# Patient Record
Sex: MALE | Race: WHITE | Employment: OTHER | URBAN - NONMETROPOLITAN AREA
[De-identification: names, ages, dates, MRNs, and addresses within clinical notes are randomized per-mention and may not be internally consistent; named-entity substitution may affect disease eponyms.]

---

## 2017-02-10 DIAGNOSIS — K21.9 GASTROESOPHAGEAL REFLUX DISEASE WITHOUT ESOPHAGITIS: Primary | ICD-10-CM

## 2017-02-13 RX ORDER — PANTOPRAZOLE SODIUM 40 MG/1
TABLET, DELAYED RELEASE ORAL
Qty: 180 TABLET | Refills: 1 | Status: SHIPPED | OUTPATIENT
Start: 2017-02-13 | End: 2018-10-23

## 2017-03-29 DIAGNOSIS — D64.9 ANEMIA: ICD-10-CM

## 2017-03-29 DIAGNOSIS — R52 PAIN: ICD-10-CM

## 2017-03-31 RX ORDER — TRAMADOL HYDROCHLORIDE 50 MG/1
TABLET ORAL
Qty: 30 TABLET | Refills: 0 | Status: SHIPPED | OUTPATIENT
Start: 2017-03-31 | End: 2018-07-30

## 2017-03-31 RX ORDER — NEEDLES, DISPOSABLE 25GX5/8"
NEEDLE, DISPOSABLE MISCELLANEOUS
Qty: 2 EACH | Refills: 1 | Status: SHIPPED | OUTPATIENT
Start: 2017-03-31

## 2017-04-21 ENCOUNTER — AMBULATORY - GICH (OUTPATIENT)
Dept: FAMILY MEDICINE | Facility: OTHER | Age: 67
End: 2017-04-21

## 2017-04-26 ENCOUNTER — HISTORY (OUTPATIENT)
Dept: FAMILY MEDICINE | Facility: OTHER | Age: 67
End: 2017-04-26

## 2017-04-26 ENCOUNTER — OFFICE VISIT - GICH (OUTPATIENT)
Dept: FAMILY MEDICINE | Facility: OTHER | Age: 67
End: 2017-04-26

## 2017-04-26 DIAGNOSIS — K50.919 CROHN'S DISEASE WITH COMPLICATION (H): ICD-10-CM

## 2017-04-26 DIAGNOSIS — M19.90 OSTEOARTHRITIS: ICD-10-CM

## 2017-04-26 DIAGNOSIS — D51.0 VITAMIN B12 DEFICIENCY ANEMIA DUE TO INTRINSIC FACTOR DEFICIENCY: ICD-10-CM

## 2017-04-26 DIAGNOSIS — E78.5 HYPERLIPIDEMIA: ICD-10-CM

## 2017-05-02 ENCOUNTER — COMMUNICATION - GICH (OUTPATIENT)
Dept: FAMILY MEDICINE | Facility: OTHER | Age: 67
End: 2017-05-02

## 2017-05-02 DIAGNOSIS — E78.5 HYPERLIPIDEMIA: ICD-10-CM

## 2017-05-23 ENCOUNTER — COMMUNICATION - GICH (OUTPATIENT)
Dept: FAMILY MEDICINE | Facility: OTHER | Age: 67
End: 2017-05-23

## 2017-05-23 DIAGNOSIS — K21.9 GASTRO-ESOPHAGEAL REFLUX DISEASE WITHOUT ESOPHAGITIS: ICD-10-CM

## 2017-08-03 ENCOUNTER — AMBULATORY - GICH (OUTPATIENT)
Dept: SCHEDULING | Facility: OTHER | Age: 67
End: 2017-08-03

## 2017-08-16 ENCOUNTER — COMMUNICATION - GICH (OUTPATIENT)
Dept: FAMILY MEDICINE | Facility: OTHER | Age: 67
End: 2017-08-16

## 2017-08-16 DIAGNOSIS — K21.00 GASTRO-ESOPHAGEAL REFLUX DISEASE WITH ESOPHAGITIS: ICD-10-CM

## 2017-08-16 DIAGNOSIS — K22.70 BARRETT'S ESOPHAGUS WITHOUT DYSPLASIA: ICD-10-CM

## 2017-08-16 DIAGNOSIS — E78.5 HYPERLIPIDEMIA: ICD-10-CM

## 2017-08-16 DIAGNOSIS — Z00.00 ENCOUNTER FOR GENERAL ADULT MEDICAL EXAMINATION WITHOUT ABNORMAL FINDINGS: ICD-10-CM

## 2017-08-17 ENCOUNTER — AMBULATORY - GICH (OUTPATIENT)
Dept: LAB | Facility: OTHER | Age: 67
End: 2017-08-17

## 2017-08-17 ENCOUNTER — AMBULATORY - GICH (OUTPATIENT)
Dept: SCHEDULING | Facility: OTHER | Age: 67
End: 2017-08-17

## 2017-08-17 DIAGNOSIS — E78.5 HYPERLIPIDEMIA: ICD-10-CM

## 2017-08-17 DIAGNOSIS — Z00.00 ENCOUNTER FOR GENERAL ADULT MEDICAL EXAMINATION WITHOUT ABNORMAL FINDINGS: ICD-10-CM

## 2017-08-17 LAB
A/G RATIO - HISTORICAL: 1.5 (ref 1–2)
ALBUMIN SERPL-MCNC: 3.8 G/DL (ref 3.5–5.7)
ALP SERPL-CCNC: 61 IU/L (ref 34–104)
ALT (SGPT) - HISTORICAL: 18 IU/L (ref 7–52)
ANION GAP - HISTORICAL: 12 (ref 5–18)
AST SERPL-CCNC: 21 IU/L (ref 13–39)
BILIRUB SERPL-MCNC: 0.6 MG/DL (ref 0.3–1)
BUN SERPL-MCNC: 12 MG/DL (ref 7–25)
BUN/CREAT RATIO - HISTORICAL: 13
CALCIUM SERPL-MCNC: 8.8 MG/DL (ref 8.6–10.3)
CHLORIDE SERPLBLD-SCNC: 107 MMOL/L (ref 98–107)
CHOL/HDL RATIO - HISTORICAL: 4.18
CHOLESTEROL TOTAL: 205 MG/DL
CO2 SERPL-SCNC: 23 MMOL/L (ref 21–31)
CREAT SERPL-MCNC: 0.94 MG/DL (ref 0.7–1.3)
GFR IF NOT AFRICAN AMERICAN - HISTORICAL: >60 ML/MIN/1.73M2
GLOBULIN - HISTORICAL: 2.5 G/DL (ref 2–3.7)
GLUCOSE SERPL-MCNC: 83 MG/DL (ref 70–105)
HDLC SERPL-MCNC: 49 MG/DL (ref 23–92)
LDLC SERPL CALC-MCNC: 139 MG/DL
NON-HDL CHOLESTEROL - HISTORICAL: 156 MG/DL
PATIENT STATUS - HISTORICAL: ABNORMAL
POTASSIUM SERPL-SCNC: 4.4 MMOL/L (ref 3.5–5.1)
PROT SERPL-MCNC: 6.3 G/DL (ref 6.4–8.9)
SODIUM SERPL-SCNC: 142 MMOL/L (ref 133–143)
TRIGL SERPL-MCNC: 85 MG/DL

## 2017-08-18 ENCOUNTER — AMBULATORY - GICH (OUTPATIENT)
Dept: SCHEDULING | Facility: OTHER | Age: 67
End: 2017-08-18

## 2017-08-21 ENCOUNTER — AMBULATORY - GICH (OUTPATIENT)
Dept: SCHEDULING | Facility: OTHER | Age: 67
End: 2017-08-21

## 2017-08-28 ENCOUNTER — AMBULATORY - GICH (OUTPATIENT)
Dept: SCHEDULING | Facility: OTHER | Age: 67
End: 2017-08-28

## 2017-08-29 ENCOUNTER — OFFICE VISIT - GICH (OUTPATIENT)
Dept: FAMILY MEDICINE | Facility: OTHER | Age: 67
End: 2017-08-29

## 2017-08-29 ENCOUNTER — AMBULATORY - GICH (OUTPATIENT)
Dept: SCHEDULING | Facility: OTHER | Age: 67
End: 2017-08-29

## 2017-08-29 ENCOUNTER — HISTORY (OUTPATIENT)
Dept: FAMILY MEDICINE | Facility: OTHER | Age: 67
End: 2017-08-29

## 2017-08-29 DIAGNOSIS — Z91.89 OTHER SPECIFIED PERSONAL RISK FACTORS, NOT ELSEWHERE CLASSIFIED: ICD-10-CM

## 2017-09-15 ENCOUNTER — AMBULATORY - GICH (OUTPATIENT)
Dept: SCHEDULING | Facility: OTHER | Age: 67
End: 2017-09-15

## 2017-09-28 ENCOUNTER — AMBULATORY - GICH (OUTPATIENT)
Dept: SCHEDULING | Facility: OTHER | Age: 67
End: 2017-09-28

## 2017-11-20 ENCOUNTER — HISTORY (OUTPATIENT)
Dept: FAMILY MEDICINE | Facility: OTHER | Age: 67
End: 2017-11-20

## 2017-11-22 ENCOUNTER — HISTORY (OUTPATIENT)
Dept: FAMILY MEDICINE | Facility: OTHER | Age: 67
End: 2017-11-22

## 2017-11-22 ENCOUNTER — OFFICE VISIT - GICH (OUTPATIENT)
Dept: FAMILY MEDICINE | Facility: OTHER | Age: 67
End: 2017-11-22

## 2017-11-22 DIAGNOSIS — D51.0 VITAMIN B12 DEFICIENCY ANEMIA DUE TO INTRINSIC FACTOR DEFICIENCY: ICD-10-CM

## 2017-11-22 DIAGNOSIS — Z23 ENCOUNTER FOR IMMUNIZATION: ICD-10-CM

## 2017-11-22 DIAGNOSIS — E78.5 HYPERLIPIDEMIA: ICD-10-CM

## 2017-11-22 DIAGNOSIS — K50.919 CROHN'S DISEASE WITH COMPLICATION (H): ICD-10-CM

## 2017-11-22 DIAGNOSIS — Z01.810 ENCOUNTER FOR PREPROCEDURAL CARDIOVASCULAR EXAMINATION: ICD-10-CM

## 2017-11-22 LAB
ABSOLUTE BASOPHILS - HISTORICAL: 0 THOU/CU MM
ABSOLUTE EOSINOPHILS - HISTORICAL: 0.1 THOU/CU MM
ABSOLUTE IMMATURE GRANULOCYTES(METAS,MYELOS,PROS) - HISTORICAL: 0 THOU/CU MM
ABSOLUTE LYMPHOCYTES - HISTORICAL: 0.5 THOU/CU MM (ref 0.9–2.9)
ABSOLUTE MONOCYTES - HISTORICAL: 0.5 THOU/CU MM
ABSOLUTE NEUTROPHILS - HISTORICAL: 3.6 THOU/CU MM (ref 1.7–7)
BASOPHILS # BLD AUTO: 0.8 %
CHOL/HDL RATIO - HISTORICAL: 3.02
CHOLESTEROL TOTAL: 157 MG/DL
EOSINOPHIL NFR BLD AUTO: 2.5 %
ERYTHROCYTE [DISTWIDTH] IN BLOOD BY AUTOMATED COUNT: 16.7 % (ref 11.5–15.5)
HCT VFR BLD AUTO: 37.2 % (ref 37–53)
HDLC SERPL-MCNC: 52 MG/DL (ref 23–92)
HEMOGLOBIN: 11.5 G/DL (ref 13.5–17.5)
IMMATURE GRANULOCYTES(METAS,MYELOS,PROS) - HISTORICAL: 0.4 %
LDLC SERPL CALC-MCNC: 91 MG/DL
LYMPHOCYTES NFR BLD AUTO: 9.7 % (ref 20–44)
MCH RBC QN AUTO: 26.1 PG (ref 26–34)
MCHC RBC AUTO-ENTMCNC: 30.9 G/DL (ref 32–36)
MCV RBC AUTO: 85 FL (ref 80–100)
MONOCYTES NFR BLD AUTO: 11.4 %
NEUTROPHILS NFR BLD AUTO: 75.2 % (ref 42–72)
NON-HDL CHOLESTEROL - HISTORICAL: 105 MG/DL
PLATELET # BLD AUTO: 225 THOU/CU MM (ref 140–440)
PMV BLD: 12.2 FL (ref 6.5–11)
PROVIDER ORDERDED STATUS - HISTORICAL: NORMAL
RED BLOOD COUNT - HISTORICAL: 4.4 MIL/CU MM (ref 4.3–5.9)
TRIGL SERPL-MCNC: 69 MG/DL
WHITE BLOOD COUNT - HISTORICAL: 4.7 THOU/CU MM (ref 4.5–11)

## 2017-12-27 NOTE — PROGRESS NOTES
Patient Information     Patient Name MRN Sex Phi Waters [id number] Male 1950      Progress Notes by Gaston Bucio MD at 2017 12:00 PM     Author:  Gaston Bucio MD Service:  (none) Author Type:  Physician     Filed:  2017  7:00 AM Encounter Date:  2017 Status:  Signed     :  Gaston Bucio MD (Physician)            ----------------- PREOPERATIVE EXAM ------------------  2017    SUBJECTIVE:  Phi Tabares is a 67 y.o. male here for preoperative optimization.    I was asked to see Phi Tabares by Dr. Mcdowell for a preoperative optimization due to Crohns disease and GERD.    HPI:  Patient reports that he has felt well recently. He do hunted this season and reports she walked quarter-mile into his deer stand and was able to drag a deer out by hand without any chest pain. He did have a low shortness of breath with exertion but nothing that is out of the ordinary given his age and the described level of exertion. He has not had any fevers or chills recently. His bowel symptoms have been stable without any recent flares. He does continue to take chronic prednisone, currently 5 mg and is considering further reduction. Also on azathioprine and Mesalamine. This is prescribed through his GI specialist.    I recently started patient on Lipitor and he is tolerated very well. Also takes aspirin 81 mg EC.      Nursing Notes:   Lise Powell  2017 12:21 PM  Signed  Patient presents today for Preop.    Date of Surgery: 17  Type of Surgery: Cataract   Surgeon: Dr. Ward  Hospital:  [facility]  Fax:     Fever/Chills or other infectious symptoms in past month: no  >10lb weight loss in past two months: no  O2 SAT: 98    Health Care Directive/Code status:  no  Hx of blood transfusions:   (NO)   Td up to date:    History of VRE/MRSA:  (NO) Date:    Preoperative Evaluation: Obstructive Sleep Apnea screening    S: Snore -  Do you snore loudly?  "(louder than talking or loud enough to be heard through closed doors)(NO)  T: Tired - Do you often feel tired, fatigued, or sleepy during the daytime?(NO)  O: Observed - Has anyone ever observed you stop breathing during your sleep?(NO)  P: Pressure - Do you have or are you being treated for high blood pressure?(YES)  B: BMI - BMI greater than 35kg/m2?(YES)  A: Age - Age over 50 years old?(YES)  N: Neck - Neck circumference greater than 40 cm?(YES)  G: Gender - Gender: Male?(YES)    Total number of \"YES\" responses:  5    Scoring: Low risk of JO-ANN 0-2  At Risk of JO-ANN: >3 High Risk of JO-ANN: 5-8    Lise Powell LPN...................  11/22/2017   12:07 PM            Patient Active Problem List       Diagnosis  Date Noted     GERD with esophagitis  08/18/2017     Greer's esophagus determined by endoscopy       Per Dr Scott (GI at [facility])        Crohn's disease with complication (HC)  04/26/2017     Pernicious anemia  04/26/2017     Osteoarthritis  04/26/2017     Hyperlipidemia  04/26/2017       No past medical history on file.    Past Surgical History:      Procedure  Laterality Date     COLONOSCOPY DIAGNOSTIC  09/28/2017    [facility]         Current Outpatient Prescriptions       Medication  Sig Dispense Refill     aspirin (ECOTRIN) 81 mg enteric coated tablet Take 1 tablet by mouth once daily with a meal. 90 tablet 3     atorvastatin (LIPITOR) 10 mg tablet Take 1 tablet by mouth once daily. 30 tablet 2     azaTHIOprine (IMURAN) 50 mg tablet Take 150 tablets by mouth every morning.       cyanocobalamin (VITAMIN B12) 1,000 mcg/mL injection Inject 1 mL intramuscular every 2 weeks. 6 mL 3     mesalamine (LIALDA) 1.2 gram Delayed-Release tablet 3 tablets daily.  0     pantoprazole (PROTONIX) 40 mg delayed-release tablet Take 1 tablet by mouth 2 times daily if needed. 60 tablet 5     predniSONE (DELTASONE) 5 mg tablet Take 1 tablet by mouth once daily.  0     traMADol (ULTRAM) 50 mg tablet Take 1 tablet by mouth " "every 6 hours if needed. 180 tablet 1     CHEN DISP NEEDLES [id number]/2 22 gauge x 1 1/2\" ndle USE FOR VITAMIN B-12 INJECTIONS EVERY 2 WEEKS 12 Each 2     No current facility-administered medications for this visit.      Medications have been reviewed by me and are current to the best of my knowledge and ability.    Recent use of: aspirin    Allergies:  Allergies     Allergen  Reactions     Iodine Rash   Latex allergy  no    No family history on file.    Denies family hx of bleeding tendencies, anesthesia complications, or other problems with surgery.    Social History        Substance Use Topics          Smoking status:   Former Smoker      Quit date:  1/1/2013      Smokeless tobacco:   Never Used      Alcohol use   0.6 oz/week     1 Cans of beer per week        Comment: occasionally uses alcohol socially         ROS:    Surgical:  patient denies previous complications from prior surgeries including but not limited to prolonged bleeding, anesthesia complications, dysrhythmias, surgical wound infections, or prolonged hospital stay.       -------------------------------------------------------------    PHYSICAL EXAM:  /90  Pulse 64  Temp 97.6  F (36.4  C) (Oral)  Ht 1.67 m (5' 5.75\")  Wt 94.6 kg (208 lb 9.6 oz)  SpO2 98%  BMI 33.93 kg/m2    EXAM:  General Appearance: Pleasant, alert, appropriate appearance for age. No acute distress  Head Exam: Normal. Normocephalic, atraumatic.  Eyes: PERRL, EOMI  Ears: Normal TM's bilaterally. Normal auditory canals and external ears.   OroPharynx: Normal buccal mucosa. Normal pharynx. Patient has both top and bottom dentures.  Neck: Supple, no masses or nodes, no lymphadenopathy.  No thyromegaly.  Lungs: Normal chest wall and respirations. Clear to auscultation, no wheezes or crackles.  Cardiovascular: Regular rate and rhythm. S1, S2, no murmurs.  Gastrointestinal: Soft, nontender, no abnormal masses or organomegaly. BS normal   Musculoskeletal: No edema.  Skin: no " concerning or new rashes.  Neurologic Exam: CN 2-12 grossly intact.  Normal gait.  Symmetric DTRs, normal gross motor movement, tone, and coordination. No tremor.  Psychiatric Exam: Alert and oriented, appropriate affect.      ---------------------------------------------------------------  Results for orders placed or performed in visit on 11/22/17      LIPID PANEL      Result  Value Ref Range    CHOLESTEROL,TOTAL 157 <200 mg/dL    TRIGLYCERIDES 69 <150 mg/dL    HDL CHOLESTEROL 52 23 - 92 mg/dL    NON-HDL CHOLESTEROL 105 <145 mg/dl    CHOL/HDL RATIO            3.02 <4.50                    LDL CHOLESTEROL 91 <100 mg/dL    PROVIDER ORDERED STATUS RANDOM    CBC WITH AUTO DIFFERENTIAL      Result  Value Ref Range    WHITE BLOOD COUNT         4.7 4.5 - 11.0 thou/cu mm    RED BLOOD COUNT           4.40 4.30 - 5.90 mil/cu mm    HEMOGLOBIN                11.5 (L) 13.5 - 17.5 g/dL    HEMATOCRIT                37.2 37.0 - 53.0 %    MCV                       85 80 - 100 fL    MCH                       26.1 26.0 - 34.0 pg    MCHC                      30.9 (L) 32.0 - 36.0 g/dL    RDW                       16.7 (H) 11.5 - 15.5 %    PLATELET COUNT            225 140 - 440 thou/cu mm    MPV                       12.2 (H) 6.5 - 11.0 fL    NEUTROPHILS               75.2 (H) 42.0 - 72.0 %    LYMPHOCYTES               9.7 (L) 20.0 - 44.0 %    MONOCYTES                 11.4 <12.0 %    EOSINOPHILS               2.5 <8.0 %    BASOPHILS                 0.8 <3.0 %    IMMATURE GRANULOCYTES(METAS,MYELOS,PROS) 0.4 %    ABSOLUTE NEUTROPHILS      3.6 1.7 - 7.0 thou/cu mm    ABSOLUTE LYMPHOCYTES      0.5 (L) 0.9 - 2.9 thou/cu mm    ABSOLUTE MONOCYTES        0.5 <0.9 thou/cu mm    ABSOLUTE EOSINOPHILS      0.1 <0.5 thou/cu mm    ABSOLUTE BASOPHILS        0.0 <0.3 thou/cu mm    ABSOLUTE IMMATURE GRANULOCYTES(METAS,MYELOS,PROS) 0.0 <=0.3 thou/cu mm       ASSESSEMENT AND PLAN:    Phi was seen today for pre-op exam.    Diagnoses and all orders for  this visit:    Influenza vaccination given  -     FLU VACCINE => 65 YRS HIGH DOSE TRIVALENT IIV3 IM  -     MO ADMIN FLU VACC SEASONAL (MEDICARE)    Crohn's disease with complication, unspecified gastrointestinal tract location (HC)  continue current medications. Managed through GI specialist.    Preop cardiovascular exam    Pernicious anemia  patient reports a history of pernicious anemia. Hgb was 12.6 on 8/1/2016.  Hemoglobin remains a little bit low but MCV is normal. He is not aware of any blood loss such as black or bloody stool but does have a history of Crohn's which could impact his absorption or lead to some microscopic blood loss. Suggest recheck in a month or two, if stable continue to monitor. If dropping suggest guaiac and iron/B12/folate studies to see if he has combined anemia.  -     CBC WITH DIFFERENTIAL; Future  -     CBC WITH DIFFERENTIAL  -     CBC WITH AUTO DIFFERENTIAL    Hyperlipidemia, unspecified hyperlipidemia type  significant improvement in hyperlipidemia. Continue on Lipitor 10 mg.  -     LIPID PANEL; Future  -     LIPID PANEL      PRE OP RECOMMENDATIONS:    No family history of problems with bleeding or anesthesia. Patient is able to tolerate greater than 4 METs of activity without any cardiopulmonary symptoms. ASA PS class 3 . No cardiopulmonary workup is neccessary for the current procedure. Please contact the office with any questions or concerns.    Patient is on chronic pain medications (NO);   Patient is on antiplatlet/anticoagulation (YES) - ASA  Other medications that need adjustment perioperatively (NO)    Other:  Patient was advised to call our office and the surgical services with any change in condition or new symptoms if they were to develop between today and their surgical date.  Especially any cardiopulmonary symptoms or symptoms concerning for an infection.    Gaston Bucio MD    This document was prepared using voice generated software.  While every attempt was made  for accuracy, grammatical errors may exist.

## 2017-12-28 NOTE — TELEPHONE ENCOUNTER
Patient Information     Patient Name MRN Sex Phi Waters [id number] Male 1950      Telephone Encounter by Gaston Bucio MD at 2017  3:47 PM     Author:  Gaston Bucio MD Service:  (none) Author Type:  Physician     Filed:  2017  3:47 PM Encounter Date:  2017 Status:  Signed     :  Gaston Bucio MD (Physician)            Labs entered.

## 2017-12-28 NOTE — TELEPHONE ENCOUNTER
Patient Information     Patient Name MRN Sex Phi Waters [id number] Male 1950      Telephone Encounter by Mattie Rae at 2017  9:53 AM     Author:  Mattie Rae  Service:  (none) Author Type:  (none)     Filed:  2017 10:10 AM  Encounter Date:  2017 Status:  Addendum     :  Mattie Rae        Related Notes: Original Note by Mattie Rae filed at 2017 10:07 AM            PA sent in was returned because Phi has two different birth dates on his medical and identification history and Insurance needed clarification. When I called, I was told that [facility] doesn't process his pharmacy benefit plan, but [facility] Medicare Part D is the pharmacy benefit for him. However, she did tell me that the [id number] number is actually a discount card number so I called [facility] to give them the information on that card and they put that through for the Pantoprazole 40 mg BID and it came out to $10.   I will call the patient and let him know that. Per Dr Bucio, his cholesterol is high and recommended a follow up appointment. He does have an appointment for a lab follow up on , so will have him be sure to keep that to discuss possible increase in his Crestor dose.    Left message to call back.  Mattie Rae CMA(AAMA)  ..................2017   10:07 AM

## 2017-12-28 NOTE — TELEPHONE ENCOUNTER
Patient Information     Patient Name MRN Phi Tate [id number] Male 1950      Telephone Encounter by Mattie Rae at 2017  3:20 PM     Author:  Mattie Rae Service:  (none) Author Type:  (none)     Filed:  2017  3:38 PM Encounter Date:  2017 Status:  Signed     :  Mattie Rae            Fax received from [facility] looking for a PA on Pantoprazole 40 mg, because it is a covered medication at one tablet daily, but he takes it twice daily. It has been covered for the last few months this way, but now it isn't. Pharmacist says it may be a formulary change or a prior PA was done and has  from his previous provider. He just established care with our facility in April this year.   When I spoke to him, he said he has always taken 40 mg twice daily. He doesn't recall ever taking less, nor taking any other type of PPI. He will come in and sign a Release of Info form so I can get his records from his previous provider so that I can try to get info to fill out a PA. Otherwise, he is aware he may have to take it once a day for a while.  Also, he says he was supposed to have his cholesterol checked and there is no order. He would like to do that when he stops in to sign the CORRINE.  Mattie Rae [facility](AAMA) ....................  2017   3:22 PM

## 2017-12-28 NOTE — TELEPHONE ENCOUNTER
Patient Information     Patient Name MRN Phi Tate [id number] Male 1950      Telephone Encounter by Mattie Rae at 2017  9:23 AM     Author:  Mattie Rae Service:  (none) Author Type:  (none)     Filed:  2017  9:24 AM Encounter Date:  2017 Status:  Signed     :  Mattie Rae            The phone number that [facility] gave me was his cell phone and he called back. I updated Demographics.  Patient notified of all info prior to this note.  Mattie Rae [facility](AAMA)  ..................2017   9:24 AM

## 2017-12-28 NOTE — PROGRESS NOTES
Patient Information     Patient Name MRN Sex Phi Waters [id number] Male 1950      Progress Notes by Gaston Bucio MD at 2017  3:30 PM     Author:  Gaston Bucio MD Service:  (none) Author Type:  Physician     Filed:  2017  2:16 PM Encounter Date:  2017 Status:  Signed     :  Gaston Bucio MD (Physician)            Nursing Notes:   Javier Francoise  2017  4:02 PM  Addendum  Patient presents to the [facility] today to go over the results of his recent labs.  He has a colonoscopy scheduled at [facility] on 17.      SUBJECTIVE:  Phi Tabares is a 66 y.o. Male.  He comes in today to discuss his cardiovascular risk. Patient recently quit taking Crestor. He is here to follow-up on his lipid results. He reports on the Crestor his feet ached. He felt like this started about 1 week after he started the medication. Eventually all of his joints started hurting. One month ago discontinue the Crestor. He is currently using tramadol, 800 mg ibuprofen and 1000 mg Tylenol. He does feel that his myalgias and arthralgias have improved a little bit since stopping the Crestor. He has not had any chest pain or shortness of breath.    He thinks he was on simvastatin in the past and that caused significant joint aches as well but does not recall being on Lipitor      OBJECTIVE:  /80  Pulse 68  Wt 95.7 kg (211 lb)  BMI 33.05 kg/m2  EXAM:  General Appearance: Pleasant, alert, appropriate appearance for age. No acute distress  Chest/Respiratory Exam: Normal chest wall and respirations. Clear to auscultation.  Cardiovascular Exam: Regular rate and rhythm. S1, S2, no murmur, click, gallop, or rubs.    Results for orders placed or performed in visit on 17      LIPID PANEL      Result  Value Ref Range    CHOLESTEROL,TOTAL 205 (H) <200 mg/dL    TRIGLYCERIDES 85 <150 mg/dL    HDL CHOLESTEROL 49 23 - 92 mg/dL    NON-HDL CHOLESTEROL 156 (H) <145 mg/dl    CHOL/HDL RATIO             4.18 <4.50                    LDL CHOLESTEROL 139 (H) <100 mg/dL    PATIENT STATUS            FASTING                   COMPLETE METABOLIC PANEL      Result  Value Ref Range    SODIUM 142 133 - 143 mmol/L    POTASSIUM 4.4 3.5 - 5.1 mmol/L    CHLORIDE 107 98 - 107 mmol/L    CO2,TOTAL 23 21 - 31 mmol/L    ANION GAP 12 5 - 18                    GLUCOSE 83 70 - 105 mg/dL    CALCIUM 8.8 8.6 - 10.3 mg/dL    BUN 12 7 - 25 mg/dL    CREATININE 0.94 0.70 - 1.30 mg/dL    BUN/CREAT RATIO           13                    GFR if African American >60 >60 ml/min/1.73m2    GFR if not African American >60 >60 ml/min/1.73m2    ALBUMIN 3.8 3.5 - 5.7 g/dL    PROTEIN,TOTAL 6.3 (L) 6.4 - 8.9 g/dL    GLOBULIN                  2.5 2.0 - 3.7 g/dL    A/G RATIO 1.5 1.0 - 2.0                    BILIRUBIN,TOTAL 0.6 0.3 - 1.0 mg/dL    ALK PHOSPHATASE 61 34 - 104 IU/L    ALT (SGPT) 18 7 - 52 IU/L    AST (SGOT) 21 13 - 39 IU/L       ASSESSMENT/Plan :      Phi was seen today for follow up.    Diagnoses and all orders for this visit:    [place] cardiac risk 10-20% in next 10 years  recommend patient start taking aspirin enteric-coated 81 mg daily. His [place] risk score is quite high. It is excellent that he is no longer smoking. I would like to see him on a statin medication however. After discussion of all the risks, benefits and potential statin medications I would suggest trial of Lipitor. If symptoms return he will discontinue and call and then we will try pravastatin. If pravastatin also causes the symptoms he will likely need to rely on healthy eating habits and maintenance of healthy weight which would be estimated at about 180 pounds as the primary means to control his high cholesterol and minimize his cardiovascular risk.  -     atorvastatin (LIPITOR) 10 mg tablet; Take 1 tablet by mouth once daily.  -     aspirin (ECOTRIN) 81 mg enteric coated tablet; Take 1 tablet by mouth once daily with a meal.        There are no Patient  Instructions on file for this visit.    Gaston Bucio MD    This document was created using computer generated templates and voice activated software.

## 2017-12-30 NOTE — NURSING NOTE
"Patient Information     Patient Name MRN Phi Tate [id number] Male 1950      Nursing Note by Lise Powell at 2017 12:00 PM     Author:  Lise Powell Service:  (none) Author Type:  (none)     Filed:  2017 12:21 PM Encounter Date:  2017 Status:  Signed     :  Lise Powell            Patient presents today for Preop.    Date of Surgery: 17  Type of Surgery: Cataract   Surgeon: Dr. Ward  Hospital:  [facility]  Fax:     Fever/Chills or other infectious symptoms in past month: no  >10lb weight loss in past two months: no  O2 SAT: 98    Health Care Directive/Code status:  no  Hx of blood transfusions:   (NO)   Td up to date:    History of VRE/MRSA:  (NO) Date:    Preoperative Evaluation: Obstructive Sleep Apnea screening    S: Snore -  Do you snore loudly? (louder than talking or loud enough to be heard through closed doors)(NO)  T: Tired - Do you often feel tired, fatigued, or sleepy during the daytime?(NO)  O: Observed - Has anyone ever observed you stop breathing during your sleep?(NO)  P: Pressure - Do you have or are you being treated for high blood pressure?(YES)  B: BMI - BMI greater than 35kg/m2?(YES)  A: Age - Age over 50 years old?(YES)  N: Neck - Neck circumference greater than 40 cm?(YES)  G: Gender - Gender: Male?(YES)    Total number of \"YES\" responses:  5    Scoring: Low risk of JO-ANN 0-2  At Risk of JO-ANN: >3 High Risk of JO-ANN: 5-8    Lise Powell LPN...................  2017   12:07 PM            "

## 2017-12-30 NOTE — NURSING NOTE
Patient Information     Patient Name MRN Phi Tate [id number] Male 1950      Nursing Note by Francoise Herrera at 2017  3:30 PM     Author:  Francoise Herrera  Service:  (none) Author Type:  (none)     Filed:  2017  4:02 PM  Encounter Date:  2017 Status:  Addendum     :  Francoise Herrera        Related Notes: Original Note by Francoise Herrera filed at 2017  4:01 PM            Patient presents to the [facility] today to go over the results of his recent labs.  He has a colonoscopy scheduled at [facility] on 17.

## 2018-01-02 ENCOUNTER — COMMUNICATION - GICH (OUTPATIENT)
Dept: FAMILY MEDICINE | Facility: OTHER | Age: 68
End: 2018-01-02

## 2018-01-02 DIAGNOSIS — Z91.89 OTHER SPECIFIED PERSONAL RISK FACTORS, NOT ELSEWHERE CLASSIFIED: ICD-10-CM

## 2018-01-02 DIAGNOSIS — K21.9 GASTRO-ESOPHAGEAL REFLUX DISEASE WITHOUT ESOPHAGITIS: ICD-10-CM

## 2018-01-04 NOTE — TELEPHONE ENCOUNTER
Patient Information     Patient Name MRN Sex Phi Waters [id number] Male 1950      Telephone Encounter by Mark Jama RN at 2017  4:08 PM     Author:  Mark Jama RN Service:  (none) Author Type:  NURS- Registered Nurse     Filed:  2017  4:15 PM Encounter Date:  2017 Status:  Signed     :  Mark Jama RN (NURS- Registered Nurse)            This is a Refill request from: MD [facility]os  Name of Medication: Appears to be looking for crestor  Quantity requested: 30 tabs with 2 refills  Last fill date: Unknown, as rx appears to be new  Due for refill: Yes,  Last visit with GASTON MICHEL was on: 2017 in [facility]  PCP:  Gaston Michel MD  Controlled Substance Agreement:  N/A   Diagnosis r/t this medication request: Hyperlipidemia    Faxed rx request received from [facility] pharmacy. Rx request states:    Patient is looking for new rx for cholesterol.     Current med list in chart shows no rx for hyperlipidemia or no rx for cholesterol. However, chart review shows that on office visit with PCP on 17, plan as follows:    We will try crestor 10 mg as cholesterol was not terribly elevated. We will recheck labs in 2 months.     Writer teed up rx as listed above. Will send to PCP for his consideration/approval.     Unable to complete prescription refill per RN Medication Refill Policy.................... Mark Jama RN ....................  2017   4:10 PM

## 2018-01-04 NOTE — PROGRESS NOTES
"Patient Information     Patient Name MRN Sex Phi Waters [id number] Male 1950      Progress Notes by Blayne Silveira, Med Student at 2017 12:30 PM     Author:  Blayne Silveira, Med Student  Service:  (none) Author Type:  PHYS- Medical Student     Filed:  2017  2:21 PM  Encounter Date:  2017 Status:  Addendum     :  Gaston Bucio MD (Physician)        Related Notes: Original Note by Gaston Bucio MD (Physician) filed at 2017  7:58 AM            SUBJECTIVE:    Phi Tabares is a 66 y.o. male who presents to the clinic to establish care.     HPI    Mr. Tabares moved to the area from [place], where he had previously received care and would like to switch his care to [facility] because he no longer wants to drive to [place]. His medical history is significant for Crohn's disease, osteoarthritis and hyperlipidemia.     He was diagnosed with Crohn's disease at age 20 and has had \"90%\" of his large intestine removed in . He is followed by Gastroenterology out [facility] in [place]. They have controlled his crohn's and it has been in remission for about the last eight years. Because of his crohn's and subsequent surgeries, he receives B12 injections every two weeks or he starts having symptoms of difficulty thinking, decreased strength and fatigue. He has been on prednisone for a long time and has been slowly tapering down. He recently went from 7.5 mg to 5 mg. In the past when he stops it, he develops a bowel obstruction. Otherwise it is controlled with imuran and lialda.     He has had osteoarthritis in his hands for years. The prednisone helps reduce the inflammation but his pain is best controlled with tramadol. He has been taking one pill a day for years and was effective until recently. He feels it would be controlled by 2 pills a day, as he has used that in the past occasionally and it helped him.       Allergies     Allergen  Reactions     Iodine Rash   , " "  Current Outpatient Prescriptions on File Prior to Visit       Medication  Sig Dispense Refill     azaTHIOprine (IMURAN) 50 mg tablet Take 150 tablets by mouth every morning.       pantoprazole (PROTONIX) 40 mg delayed-release tablet Take 1 tablet by mouth 2 times daily if needed.       No current facility-administered medications on file prior to visit.     and   Patient Active Problem List      Diagnosis Date Noted     Crohn's disease with complication (HC) 04/26/2017     Pernicious anemia 04/26/2017     Osteoarthritis 04/26/2017     Hyperlipidemia 04/26/2017       REVIEW OF SYSTEMS:  Review of Systems   Constitutional: Negative for fever and weight loss.   Eyes: Negative for blurred vision.   Respiratory: Negative for cough and shortness of breath.    Cardiovascular: Negative for chest pain.   Gastrointestinal: Negative for abdominal pain.   Genitourinary: Negative for dysuria.   Musculoskeletal: Positive for joint pain.   Skin: Negative for rash.   Neurological: Positive for tingling. Negative for dizziness and headaches.       OBJECTIVE:  /86  Pulse 64  Ht 1.702 m (5' 7\")  Wt 96.2 kg (212 lb)  BMI 33.2 kg/m2    EXAM:   Physical Exam   Constitutional: He is well-developed, well-nourished, and in no distress.   HENT:   Head: Normocephalic and atraumatic.   Eyes: Conjunctivae are normal.   Neck: Normal range of motion.   Cardiovascular: Normal rate and regular rhythm.    Pulmonary/Chest: Effort normal and breath sounds normal.   Abdominal: Soft. Bowel sounds are normal.   Musculoskeletal: Normal range of motion. He exhibits no edema.   Neurological: He is alert.   Skin: Skin is warm and dry.       ASSESSMENT/PLAN:    ICD-10-CM    1. Osteoarthritis, unspecified osteoarthritis type, unspecified site M19.90 traMADol (ULTRAM) 50 mg tablet   2. Pernicious anemia D51.0 CHEN DISP NEEDLES 09QJ8-6/2 22 gauge x 1 1/2\" ndle      cyanocobalamin (VITAMIN B12) 1,000 mcg/mL injection      DISCONTINUED: cyanocobalamin " (VITAMIN B12) 1,000 mcg/mL injection   3. Crohn's disease with complication, unspecified gastrointestinal tract location () K50.910    4. Hyperlipidemia, unspecified hyperlipidemia type E78.5         Plan:      1. Crohn's disease - well controlled and followed by [facility]. Ok to continue with B12 injections and prednisone. Discussed continuing taper of prednisone very slowly.  He went down from 7.5mg to 5mg not too long ago, would suggest 1mg per month from here on,  and we will pursue this when patient is ready.     2. Hyperlipidemia - Labs were checked in [place] last July. Cholesterol was elevated, patient was on simvastatin 40 mg but it made his muscles hurt so he stopped taking it. We will try crestor 10 mg as cholesterol was not terribly elevated. We will recheck labs in 2 months.     3. Osteoarthritis - Stable and ok to use 2 tramadol a day, we will refill those.       Greater than 50% of this 45 minute visit was spent in counseling and coordination of care regarding diagnosis and treatment of the above including outside record review.    Plan discussed with Dr. Gaston Silveira, Med Student ....................  4/26/2017   2:18 PM    Entire note was reviewed and edited line by line by me prior to signing.  Gaston Bucio MD

## 2018-01-05 ENCOUNTER — TELEPHONE (OUTPATIENT)
Dept: INTERNAL MEDICINE | Facility: OTHER | Age: 68
End: 2018-01-05

## 2018-01-05 NOTE — TELEPHONE ENCOUNTER
Gordon Song:  Can you please advise of the directions for the pantoprazole?  It says take as directed, but I need to put in a specific amount when I submit the medication PA.  Thank you, Kacie Palafox, HIS Specialist.

## 2018-01-08 NOTE — TELEPHONE ENCOUNTER
1/8/2018 - submitted PA request thru [facility] for pantoprazole 40 mg QD.  Waiting for response.  Kacie Palafox, HIS Specialist.

## 2018-01-09 NOTE — TELEPHONE ENCOUNTER
APPROVAL - 1/8/2018 - Received APPROVAL from [employer] for Pantoprazole.  Approval dates:  10/10/2017 thru 01/08/2019.  Pharmacy advised.  Forms scanned to Epic.  Kacie Palafox, HIS Specialist.

## 2018-01-24 ENCOUNTER — HISTORY (OUTPATIENT)
Dept: FAMILY MEDICINE | Facility: OTHER | Age: 68
End: 2018-01-24

## 2018-01-24 ENCOUNTER — OFFICE VISIT - GICH (OUTPATIENT)
Dept: FAMILY MEDICINE | Facility: OTHER | Age: 68
End: 2018-01-24

## 2018-01-24 DIAGNOSIS — D50.9 IRON DEFICIENCY ANEMIA: ICD-10-CM

## 2018-01-24 DIAGNOSIS — D51.0 VITAMIN B12 DEFICIENCY ANEMIA DUE TO INTRINSIC FACTOR DEFICIENCY: ICD-10-CM

## 2018-01-24 DIAGNOSIS — Z23 ENCOUNTER FOR IMMUNIZATION: ICD-10-CM

## 2018-01-24 LAB
ABSOLUTE BASOPHILS - HISTORICAL: 0 THOU/CU MM
ABSOLUTE EOSINOPHILS - HISTORICAL: 0.1 THOU/CU MM
ABSOLUTE IMMATURE GRANULOCYTES(METAS,MYELOS,PROS) - HISTORICAL: 0 THOU/CU MM
ABSOLUTE LYMPHOCYTES - HISTORICAL: 0.4 THOU/CU MM (ref 0.9–2.9)
ABSOLUTE MONOCYTES - HISTORICAL: 0.5 THOU/CU MM
ABSOLUTE NEUTROPHILS - HISTORICAL: 4.5 THOU/CU MM (ref 1.7–7)
BASOPHILS # BLD AUTO: 0.5 %
EOSINOPHIL NFR BLD AUTO: 1.8 %
ERYTHROCYTE [DISTWIDTH] IN BLOOD BY AUTOMATED COUNT: 17.1 % (ref 11.5–15.5)
FERRITIN SERPL-MCNC: 10.9 NG/ML (ref 23.9–336.2)
FOLATE: 14.3 NG/ML
HCT VFR BLD AUTO: 37.5 % (ref 37–53)
HEMOGLOBIN: 11.4 G/DL (ref 13.5–17.5)
IMMATURE GRANULOCYTES(METAS,MYELOS,PROS) - HISTORICAL: 0.5 %
IRON BINDING CAP: 530.74 UG/DL (ref 245–400)
IRON SATURATION: 5 % (ref 20–55)
IRON: 25 UG/DL (ref 50–212)
LYMPHOCYTES NFR BLD AUTO: 7.9 % (ref 20–44)
MCH RBC QN AUTO: 24.9 PG (ref 26–34)
MCHC RBC AUTO-ENTMCNC: 30.4 G/DL (ref 32–36)
MCV RBC AUTO: 82 FL (ref 80–100)
MONOCYTES NFR BLD AUTO: 9.2 %
NEUTROPHILS NFR BLD AUTO: 80.1 % (ref 42–72)
PLATELET # BLD AUTO: 208 THOU/CU MM (ref 140–440)
PMV BLD: 12 FL (ref 6.5–11)
RED BLOOD COUNT - HISTORICAL: 4.57 MIL/CU MM (ref 4.3–5.9)
UIBC (UNSATURATED) - HISTORICAL: 505.74 MG/DL
VIT B12 SERPL-MCNC: 1317 PG/ML (ref 180–914)
WHITE BLOOD COUNT - HISTORICAL: 5.6 THOU/CU MM (ref 4.5–11)

## 2018-01-27 VITALS
DIASTOLIC BLOOD PRESSURE: 90 MMHG | HEIGHT: 66 IN | BODY MASS INDEX: 33.52 KG/M2 | HEART RATE: 64 BPM | WEIGHT: 208.6 LBS | OXYGEN SATURATION: 98 % | SYSTOLIC BLOOD PRESSURE: 136 MMHG | TEMPERATURE: 97.6 F

## 2018-01-27 VITALS
SYSTOLIC BLOOD PRESSURE: 120 MMHG | BODY MASS INDEX: 33.05 KG/M2 | WEIGHT: 211 LBS | DIASTOLIC BLOOD PRESSURE: 80 MMHG | HEART RATE: 68 BPM

## 2018-01-27 VITALS
HEIGHT: 67 IN | HEART RATE: 64 BPM | WEIGHT: 212 LBS | BODY MASS INDEX: 33.27 KG/M2 | SYSTOLIC BLOOD PRESSURE: 138 MMHG | DIASTOLIC BLOOD PRESSURE: 86 MMHG

## 2018-02-09 VITALS
BODY MASS INDEX: 34.64 KG/M2 | WEIGHT: 213 LBS | HEART RATE: 60 BPM | DIASTOLIC BLOOD PRESSURE: 80 MMHG | SYSTOLIC BLOOD PRESSURE: 120 MMHG

## 2018-02-12 NOTE — TELEPHONE ENCOUNTER
Patient Information     Patient Name MRN Sex Phi Waters [id number] Male 1950      Telephone Encounter by Mark Jama RN at 2018  2:45 PM     Author:  Mark Jama RN Service:  (none) Author Type:  NURS- Registered Nurse     Filed:  2018  2:51 PM Encounter Date:  2018 Status:  Signed     :  Mark Jama RN (NURS- Registered Nurse)            Proton Pump Inhibitors    Office visit in the past 12 months or per provider note.    Last visit with FRANK MICHEL was on: 2017 in [facility]  Next visit with FRANK MICHEL is on: 2018 in [facility]  Next visit with Family Practice is on: 2018 in [facility]    Max refill for 12 months from last office visit or per provider note.    Statins    Office visit in the past 12 months.    Last visit with FRANK MICHEL was on: 2017 in [facility]  Next visit with FRANK MICHEL is on: 2018 in [facility]  Next visit with Family Practice is on: 2018 in [facility]    Lab testing requirements:  Lipids annually.  Repeat lipids 6-8 weeks after dosage or drug change.    Last Lipids:  Chol: 157    2017  T    2017  HDL:   52    2017  LDL:  91    2017  LDL DIRECT:  No results found in past 5 years.    Max refills 12 months from last office visit.      Chart review shows that patient was seen by PCP for a preop on 17. Both rxs as requested were reviewed by PCP in office visit notes on that date. No changes noted to either rx. Patient was to follow up with PCP in a month or two for a hgb recheck. Is seeing PCP on 18 for follow up anemia. Writer will refill both rxs as requested at this time.    Prescription refilled per RN Medication Refill Policy.................... Mark Jama RN ....................  2018   2:50 PM

## 2018-02-13 NOTE — PROGRESS NOTES
"Patient Information     Patient Name MRN Sex Phi Waters [id number] Male 1950      Progress Notes by Gaston Bucio MD at 2018 12:30 PM     Author:  Gaston Bucio MD Service:  (none) Author Type:  Physician     Filed:  2018 10:24 PM Encounter Date:  2018 Status:  Signed     :  Gaston Bucio MD (Physician)            Nursing Notes:   Francoise Herrera  2018  1:05 PM  Signed  Patient comes in to the clinic today to follow up on his anemia.      SUBJECTIVE:  Phi Tabares is a 67 y.o. Male.  He comes in today at my request to follow up on his anemia.  He reports chronic anemia and reports he has been told he has iron deficiency from his crohns but also has B12 deficiency and has been on B12 shots every 2 weeks for a \"long time\".  He tells me when he was getting them once monthly he felt tired and mentally foggy.  He also had some shortness of breath remotely but no symptoms recently.  Feels well.  Has not had any black or bloody stools.  Not taking any oral iron.  No black or bloody stools.    Social History     Social History        Marital status:       Spouse name: N/A     Number of children:  N/A     Years of education:  N/A     Occupational History      Not on file.     Social History Main Topics          Smoking status:   Former Smoker      Quit date:  2013      Smokeless tobacco:   Never Used      Alcohol use   0.6 oz/week     1 Cans of beer per week        Comment: occasionally uses alcohol socially       Drug use:   Not on file      Sexual activity:   Not on file      Other Topics  Concern     Not on file      Social History Narrative     ROS:  Negative except as noted above.      OBJECTIVE:  /80  Pulse 60  Wt 96.6 kg (213 lb)  BMI 34.64 kg/m2  EXAM:  General Appearance: Pleasant, alert, appropriate appearance for age. No acute distress  Chest/Respiratory Exam: Normal chest wall and respirations. Clear to auscultation.  Cardiovascular " Exam: Regular rate and rhythm. S1, S2, no murmur, click, gallop, or rubs.  Gastrointestinal Exam: Soft, nontender, no abnormal masses or organomegaly.    Results for orders placed or performed in visit on 01/24/18      VITAMIN B12      Result  Value Ref Range    VITAMIN B12 1317 (H) 180 - 914 pg/mL   FOLIC ACID      Result  Value Ref Range    FOLIC ACID 14.3 >=5.2 ng/mL   IRON PLUS IRON BINDING CAP      Result  Value Ref Range    IRON 25 (L) 50 - 212 ug/dL    UIBC (UNSATURATED)  505.74 mg/dL    IRON BINDING CAPACITY, CALCULATED  530.74 (H) 245.00 - 400.00 ug/dL    IRON, % SATURATION  5 (L) 20 - 55 %   FERRITIN      Result  Value Ref Range    FERRITIN 10.9 (L) 23.9 - 336.2 ng/mL   CBC WITH AUTO DIFFERENTIAL      Result  Value Ref Range    WHITE BLOOD COUNT         5.6 4.5 - 11.0 thou/cu mm    RED BLOOD COUNT           4.57 4.30 - 5.90 mil/cu mm    HEMOGLOBIN                11.4 (L) 13.5 - 17.5 g/dL    HEMATOCRIT                37.5 37.0 - 53.0 %    MCV                       82 80 - 100 fL    MCH                       24.9 (L) 26.0 - 34.0 pg    MCHC                      30.4 (L) 32.0 - 36.0 g/dL    RDW                       17.1 (H) 11.5 - 15.5 %    PLATELET COUNT            208 140 - 440 thou/cu mm    MPV                       12.0 (H) 6.5 - 11.0 fL    NEUTROPHILS               80.1 (H) 42.0 - 72.0 %    LYMPHOCYTES               7.9 (L) 20.0 - 44.0 %    MONOCYTES                 9.2 <12.0 %    EOSINOPHILS               1.8 <8.0 %    BASOPHILS                 0.5 <3.0 %    IMMATURE GRANULOCYTES(METAS,MYELOS,PROS) 0.5 %    ABSOLUTE NEUTROPHILS      4.5 1.7 - 7.0 thou/cu mm    ABSOLUTE LYMPHOCYTES      0.4 (L) 0.9 - 2.9 thou/cu mm    ABSOLUTE MONOCYTES        0.5 <0.9 thou/cu mm    ABSOLUTE EOSINOPHILS      0.1 <0.5 thou/cu mm    ABSOLUTE BASOPHILS        0.0 <0.3 thou/cu mm    ABSOLUTE IMMATURE GRANULOCYTES(METAS,MYELOS,PROS) 0.0 <=0.3 thou/cu mm       ASSESSMENT/Plan :      Phi was seen today for follow  up.    Diagnoses and all orders for this visit:    Pernicious anemia  Patient currently being aggresively supplemented with B12 IM.  He is very concerned about reducing the dosage.  WIll check hematologic labs including CBC, B12 and iron studies.  COntinue current medications for now.    Labs returned and show elevated B12, likely of no benefit to have shots this frequent but he feels subjectively they are paramount to his health.  Will continue and discuss this with him again at a later date perhaps reducing to Q3wks.     His iron levels are a bit low, may be due to poor absorption with his crohns.  He currently has no symptoms of anemia.  Suggest recheck in 3 mos and consideration of iron supplementation until that visit.    -     CBC WITH DIFFERENTIAL; Future  -     VITAMIN B12; Future  -     FOLIC ACID; Future  -     IRON PLUS IRON BINDING CAP; Future  -     FERRITIN; Future  -     CBC WITH DIFFERENTIAL  -     VITAMIN B12  -     FOLIC ACID  -     IRON PLUS IRON BINDING CAP  -     FERRITIN  -     CBC WITH AUTO DIFFERENTIAL    Need for prophylactic vaccination against Streptococcus pneumoniae (pneumococcus)  -     PREVNAR 13 (AKA PNEUMOCOCCAL VACCINE 13-VALENT IM)  -     CA ADMIN VACC INITIAL    MINA   As noted above, will add some oral iron and recheck in 3 mos.    There are no Patient Instructions on file for this visit.    Gaston Bucio MD    This document was created using computer generated templates and voice activated software.

## 2018-02-13 NOTE — NURSING NOTE
Patient Information     Patient Name MRN Sex Phi Waters [id number] Male 1950      Nursing Note by Francoise Herrera at 2018 12:30 PM     Author:  Francoise Herrera Service:  (none) Author Type:  (none)     Filed:  2018  1:05 PM Encounter Date:  2018 Status:  Signed     :  Francoise Herrera            Patient comes in to the clinic today to follow up on his anemia.

## 2018-03-05 ENCOUNTER — DOCUMENTATION ONLY (OUTPATIENT)
Dept: FAMILY MEDICINE | Facility: OTHER | Age: 68
End: 2018-03-05

## 2018-03-05 PROBLEM — M19.90 OSTEOARTHROSIS: Status: ACTIVE | Noted: 2017-04-26

## 2018-03-05 PROBLEM — K21.00 GERD WITH ESOPHAGITIS: Status: ACTIVE | Noted: 2017-08-18

## 2018-03-05 PROBLEM — D51.0 PERNICIOUS ANEMIA: Status: ACTIVE | Noted: 2017-04-26

## 2018-03-05 PROBLEM — K50.919 CROHN'S DISEASE WITH COMPLICATION (H): Status: ACTIVE | Noted: 2017-04-26

## 2018-03-05 RX ORDER — MESALAMINE 1.2 G/1
TABLET, DELAYED RELEASE ORAL
COMMUNITY
Start: 2017-04-26 | End: 2020-06-02

## 2018-03-05 RX ORDER — PANTOPRAZOLE SODIUM 40 MG/1
40 TABLET, DELAYED RELEASE ORAL 2 TIMES DAILY PRN
COMMUNITY
Start: 2018-01-04 | End: 2018-07-30

## 2018-03-05 RX ORDER — ASPIRIN 81 MG/1
81 TABLET ORAL
COMMUNITY
Start: 2017-08-29 | End: 2021-01-01

## 2018-03-05 RX ORDER — TRAMADOL HYDROCHLORIDE 50 MG/1
50 TABLET ORAL EVERY 6 HOURS PRN
COMMUNITY
Start: 2017-04-26 | End: 2018-07-30

## 2018-03-05 RX ORDER — CYANOCOBALAMIN 1000 UG/ML
1000 INJECTION, SOLUTION INTRAMUSCULAR; SUBCUTANEOUS
COMMUNITY
Start: 2017-04-26 | End: 2018-06-11

## 2018-03-05 RX ORDER — ATORVASTATIN CALCIUM 10 MG/1
10 TABLET, FILM COATED ORAL DAILY
COMMUNITY
Start: 2018-01-04 | End: 2018-10-08

## 2018-03-05 RX ORDER — AZATHIOPRINE 50 MG/1
100 TABLET ORAL EVERY MORNING
Status: ON HOLD | COMMUNITY
Start: 2017-03-08 | End: 2020-07-15

## 2018-03-05 RX ORDER — PREDNISONE 5 MG/1
5 TABLET ORAL DAILY
COMMUNITY
Start: 2017-04-26 | End: 2018-07-31

## 2018-03-06 ENCOUNTER — DOCUMENTATION ONLY (OUTPATIENT)
Dept: FAMILY MEDICINE | Facility: OTHER | Age: 68
End: 2018-03-06

## 2018-05-16 DIAGNOSIS — M15.0 PRIMARY OSTEOARTHRITIS INVOLVING MULTIPLE JOINTS: Primary | ICD-10-CM

## 2018-05-16 NOTE — TELEPHONE ENCOUNTER
Patient stopped in today asking why it's taking so long to get a refill of his Tramadol. He says that [facility] has been trying to fax us for the last couple of weeks. But we did have an issue with his birth-date because previously it was down as [birth year] but he says Social Security/Medicare changed it and he can't get it corrected with them. He will bring his birth certificate in for updating and when he renews his 's license, he will bring that in too.   It appears the only refill request we did receive was from today.  He is ok waiting until JTC is in clinic tomorrow (Thursday) to have the refill addressed.  Mattie Rae CMA([facility])..................5/16/2018   11:03 AM

## 2018-05-17 RX ORDER — TRAMADOL HYDROCHLORIDE 50 MG/1
50 TABLET ORAL 2 TIMES DAILY PRN
Qty: 180 TABLET | Refills: 1 | Status: SHIPPED | OUTPATIENT
Start: 2018-05-17 | End: 2019-02-02

## 2018-06-11 DIAGNOSIS — D51.0 PERNICIOUS ANEMIA: Primary | ICD-10-CM

## 2018-06-13 RX ORDER — CYANOCOBALAMIN 1000 UG/ML
INJECTION, SOLUTION INTRAMUSCULAR; SUBCUTANEOUS
Qty: 6 ML | Refills: 0 | Status: SHIPPED | OUTPATIENT
Start: 2018-06-13 | End: 2018-07-31

## 2018-06-13 RX ORDER — PNV NO.95/FERROUS FUM/FOLIC AC 28MG-0.8MG
325 TABLET ORAL
COMMUNITY
Start: 2018-02-04 | End: 2019-08-22

## 2018-06-13 NOTE — TELEPHONE ENCOUNTER
Per note form OV on 1/24/2018:  Labs returned and show elevated B12, likely of no benefit to have shots this frequent but he feels subjectively they are paramount to his health.  Will continue and discuss this with him again at a later date perhaps reducing to Q3wks.     At that same OV, oral iron was added and Pt was to recheck in 3 months. Iron has not been rechecked and Pt has not followed up.    Cyanocobalamin 1000 mcg/ml inj, 1 ml  Last Written Prescription Date:  4/26/17  Last Fill Quantity: 6ml,   # refills: 3  Last Office Visit: 1/24/18    Called and spoke to Patient after verifying last name and date of birth. Pt transferred to scheduling line to set up follow-up appointment.    Future Office visit:    Next 5 appointments (look out 90 days)     Jul 31, 2018  1:00 PM CDT   Office Visit with Gaston Bucio MD   [facility]    [address] [address]   [phone]                 Prescription approved per [facility] Refill Protocol. For 90-day pedrito period at this time. Susie Chaudhary RN .............. 6/13/2018  11:13 AM

## 2018-07-23 NOTE — PROGRESS NOTES
Patient Information     Patient Name  Phi Tabares MRN  [id number] Sex  Male   1950      Letter by Gaston Bucio MD at      Author:  Gaston Bucio MD Service:  (none) Author Type:  (none)    Filed:   Encounter Date:  2018 Status:  (Other)         Phi Tabares  [address] [address]    2018      Dear Mr. Tabares,    Your lab results are listed below and your B12 is elevated.  I would suggest spacing out injections to every 3 weeks but I know you feel strongly about keeping them every two weeks.  I am ok with continuing the current dose but would like to see you back in 3months to discuss further.  Your iron is a bit low.  I sent in a prescription for oral iron to be taken once daily with a meal along with 500mg of oral vitamin C to help with absorption.  It may make your stools dark and cause some constipation.  We will plan on rechecking labs in 3 months.  Please continue on your current medications.    Contact us with any questions.    I'm sending along your actual numbers so that you will have them for your general interest and your records.       Take Care,   Gaston Bucio MD      Resulted Orders      VITAMIN B12 (Collected: 2018  1:35 PM)      Result  Value Ref Range    VITAMIN B12 1317 (H) 180 - 914 pg/mL   FOLIC ACID (Collected: 2018  1:35 PM)      Result  Value Ref Range    FOLIC ACID 14.3 >=5.2 ng/mL   IRON PLUS IRON BINDING CAP (Collected: 2018  1:35 PM)      Result  Value Ref Range    IRON 25 (L) 50 - 212 ug/dL    UIBC (UNSATURATED)  505.74 mg/dL    IRON BINDING CAPACITY, CALCULATED  530.74 (H) 245.00 - 400.00 ug/dL    IRON, % SATURATION  5 (L) 20 - 55 %   FERRITIN (Collected: 2018  1:35 PM)      Result  Value Ref Range    FERRITIN 10.9 (L) 23.9 - 336.2 ng/mL   CBC WITH AUTO DIFFERENTIAL (Collected: 2018  1:35 PM)      Result  Value Ref Range    WHITE BLOOD COUNT         5.6 4.5 - 11.0 thou/cu mm    RED BLOOD COUNT            4.57 4.30 - 5.90 mil/cu mm    HEMOGLOBIN                11.4 (L) 13.5 - 17.5 g/dL    HEMATOCRIT                37.5 37.0 - 53.0 %    MCV                       82 80 - 100 fL    MCH                       24.9 (L) 26.0 - 34.0 pg    MCHC                      30.4 (L) 32.0 - 36.0 g/dL    RDW                       17.1 (H) 11.5 - 15.5 %    PLATELET COUNT            208 140 - 440 thou/cu mm    MPV                       12.0 (H) 6.5 - 11.0 fL    NEUTROPHILS               80.1 (H) 42.0 - 72.0 %    LYMPHOCYTES               7.9 (L) 20.0 - 44.0 %    MONOCYTES                 9.2 <12.0 %    EOSINOPHILS               1.8 <8.0 %    BASOPHILS                 0.5 <3.0 %    IMMATURE GRANULOCYTES(METAS,MYELOS,PROS) 0.5 %    ABSOLUTE NEUTROPHILS      4.5 1.7 - 7.0 thou/cu mm    ABSOLUTE LYMPHOCYTES      0.4 (L) 0.9 - 2.9 thou/cu mm    ABSOLUTE MONOCYTES        0.5 <0.9 thou/cu mm    ABSOLUTE EOSINOPHILS      0.1 <0.5 thou/cu mm    ABSOLUTE BASOPHILS        0.0 <0.3 thou/cu mm    ABSOLUTE IMMATURE GRANULOCYTES(METAS,MYELOS,PROS) 0.0 <=0.3 thou/cu mm

## 2018-07-23 NOTE — PROGRESS NOTES
Patient Information     Patient Name  Phi Tabares MRN  [id number] Sex  Male   1950      Letter by Gaston Bucio MD at      Author:  Gaston Bucio MD Service:  (none) Author Type:  (none)    Filed:   Encounter Date:  2017 Status:  (Other)       Phi Tabares  [address] [address]    2017      Dear Mr. Tabares,    Your lab results are listed below.  Your cholesterol has improved nicely on the medication.  Please continue it.  Your hemoglobin is a little low, we should recheck that in a month or two.  If you have any black or bloody stool or symptoms that concern you for anemia like shortness of breath or increasing fatigue please come back urgently for retest.  Please continue on your current medications.    Contact us with any questions.    I'm sending along your actual numbers so that you will have them for your general interest and your records.       Take Care,   Gaston Bucio MD      Resulted Orders      LIPID PANEL (Collected: 2017 12:41 PM)      Result  Value Ref Range    CHOLESTEROL,TOTAL 157 <200 mg/dL    TRIGLYCERIDES 69 <150 mg/dL    HDL CHOLESTEROL 52 23 - 92 mg/dL    NON-HDL CHOLESTEROL 105 <145 mg/dl    CHOL/HDL RATIO            3.02 <4.50                    LDL CHOLESTEROL 91 <100 mg/dL    PROVIDER ORDERED STATUS RANDOM    CBC WITH AUTO DIFFERENTIAL (Collected: 2017 12:41 PM)      Result  Value Ref Range    WHITE BLOOD COUNT         4.7 4.5 - 11.0 thou/cu mm    RED BLOOD COUNT           4.40 4.30 - 5.90 mil/cu mm    HEMOGLOBIN                11.5 (L) 13.5 - 17.5 g/dL    HEMATOCRIT                37.2 37.0 - 53.0 %    MCV                       85 80 - 100 fL    MCH                       26.1 26.0 - 34.0 pg    MCHC                      30.9 (L) 32.0 - 36.0 g/dL    RDW                       16.7 (H) 11.5 - 15.5 %    PLATELET COUNT            225 140 - 440 thou/cu mm    MPV                       12.2 (H) 6.5 - 11.0 fL     NEUTROPHILS               75.2 (H) 42.0 - 72.0 %    LYMPHOCYTES               9.7 (L) 20.0 - 44.0 %    MONOCYTES                 11.4 <12.0 %    EOSINOPHILS               2.5 <8.0 %    BASOPHILS                 0.8 <3.0 %    IMMATURE GRANULOCYTES(METAS,MYELOS,PROS) 0.4 %    ABSOLUTE NEUTROPHILS      3.6 1.7 - 7.0 thou/cu mm    ABSOLUTE LYMPHOCYTES      0.5 (L) 0.9 - 2.9 thou/cu mm    ABSOLUTE MONOCYTES        0.5 <0.9 thou/cu mm    ABSOLUTE EOSINOPHILS      0.1 <0.5 thou/cu mm    ABSOLUTE BASOPHILS        0.0 <0.3 thou/cu mm    ABSOLUTE IMMATURE GRANULOCYTES(METAS,MYELOS,PROS) 0.0 <=0.3 thou/cu mm

## 2018-07-31 ENCOUNTER — HOSPITAL ENCOUNTER (OUTPATIENT)
Dept: GENERAL RADIOLOGY | Facility: OTHER | Age: 68
Discharge: HOME | End: 2018-07-31
Attending: FAMILY MEDICINE | Admitting: FAMILY MEDICINE
Payer: MEDICARE

## 2018-07-31 ENCOUNTER — OFFICE VISIT (OUTPATIENT)
Dept: FAMILY MEDICINE | Facility: OTHER | Age: 68
End: 2018-07-31
Attending: FAMILY MEDICINE
Payer: MEDICARE

## 2018-07-31 VITALS
DIASTOLIC BLOOD PRESSURE: 82 MMHG | HEART RATE: 60 BPM | SYSTOLIC BLOOD PRESSURE: 136 MMHG | BODY MASS INDEX: 33.67 KG/M2 | WEIGHT: 207 LBS

## 2018-07-31 DIAGNOSIS — D51.0 PERNICIOUS ANEMIA: ICD-10-CM

## 2018-07-31 DIAGNOSIS — M75.51 SUBACROMIAL BURSITIS OF RIGHT SHOULDER JOINT: ICD-10-CM

## 2018-07-31 DIAGNOSIS — M25.512 CHRONIC PAIN OF BOTH SHOULDERS: Primary | ICD-10-CM

## 2018-07-31 DIAGNOSIS — K50.919 CROHN'S DISEASE WITH COMPLICATION, UNSPECIFIED GASTROINTESTINAL TRACT LOCATION (H): ICD-10-CM

## 2018-07-31 DIAGNOSIS — M25.512 CHRONIC PAIN OF BOTH SHOULDERS: ICD-10-CM

## 2018-07-31 DIAGNOSIS — G89.29 CHRONIC PAIN OF BOTH SHOULDERS: Primary | ICD-10-CM

## 2018-07-31 DIAGNOSIS — M25.511 CHRONIC PAIN OF BOTH SHOULDERS: Primary | ICD-10-CM

## 2018-07-31 DIAGNOSIS — G89.29 CHRONIC PAIN OF BOTH SHOULDERS: ICD-10-CM

## 2018-07-31 DIAGNOSIS — M25.511 CHRONIC PAIN OF BOTH SHOULDERS: ICD-10-CM

## 2018-07-31 LAB
BASOPHILS # BLD AUTO: 0 10E9/L (ref 0–0.2)
BASOPHILS NFR BLD AUTO: 0.3 %
DIFFERENTIAL METHOD BLD: ABNORMAL
EOSINOPHIL # BLD AUTO: 0.2 10E9/L (ref 0–0.7)
EOSINOPHIL NFR BLD AUTO: 2.6 %
ERYTHROCYTE [DISTWIDTH] IN BLOOD BY AUTOMATED COUNT: 14.2 % (ref 10–15)
FERRITIN SERPL-MCNC: 73 NG/ML (ref 23.9–336.2)
HCT VFR BLD AUTO: 48.1 % (ref 40–53)
HGB BLD-MCNC: 16.7 G/DL (ref 13.3–17.7)
IMM GRANULOCYTES # BLD: 0 10E9/L (ref 0–0.4)
IMM GRANULOCYTES NFR BLD: 0.5 %
IRON SATN MFR SERPL: 36 % (ref 20–55)
IRON SERPL-MCNC: 149 UG/DL (ref 50–212)
LYMPHOCYTES # BLD AUTO: 0.6 10E9/L (ref 0.8–5.3)
LYMPHOCYTES NFR BLD AUTO: 9 %
MCH RBC QN AUTO: 33.5 PG (ref 26.5–33)
MCHC RBC AUTO-ENTMCNC: 34.7 G/DL (ref 31.5–36.5)
MCV RBC AUTO: 96 FL (ref 78–100)
MONOCYTES # BLD AUTO: 0.7 10E9/L (ref 0–1.3)
MONOCYTES NFR BLD AUTO: 10.9 %
NEUTROPHILS # BLD AUTO: 4.8 10E9/L (ref 1.6–8.3)
NEUTROPHILS NFR BLD AUTO: 76.7 %
PLATELET # BLD AUTO: 168 10E9/L (ref 150–450)
RBC # BLD AUTO: 4.99 10E12/L (ref 4.4–5.9)
TIBC SERPL-MCNC: 414.4 UG/DL (ref 245–400)
UIBC (UNSATURATED): 265.4 MG/DL
VIT B12 SERPL-MCNC: 1334 PG/ML (ref 180–914)
WBC # BLD AUTO: 6.2 10E9/L (ref 4–11)

## 2018-07-31 PROCEDURE — 20610 DRAIN/INJ JOINT/BURSA W/O US: CPT | Mod: RT | Performed by: FAMILY MEDICINE

## 2018-07-31 PROCEDURE — 82607 VITAMIN B-12: CPT | Performed by: FAMILY MEDICINE

## 2018-07-31 PROCEDURE — 82728 ASSAY OF FERRITIN: CPT | Performed by: FAMILY MEDICINE

## 2018-07-31 PROCEDURE — G0463 HOSPITAL OUTPT CLINIC VISIT: HCPCS

## 2018-07-31 PROCEDURE — 83550 IRON BINDING TEST: CPT | Performed by: FAMILY MEDICINE

## 2018-07-31 PROCEDURE — G0463 HOSPITAL OUTPT CLINIC VISIT: HCPCS | Mod: 25

## 2018-07-31 PROCEDURE — 85025 COMPLETE CBC W/AUTO DIFF WBC: CPT | Performed by: FAMILY MEDICINE

## 2018-07-31 PROCEDURE — 99213 OFFICE O/P EST LOW 20 MIN: CPT | Mod: 25 | Performed by: FAMILY MEDICINE

## 2018-07-31 PROCEDURE — 20610 DRAIN/INJ JOINT/BURSA W/O US: CPT | Performed by: FAMILY MEDICINE

## 2018-07-31 PROCEDURE — 25000128 H RX IP 250 OP 636: Performed by: FAMILY MEDICINE

## 2018-07-31 PROCEDURE — 83540 ASSAY OF IRON: CPT | Performed by: FAMILY MEDICINE

## 2018-07-31 PROCEDURE — 36415 COLL VENOUS BLD VENIPUNCTURE: CPT | Performed by: FAMILY MEDICINE

## 2018-07-31 PROCEDURE — 73030 X-RAY EXAM OF SHOULDER: CPT | Mod: RT

## 2018-07-31 RX ORDER — PREDNISONE 5 MG/1
TABLET ORAL
COMMUNITY
Start: 2018-07-31 | End: 2019-08-22

## 2018-07-31 RX ORDER — CYANOCOBALAMIN 1000 UG/ML
INJECTION, SOLUTION INTRAMUSCULAR; SUBCUTANEOUS
Qty: 6 ML | Refills: 0 | COMMUNITY
Start: 2018-07-31 | End: 2019-08-22

## 2018-07-31 RX ORDER — TRIAMCINOLONE ACETONIDE 40 MG/ML
80 INJECTION, SUSPENSION INTRA-ARTICULAR; INTRAMUSCULAR ONCE
Status: COMPLETED | OUTPATIENT
Start: 2018-07-31 | End: 2018-07-31

## 2018-07-31 RX ADMIN — TRIAMCINOLONE ACETONIDE 80 MG: 40 INJECTION, SUSPENSION INTRA-ARTICULAR; INTRAMUSCULAR at 16:25

## 2018-07-31 NOTE — MR AVS SNAPSHOT
After Visit Summary   7/31/2018    Phi Tabares    MRN: [id number]           Patient Information     Date Of Birth          1950        Visit Information        Provider Department      7/31/2018 1:00 PM Gaston Bucio MD [facility]        Today's Diagnoses     Chronic pain of both shoulders    -  1    Pernicious anemia        Crohn's disease with complication, unspecified gastrointestinal tract location (H)        Subacromial bursitis of right shoulder joint           Follow-ups after your visit        Future tests that were ordered for you today     Open Future Orders        Priority Expected Expires Ordered    XR Shoulder Right G/E 3 Views Routine 7/31/2018 7/31/2019 7/31/2018            Who to contact     If you have questions or need follow up information about today's clinic visit or your schedule please contact [facility] directly at [phone].  Normal or non-critical lab and imaging results will be communicated to you by MyChart, letter or phone within 4 business days after the clinic has received the results. If you do not hear from us within 7 days, please contact the clinic through MyChart or phone. If you have a critical or abnormal lab result, we will notify you by phone as soon as possible.  Submit refill requests through [employer] or call your pharmacy and they will forward the refill request to us. Please allow 3 business days for your refill to be completed.          Additional Information About Your Visit        Care EveryWhere ID     This is your Care EveryWhere ID. This could be used by other organizations to access your [facility] medical records  [id number]        Your Vitals Were     Pulse BMI (Body Mass Index)                60 33.67 kg/m2           Blood Pressure from Last 3 Encounters:   07/31/18 136/82   01/24/18 120/80   11/22/17 136/90    Weight from Last 3 Encounters:   07/31/18 207 lb (93.9 kg)   01/24/18 213 lb (96.6 kg)   11/22/17  208 lb 9.6 oz (94.6 kg)              We Performed the Following     CBC and Differential     Ferritin     Iron Binding Panel     Large Joint/Bursa injection and/or drainage (Shoulder, Knee)     Vitamin B12          Today's Medication Changes          These changes are accurate as of 7/31/18 11:59 PM.  If you have any questions, ask your nurse or doctor.               These medicines have changed or have updated prescriptions.        Dose/Directions    cyanocobalamin 1000 MCG/ML injection   Commonly known as:  VITAMIN B12   This may have changed:  See the new instructions.   Used for:  Pernicious anemia   Changed by:  Gaston Bucio MD        Inject 1 ml IM every 3 weeks.   Quantity:  6 mL   Refills:  0       predniSONE 5 MG tablet   Commonly known as:  DELTASONE   This may have changed:    - how much to take  - how to take this  - when to take this  - additional instructions   Changed by:  Gaston Bucio MD        1.25 mg daily   Refills:  0         Stop taking these medicines if you haven't already. Please contact your care team if you have questions.     ILEVRO 0.3 % ophthalmic suspension   Generic drug:  nepafenac   Stopped by:  Gaston Bucio MD           simvastatin 40 MG tablet   Commonly known as:  ZOCOR   Stopped by:  Gaston Bucio MD                    Primary Care Provider Office Phone # Fax #    Gaston Bucio [phone] [address] [facility] [address]        Equal Access to Services     [facility] AH: Hadii grabiel ku hadasho Soomaali, waaxda luqadaha, qaybta kaalmada adeegyada, polly riggs. So [facility] [phone].    ATENCIÓN: Si habla español, tiene a vazquez disposición servicios gratuitos de asistencia lingüística. Elena al [phone].    We comply with applicable federal civil rights laws and [place] laws. We do not discriminate on the basis of race, color, national origin, age, disability, sex, sexual orientation, or  "gender identity.            Thank you!     Thank you for choosing [facility]  for your care. Our goal is always to provide you with excellent care. Hearing back from our patients is one way we can continue to improve our services. Please take a few minutes to complete the written survey that you may receive in the mail after your visit with us. Thank you!             Your Updated Medication List - Protect others around you: Learn how to safely use, store and throw away your medicines at www.disposemymeds.org.          This list is accurate as of 7/31/18 11:59 PM.  Always use your most recent med list.                   Brand Name Dispense Instructions for use Diagnosis    aspirin 81 MG EC tablet      Take 81 mg by mouth daily with food        atorvastatin 10 MG tablet    LIPITOR     Take 10 mg by mouth daily        azaTHIOprine 50 MG tablet    IMURAN     Take 150 tablets by mouth every morning        BD DISP NEEDLES 22G X 1-1/2\" Misc   Generic drug:  Needle (Disp)     2 each    USE FOR VITAMIN B-12 INJECTIONS EVERY 2 WEEKS    Anemia       cyanocobalamin 1000 MCG/ML injection    VITAMIN B12    6 mL    Inject 1 ml IM every 3 weeks.    Pernicious anemia       iron 325 (65 Fe) MG tablet      Take 325 mg by mouth        mesalamine 1.2 g EC tablet    LIALDA     3 tablets daily.        NONFORMULARY      ascol one tab every day        pantoprazole 40 MG EC tablet    PROTONIX    180 tablet    TAKE AS DIRECTED    Gastroesophageal reflux disease without esophagitis       predniSONE 5 MG tablet    DELTASONE     1.25 mg daily        PROBIOTIC ACIDOPHILUS BIOBEADS Caps      Probiotic & Acidophilus 300 million cell 250 mg Cap        traMADol 50 MG tablet    ULTRAM    180 tablet    Take 1 tablet (50 mg) by mouth 2 times daily as needed for severe pain    Primary osteoarthritis involving multiple joints         "

## 2018-07-31 NOTE — NURSING NOTE
Patient comes in to the clinic today to recheck labs as requested at his previous visit and to evaluate right shoulder pain.

## 2018-07-31 NOTE — LETTER
Phi Mishralps  [address] [facility] [address]    8/1/2018      Dear ,    Your lab results are listed below and your labs look quite good.  Your B12 remains high and you could consider spacing out your supplementation further.  Your iron stores are much better and your hemoglobin is entirely normal.  Please continue on your current medications.    Contact us with any questions.    I'm sending along your actual numbers so that youwill have them for your general interest and your records.       Take Care,   Gaston Bucio      Resulted Orders   Vitamin B12   Result Value Ref Range    Vitamin B12 1334 (H) 180 - 914 pg/mL   CBC and Differential   Result Value Ref Range    WBC 6.2 4.0 - 11.0 10e9/L    RBC Count 4.99 4.4 - 5.9 10e12/L    Hemoglobin 16.7 13.3 - 17.7 g/dL    Hematocrit 48.1 40.0 - 53.0 %    MCV 96 78 - 100 fl    MCH 33.5 (H) 26.5 - 33.0 pg    MCHC 34.7 31.5 - 36.5 g/dL    RDW 14.2 10.0 - 15.0 %    Platelet Count 168 150 - 450 10e9/L    Diff Method Automated Method     % Neutrophils 76.7 %    % Lymphocytes 9.0 %    % Monocytes 10.9 %    % Eosinophils 2.6 %    % Basophils 0.3 %    % Immature Granulocytes 0.5 %    Absolute Neutrophil 4.8 1.6 - 8.3 10e9/L    Absolute Lymphocytes 0.6 (L) 0.8 - 5.3 10e9/L    Absolute Monocytes 0.7 0.0 - 1.3 10e9/L    Absolute Eosinophils 0.2 0.0 - 0.7 10e9/L    Absolute Basophils 0.0 0.0 - 0.2 10e9/L    Abs Immature Granulocytes 0.0 0 - 0.4 10e9/L   Iron Binding Panel   Result Value Ref Range    Iron 149 50 - 212 ug/dL    UIBC (Unsaturated) 265.40 mg/dL    Iron Binding Capacity 414.40 (H) 245.00 - 400.00 ug/dL    Iron Saturation 36 20 - 55 %   Ferritin   Result Value Ref Range    Ferritin 73 23.9 - 336.2 ng/mL

## 2018-08-01 NOTE — PROGRESS NOTES
Nursing Notes:   Francoise Herrera, LPN  7/31/2018  1:32 PM  Signed  Patient comes in to the clinic today to recheck labs as requested at his previous visit and to evaluate right shoulder pain.      SUBJECTIVE:  Phi Tabares is a 67 year old male comes in today to follow-up on multiple chronic issues.  He has a history of Crohn's disease.  He is currently on 1.25 mg of prednisone, 1.2G mesalamine tid and 150 mg of azathioprine.  He reports no current intestinal issues.  He has not had any significant abdominal pain.    He has chronically been on B12 injections every 2 weeks.  Last January we spaced out his injections to every 3 weeks and started him on some oral iron due to evidence of iron deficiency anemia.  He is here to follow-up on labs.  Reports that he feels well and has no symptoms of anemia.  He has not had any black or bloody stools.    He does use tramadol twice daily for osteoarthritis.  Has not had any side effects from that.    Patient has been taking Lipitor as prescribed.  He has not had any cardiopulmonary symptoms.    Patient does report significant right shoulder pain.  He reports he has had pain in both of his shoulders going back for 15 years.  Symptoms seem to wax and wane.  Right shoulder has been much more painful over the past couple of months.  Really not able to move it much.  He has modified activities of both shoulders to minimize any overhead activity.  Sometimes the pain can radiate down his right arm to the elbow.  He does not have any numbness or weakness of the hand.  Does not seem to better worse at certain times a day.  Ibuprofen helps on occasion.  Tylenol has been minimally effective.  Tramadol seems to be the most helpful.  No recent trauma.    Current Outpatient Prescriptions   Medication Sig Dispense Refill     aspirin EC 81 MG EC tablet Take 81 mg by mouth daily with food       atorvastatin (LIPITOR) 10 MG tablet Take 10 mg by mouth daily       azaTHIOprine (IMURAN) 50 MG  "tablet Take 150 tablets by mouth every morning       BD DISP NEEDLES 22G X 1-1/2\" MISC USE FOR VITAMIN B-12 INJECTIONS EVERY 2 WEEKS 2 each 1     cyanocobalamin (VITAMIN B12) 1000 MCG/ML injection Inject 1 ml IM every 3 weeks. 6 mL 0     Ferrous Sulfate (IRON) 325 (65 Fe) MG tablet Take 325 mg by mouth       mesalamine (LIALDA) 1.2 G EC tablet 3 tablets daily.       NONFORMULARY ascol one tab every day       pantoprazole (PROTONIX) 40 MG EC tablet TAKE AS DIRECTED 180 tablet 1     predniSONE (DELTASONE) 5 MG tablet 1.25 mg daily       Probiotic Product (PROBIOTIC ACIDOPHILUS) CAPS Probiotic & Acidophilus 300 million cell 250 mg Cap       traMADol (ULTRAM) 50 MG tablet Take 1 tablet (50 mg) by mouth 2 times daily as needed for severe pain 180 tablet 1       /82  Pulse 60  Wt 207 lb (93.9 kg)  BMI 33.67 kg/m2    Exam:  GEN: no apparent distress   RESP: Clear toascultation bilaterally.  No increased respiratory effort.   CV: Regularrate and rhythm.  No murmurs rubs or gallops heard.   GI: Abdomen Soft, non-tender.  No masses, rebound or guarding.   MSK: No Tenderness over AC joint, clavicle, posterior shoulder or biceps tendon.  Tenderness with palpation of the lateral shoulder.  PROM FULL. AROM limited to 80 degrees of abduction due to pain.  Resisted abduction and external rotation painful.  Empty Can test- Positive; Speeds- Negative; Impingement sign- Positive.        ASSESSMENT/Plan :    Results for orders placed or performed in visit on 07/31/18   Vitamin B12   Result Value Ref Range    Vitamin B12 1334 (H) 180 - 914 pg/mL   CBC and Differential   Result Value Ref Range    WBC 6.2 4.0 - 11.0 10e9/L    RBC Count 4.99 4.4 - 5.9 10e12/L    Hemoglobin 16.7 13.3 - 17.7 g/dL    Hematocrit 48.1 40.0 - 53.0 %    MCV 96 78 - 100 fl    MCH 33.5 (H) 26.5 - 33.0 pg    MCHC 34.7 31.5 - 36.5 g/dL    RDW 14.2 10.0 - 15.0 %    Platelet Count 168 150 - 450 10e9/L    Diff Method Automated Method     % Neutrophils 76.7 %    " % Lymphocytes 9.0 %    % Monocytes 10.9 %    % Eosinophils 2.6 %    % Basophils 0.3 %    % Immature Granulocytes 0.5 %    Absolute Neutrophil 4.8 1.6 - 8.3 10e9/L    Absolute Lymphocytes 0.6 (L) 0.8 - 5.3 10e9/L    Absolute Monocytes 0.7 0.0 - 1.3 10e9/L    Absolute Eosinophils 0.2 0.0 - 0.7 10e9/L    Absolute Basophils 0.0 0.0 - 0.2 10e9/L    Abs Immature Granulocytes 0.0 0 - 0.4 10e9/L   Iron Binding Panel   Result Value Ref Range    Iron 149 50 - 212 ug/dL    UIBC (Unsaturated) 265.40 mg/dL    Iron Binding Capacity 414.40 (H) 245.00 - 400.00 ug/dL    Iron Saturation 36 20 - 55 %   Ferritin   Result Value Ref Range    Ferritin 73 23.9 - 336.2 ng/mL       No images are attached to the encounter or orders placed in the encounter.      1. Pernicious anemia  B12 level continues to be elevated.  I think he can safely space this out to once monthly.  He is anxious about this prospect and would like to hold off.  I think it is unlikely to be causing significant harm and we can be patient with him on this.  - cyanocobalamin (VITAMIN B12) 1000 MCG/ML injection; Inject 1 ml IM every 3 weeks.  Dispense: 6 mL; Refill: 0    2. Chronic pain of both shoulders  X-rays show some mild arthritis but based on his exam as well as imaging I suspect this is more likely due to chronic rotator cuff injury with subacromial bursitis.  Discussed options.  He would like to pursue trial of injection. Risks, benefits and alternatives discussed with patient.  Theyvoiced good understanding and had the opportunity to ask questions.  Patient gave consent and proceed with procedure.     After chlorhexadine prep sterile procedure was used to inject 40mg of kenalog, 1ml of 1% lidocaine without epinephrine and 1ml of 0.25% bupivicaine into the R subacromial bursa using 1.5 inch 25 gauge needle and lateral approach.  Total volume of 5ml injected with minimal resistance into the space without complication.  Patient had excellent improvement in symptoms  following injection.     - XR Shoulder Right G/E 3 Views; Future    3. Crohn's disease with complication, unspecified gastrointestinal tract location (H)  Patient has had excellent response with oral iron therapy.  I think he can remain off the iron therapy now going forward and we will plan on rechecking his labs in 3-6 months, sooner with any symptoms of anemia or evidence of GI bleeding.  - Vitamin B12; Future  - CBC and Differential; Future  - Iron Binding Panel; Future  - Ferritin; Future  - Vitamin B12  - CBC and Differential  - Iron Binding Panel  - Ferritin    4. Subacromial bursitis of right shoulder joint  - Large Joint/Bursa injection and/or drainage (Shoulder, Knee)  - triamcinolone acetonide (KENALOG-40) injection 80 mg; 2 mLs (80 mg) by INTRA-ARTICULAR route once          There are no Patient Instructions on file for this visit.    Gaston Bucio    This document was created using computer generated templates and voiceactivated software.

## 2018-08-14 ENCOUNTER — TELEPHONE (OUTPATIENT)
Dept: FAMILY MEDICINE | Facility: OTHER | Age: 68
End: 2018-08-14

## 2018-08-14 DIAGNOSIS — M75.111 INCOMPLETE TEAR OF RIGHT ROTATOR CUFF: Primary | ICD-10-CM

## 2018-08-20 ENCOUNTER — HOSPITAL ENCOUNTER (OUTPATIENT)
Dept: MRI IMAGING | Facility: OTHER | Age: 68
Discharge: HOME | End: 2018-08-20
Attending: FAMILY MEDICINE | Admitting: FAMILY MEDICINE
Payer: MEDICARE

## 2018-08-20 DIAGNOSIS — M75.111 INCOMPLETE TEAR OF RIGHT ROTATOR CUFF: ICD-10-CM

## 2018-08-20 PROCEDURE — 73221 MRI JOINT UPR EXTREM W/O DYE: CPT | Mod: RT

## 2018-08-27 ENCOUNTER — TELEPHONE (OUTPATIENT)
Dept: FAMILY MEDICINE | Facility: OTHER | Age: 68
End: 2018-08-27

## 2018-08-27 DIAGNOSIS — S43.439A GLENOID LABRUM TEAR, UNSPECIFIED LATERALITY, INITIAL ENCOUNTER: Primary | ICD-10-CM

## 2018-08-27 DIAGNOSIS — M75.110 INCOMPLETE TEAR OF ROTATOR CUFF, UNSPECIFIED LATERALITY: ICD-10-CM

## 2018-08-28 NOTE — TELEPHONE ENCOUNTER
Letter sent.  He has a few issues that could be causing pain.  He has a partial rotator cuff tear.  He also has degenerative disease of the AC joint.  Finally he has labral tearing.  Sometimes injections can calm this down but given the multiple significant issues I would suggest he see an orthopaedic surgeon and they can discuss surgery versus injections with him.

## 2018-09-19 ENCOUNTER — TRANSFERRED RECORDS (OUTPATIENT)
Dept: HEALTH INFORMATION MANAGEMENT | Facility: OTHER | Age: 68
End: 2018-09-19

## 2018-09-21 ENCOUNTER — OFFICE VISIT (OUTPATIENT)
Dept: FAMILY MEDICINE | Facility: OTHER | Age: 68
End: 2018-09-21
Attending: PHYSICIAN ASSISTANT
Payer: MEDICARE

## 2018-09-21 VITALS
BODY MASS INDEX: 33.11 KG/M2 | WEIGHT: 206 LBS | DIASTOLIC BLOOD PRESSURE: 82 MMHG | HEIGHT: 66 IN | TEMPERATURE: 97.7 F | SYSTOLIC BLOOD PRESSURE: 133 MMHG | HEART RATE: 65 BPM | OXYGEN SATURATION: 97 %

## 2018-09-21 DIAGNOSIS — E78.5 HYPERLIPIDEMIA, UNSPECIFIED HYPERLIPIDEMIA TYPE: ICD-10-CM

## 2018-09-21 DIAGNOSIS — M75.111 INCOMPLETE TEAR OF RIGHT ROTATOR CUFF: ICD-10-CM

## 2018-09-21 DIAGNOSIS — K21.9 GASTROESOPHAGEAL REFLUX DISEASE, ESOPHAGITIS PRESENCE NOT SPECIFIED: ICD-10-CM

## 2018-09-21 DIAGNOSIS — Z01.818 PRE-OP EXAM: Primary | ICD-10-CM

## 2018-09-21 LAB
ALBUMIN SERPL-MCNC: 4.3 G/DL (ref 3.5–5.7)
ALP SERPL-CCNC: 68 U/L (ref 34–104)
ALT SERPL W P-5'-P-CCNC: 38 U/L (ref 7–52)
ANION GAP SERPL CALCULATED.3IONS-SCNC: 7 MMOL/L (ref 3–14)
AST SERPL W P-5'-P-CCNC: 32 U/L (ref 13–39)
BILIRUB SERPL-MCNC: 0.7 MG/DL (ref 0.3–1)
BUN SERPL-MCNC: 16 MG/DL (ref 7–25)
CALCIUM SERPL-MCNC: 9.4 MG/DL (ref 8.6–10.3)
CHLORIDE SERPL-SCNC: 107 MMOL/L (ref 98–107)
CO2 SERPL-SCNC: 23 MMOL/L (ref 21–31)
CREAT SERPL-MCNC: 0.9 MG/DL (ref 0.7–1.3)
GFR SERPL CREATININE-BSD FRML MDRD: 85 ML/MIN/1.7M2
GLUCOSE SERPL-MCNC: 98 MG/DL (ref 70–105)
POTASSIUM SERPL-SCNC: 4.3 MMOL/L (ref 3.5–5.1)
PROT SERPL-MCNC: 6.8 G/DL (ref 6.4–8.9)
SODIUM SERPL-SCNC: 137 MMOL/L (ref 134–144)

## 2018-09-21 PROCEDURE — 93010 ELECTROCARDIOGRAM REPORT: CPT | Performed by: INTERNAL MEDICINE

## 2018-09-21 PROCEDURE — G0463 HOSPITAL OUTPT CLINIC VISIT: HCPCS

## 2018-09-21 PROCEDURE — G0463 HOSPITAL OUTPT CLINIC VISIT: HCPCS | Mod: 25

## 2018-09-21 PROCEDURE — 80053 COMPREHEN METABOLIC PANEL: CPT | Performed by: PHYSICIAN ASSISTANT

## 2018-09-21 PROCEDURE — 99214 OFFICE O/P EST MOD 30 MIN: CPT | Mod: 25 | Performed by: PHYSICIAN ASSISTANT

## 2018-09-21 PROCEDURE — 93005 ELECTROCARDIOGRAM TRACING: CPT

## 2018-09-21 PROCEDURE — 36415 COLL VENOUS BLD VENIPUNCTURE: CPT | Performed by: PHYSICIAN ASSISTANT

## 2018-09-21 NOTE — PROGRESS NOTES
----------------- PREOPERATIVE EXAM ------------------  9/21/2018    SUBJECTIVE:  Phi Tabares is a 67 year old male here for preoperative optimization.    I was asked to see Phi Tabares by Dr. Vasquez for a preoperative optimization due to history of Crohn's, GERD and hyperlipidemia.    Patient has a history of right shoulder pain, overuse.  Pain with movement. Pain in right shoulder since July 2018. No recent trauma. No bruising, swelling. Use ibuprofen as needed.      Patient follows with gastroenterology in regards to his Crohn's colitis.  Has an appointment with his doctor on Monday at [facility]. Dr. Rodney Box.   They monitor the patient's current disease.  Currently treated and stable with his medication.  No acute flares.  No stomachaches, nausea, vomiting, diarrhea, constipation, blood in stool or urine.  No recent cough or cold symptoms.  No dysuria, frequency, urgency.  No chest pain, palpitations, problems breathing, sore throat, ear pain.  Currently taking prednisone 1.25 mg daily.  Working on weaning off of prednisone.  He is stable currently with his Crohn's.    Patient has history of hyperlipidemia.  Currently well controlled with atorvastatin 10 mg.  No side effects noted with medication.    Nursing Notes:   Marisol Beaver LPN  9/21/2018  1:48 PM  Addendum  Date of Surgery: 9/27/18  Type of Surgery: Right shoulder cope and debridement  Surgeon: Dr. Vasquez  Hospital:  [facility]  Fax: [phone]    Fever/Chills or other infectious symptoms in past month: NO  >10lb weight loss in past two months: NO  O2 SAT:     Health Care Directive/Code status:  YEs  Hx of blood transfusions:    NO  Td up to date:  2/20/14  History of VRE/MRSA:  NO Date:    Preoperative Evaluation: Obstructive Sleep Apnea screening    S: Snore -  Do you snore loudly? (louder than talking or loud enough to be heard through closed doors)NO  T: Tired - Do you often feel tired, fatigued, or sleepy during the  "daytime?NO  O: Observed - Has anyone ever observed you stop breathing during your sleep?NO  P: Pressure - Do you have or are you being treated for high blood pressure?NO  B: BMI - BMI greater than 35kg/m2? NO  A: Age - Age over 50 years old?NO  N: Neck - Neck circumference greater than 40 cm?NO  G: Gender - Gender: Male?YES      Total number of \"YES\" responses:      Scoring: Low risk of JO-ANN 0-2  At Risk of JO-ANN: >3 High Risk of JO-ANN: 5-8    Chief Complaint   Patient presents with     Pre-Op Exam       Initial /82 (BP Location: Right arm, Patient Position: Sitting, Cuff Size: Adult Regular)  Pulse 65  Temp 97.7  F (36.5  C)  Ht 5' 6\" (1.676 m)  Wt 206 lb (93.4 kg)  SpO2 97%  BMI 33.25 kg/m2 Estimated body mass index is 33.25 kg/(m^2) as calculated from the following:    Height as of this encounter: 5' 6\" (1.676 m).    Weight as of this encounter: 206 lb (93.4 kg).  Medication Reconciliation: complete    Marisol Beaver LPN      Patient Active Problem List    Diagnosis Date Noted     GERD with esophagitis 08/18/2017     Priority: Medium     Crohn's disease with complication (H) 04/26/2017     Priority: Medium     Osteoarthrosis 04/26/2017     Priority: Medium     Pernicious anemia 04/26/2017     Priority: Medium     ACP (advance care planning) 08/01/2016     Priority: Medium     Advance Care Planning 8/1/2016: ACP Review of Chart / Resources Provided:  Reviewed chart for advance care plan.  Phi Tabares Jr has no plan or code status on file however states presence of ACP document. Copy requested. Confirmed code status reflects current choices pending receipt of document/advance care plan review.  Confirmed/documented legally designated decision makers.  Added by Debby Lyn             Hyperlipidemia 06/15/2015     Priority: Medium     Diagnosis updated by automated process. Provider to review and confirm.       ED (erectile dysfunction) 02/21/2014     Priority: Medium     Advanced care " "planning/counseling discussion 03/28/2012     Priority: Medium     Disorder of bursae and tendons in shoulder region 08/30/2011     Priority: Medium     Problem list name updated by automated process. Provider to review       S/P subtotal colectomy 01/01/2011     Priority: Medium     Greer's esophagus determined by endoscopy 01/01/2011     Priority: Medium     Overview:   Per Dr Scott (GI at [facility])       Osteoporosis 01/01/2011     Priority: Medium     Problem list name updated by automated process. 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 Provider to review       Crohn's colitis 07/30/1969     Priority: Medium       No past medical history on file.    Past Surgical History:   Procedure Laterality Date     ------------OTHER-------------  1980s    colon resection     CATARACT IOL, RT/LT  2017    bilateral removal and lens placed     COLONOSCOPY      09/28/2017,[facility]       Current Outpatient Prescriptions   Medication Sig Dispense Refill     aspirin EC 81 MG EC tablet Take 81 mg by mouth daily with food       atorvastatin (LIPITOR) 10 MG tablet Take 10 mg by mouth daily       azaTHIOprine (IMURAN) 50 MG tablet Take 150 tablets by mouth every morning       BD DISP NEEDLES 22G X 1-1/2\" MISC USE FOR VITAMIN B-12 INJECTIONS EVERY 2 WEEKS 2 each 1     cyanocobalamin (VITAMIN B12) 1000 MCG/ML injection Inject 1 ml IM every 3 weeks. 6 mL 0     Ferrous Sulfate (IRON) 325 (65 Fe) MG tablet Take 325 mg by mouth       mesalamine (LIALDA) 1.2 G EC tablet 3 tablets daily.       NONFORMULARY ascol one tab every day       pantoprazole (PROTONIX) 40 MG EC tablet TAKE AS DIRECTED 180 tablet 1     predniSONE (DELTASONE) 5 MG tablet 1.25 mg daily       Probiotic Product (PROBIOTIC ACIDOPHILUS) CAPS Probiotic & Acidophilus 300 million cell 250 mg Cap       traMADol (ULTRAM) 50 MG tablet Take 1 " tablet (50 mg) by mouth 2 times daily as needed for severe pain 180 tablet 1       Recent use of ibuprofen, aspirin or aleve: Yes     Allergies:  Allergies   Allergen Reactions     Dye [Contrast Dye]      IV Dye, Iodine Containing Contrast Media     Iodine Rash       Latex allergy  No    Family History   Problem Relation Age of Onset     Breast Cancer Mother      Diabetes Father      Thyroid Disease Sister      Diabetes Brother      Thyroid Disease Sister      GASTROINTESTINAL DISEASE Sister      chrons       Denies family hx of bleeding tendencies, anesthesia complications, or other problems with surgery.      Social History     Social History     Marital status:      Spouse name: N/A     Number of children: N/A     Years of education: N/A     Occupational History     Not on file.     Social History Main Topics     Smoking status: Former Smoker     Quit date: 1/1/2013     Smokeless tobacco: Never Used     Alcohol use 0.6 oz/week      Comment: occasionally uses alcohol socially     Drug use: No     Sexual activity: Not on file     Other Topics Concern     Not on file     Social History Narrative         ROS:    surgical:  patient denies previous complications from prior surgeries including but not limited to prolonged bleeding, anesthesia complications, dysrhythmias, surgical wound infections, or prolonged hospital stay.      REVIEW OF SYSTEMS:  A comprehensive review of systems was negative except foritems noted in HPI/Subjective.    Constitutional: Negative for fever, chills and fatigue .   Eyes: Negative for irritation  and redness .  Ears, nose, mouth, throat, and face: Negative for ear drainage, nasal congestion, sore throat and hoarseness .  Respiratory: Negative for cough, sputum production , hemoptysis, dyspnea  and wheezing .  Cardiovascular: Negative for chest discomfort, palpitations and lightheadedness .  Gastrointestinal: Negative for dysphagia, nausea, vomiting, melena, diarrhea, constipation  "and abdominal pain.  Genitourinary: Negative for frequency, dysuria, urinary incontinence, hematuria.  Integument/breast: Negative for rash.   Hematologic/lymphatic: Negative for easy bruising, bleeding, lymphadenopathy and petechiae.   Musculoskeletal: Negative for myalgias and arthralgias .  Neurological: Negative for headaches, dizziness, vertigo, seizures and weakness.   Psychiatric: Negative for anxiety, depression, panic attacks and suicidal ideations.       -------------------------------------------------------------    PHYSICAL EXAM:  /82 (BP Location: Right arm, Patient Position: Sitting, Cuff Size: Adult Regular)  Pulse 65  Temp 97.7  F (36.5  C)  Ht 5' 6\" (1.676 m)  Wt 206 lb (93.4 kg)  SpO2 97%  BMI 33.25 kg/m2    EXAM:  General Appearance: Pleasant, alert, appropriate appearance for age. No acute distress  Head Exam: Normal. Normocephalic, atraumatic.  Eye Exam: Normal external eye, conjunctiva, lids, cornea. JACOB.  Ear Exam: Normal TM's bilaterally. Normal auditory canals and external ears. Non-tender.  Nose Exam: Normal external nose, mucus membranes, and septum.  OroPharynx Exam: Dental hygiene adequate. Normal buccal mucosa. Normal pharynx.  Neck Exam: Supple, no masses or nodes., no lymphadenopathy  Thyroid Exam: Normal., No nodules or enlargement., normal to palpation  Chest/Respiratory Exam: Normal chest wall and respirations. Clear to auscultation.  Cardiovascular Exam: Regular rate and rhythm. S1, S2, no murmur, click, gallop, or rubs.  Gastrointestinal Exam: Soft, nontender, no abnormal masses or organomegaly. BS x 4.  Lymphatic Exam: Normal.  Musculoskeletal Exam: Back is straight and non-tender, full ROM of upper and lower extremities.  Skin: no rash or abnormalities  Neurologic Exam: symmetric DTRs, normal gross motor movement, tone, and coordination. No tremor.  Psychiatric Exam: Alert and oriented, appropriate affect.    PHQ Depression Screen  PHQ-9 SCORE 6/15/2015 8/1/2016 "   Total Score 0 -   Total Score - 0       Patient can walk up a flight of stairs without becoming short of breath or having chest pain: YES   Patient is able to tolerate greater than 4 METs of activity without any cardiopulmonary symptoms.    LABS:    Results for orders placed or performed in visit on 09/21/18   Comprehensive Metabolic Panel   Result Value Ref Range    Sodium 137 134 - 144 mmol/L    Potassium 4.3 3.5 - 5.1 mmol/L    Chloride 107 98 - 107 mmol/L    Carbon Dioxide 23 21 - 31 mmol/L    Anion Gap 7 3 - 14 mmol/L    Glucose 98 70 - 105 mg/dL    Urea Nitrogen 16 7 - 25 mg/dL    Creatinine 0.90 0.70 - 1.30 mg/dL    GFR Estimate 85 >60 mL/min/1.7m2    GFR Estimate If Black >90 >60 mL/min/1.7m2    Calcium 9.4 8.6 - 10.3 mg/dL    Bilirubin Total 0.7 0.3 - 1.0 mg/dL    Albumin 4.3 3.5 - 5.7 g/dL    Protein Total 6.8 6.4 - 8.9 g/dL    Alkaline Phosphatase 68 34 - 104 U/L    ALT 38 7 - 52 U/L    AST 32 13 - 39 U/L      7/31/2018  Component Results   Component Value Flag Ref Range Units Status Collected Lab   WBC 6.2  4.0 - 11.0 10e9/L Final 07/31/2018  1:50 PM [facility]   RBC Count 4.99  4.4 - 5.9 10e12/L Final 07/31/2018  1:50 PM [facility]   Hemoglobin 16.7  13.3 - 17.7 g/dL Final 07/31/2018  1:50 PM [facility]   Hematocrit 48.1  40.0 - 53.0 % Final 07/31/2018  1:50 PM [facility]   MCV 96  78 - 100 fl Final 07/31/2018  1:50 PM [facility]   MCH 33.5 (H) 26.5 - 33.0 pg Final 07/31/2018  1:50 PM [facility]   MCHC 34.7  31.5 - 36.5 g/dL Final 07/31/2018  1:50 PM [facility]   RDW 14.2  10.0 - 15.0 % Final 07/31/2018  1:50 PM [facility]   Platelet Count 168  150 - 450 10e9/L Final 07/31/2018  1:50 PM [facility]   Diff Method     Final 07/31/2018  1:50 PM [facility]   Automated Method   % Neutrophils 76.7   % Final 07/31/2018  1:50 PM [facility]   % Lymphocytes 9.0   % Final 07/31/2018  1:50 PM [facility]   % Monocytes 10.9   % Final 07/31/2018  1:50 PM [facility]   % Eosinophils 2.6   % Final 07/31/2018  1:50 PM  [facility]   % Basophils 0.3   % Final 07/31/2018  1:50 PM [facility]   % Immature Granulocytes 0.5   % Final 07/31/2018  1:50 PM [facility]   Absolute Neutrophil 4.8  1.6 - 8.3 10e9/L Final 07/31/2018  1:50 PM [facility]   Absolute Lymphocytes 0.6 (L) 0.8 - 5.3 10e9/L Final 07/31/2018  1:50 PM [facility]   Absolute Monocytes 0.7  0.0 - 1.3 10e9/L Final 07/31/2018  1:50 PM [facility]   Absolute Eosinophils 0.2  0.0 - 0.7 10e9/L Final 07/31/2018  1:50 PM [facility]   Absolute Basophils 0.0  0.0 - 0.2 10e9/L Final 07/31/2018  1:50 PM [facility]   Abs Immature Granulocytes 0.0  0 - 0.4 10e9/L Final 07/31/2018  1:50 PM [facility]         CXR:  Not necessary    EKG: sinus bradycardia, normal axis, normal intervals, no acute ST/T changes c/w ischemia, no LVH by voltage criteria, unchanged from previous tracings, final read pending by internal medicine    ---------------------------------------------------------------    No family history of problems with bleeding or anesthetia.    ASA PS class 3. Patient's perioperative risk is minimized and no further cardiopulmonary workup is neccesary.  Please contact the office with any questions or concerns.    1. Pre-op exam    2. Incomplete tear of right rotator cuff    3. Hyperlipidemia, unspecified hyperlipidemia type    4. Gastroesophageal reflux disease, esophagitis presence not specified          ASSESSEMNT AND PLAN:  1.  Preoperative history and physical   consults:  None  Patient is approved for the surgery.  No concerns.     For above listed surgery and anesthesia:    - Patient is Medium risk for perioperative complications.    Patient was administered the following vaccines today: none.  Completed labs today: CMP, no concerns.  Patient had an unremarkable CBC on 7/31/2018.    PRE OP RECOMMENDATIONS:  Patient is on chronic pain medications no   Patient is on antiplatlet/anticoagulation YES -patient was encouraged to discontinue his aspirin 7 days prior to surgery.  Other  medications that need adjustment perioperatively no     Patient Instructions   Patient should take the following medications the morning of surgery with a small sip of water: imuran, mesalamine.  Patient was instructed to hold the following medications the morning of surgery: hold all meds.     Please continue taking your prednisone prior to surgery.     Patient was advised to call our office and the surgical services with any change in condition or new symptoms if they were to develop between today and their surgical date.  Especially any cardiopulmonary symptoms or symptoms concerning for an infection.     Discontinue aspirin, aleve, naproxen and ibuprofen 7 days prior to surgery to reduce bleeding risk.  It is fine to take tylenol the week before surgery.  Hold vitamins and herbal remedies for 7 days before surgery.      Patient should continue taking his prednisone prior to surgery in order to reduce complications with a possible Crohn's flair and adrenal concerns.     Other:  Patient was advised to call our office and the surgical services with any change in condition or new symptoms if they were to develop between today and their surgical date.  Especially any cardiopulmonary symptoms or symptoms concerning for an infection.     PRE OP RECOMMENDATIONS:  Discontinue ASA 7 days prior to reduce bleeding risk and Discontinue NSAIDS 7 days prior to procedure to reduce bleeding risk    Greater than 25 minutes were spent in counseling and coordination of care.    Jazzmine Lanza PA-C..................9/21/2018 1:43 PM

## 2018-09-21 NOTE — MR AVS SNAPSHOT
After Visit Summary   9/21/2018    Phi Tabares    MRN: [id number]           Patient Information     Date Of Birth          1950        Visit Information        Provider Department      9/21/2018 1:30 PM Jazzmine Lanza PA-C [facility]        Today's Diagnoses     Pre-op exam    -  1    Incomplete tear of right rotator cuff        Hyperlipidemia, unspecified hyperlipidemia type        Gastroesophageal reflux disease, esophagitis presence not specified          Care Instructions    Patient should take the following medications the morning of surgery with a small sip of water: imuran, mesalamine.  Patient was instructed to hold the following medications the morning of surgery: hold all meds.     Please continue taking your prednisone prior to surgery.     Patient was advised to call our office and the surgical services with any change in condition or new symptoms if they were to develop between today and their surgical date.  Especially any cardiopulmonary symptoms or symptoms concerning for an infection.     Discontinue aspirin, aleve, naproxen and ibuprofen 7 days prior to surgery to reduce bleeding risk.  It is fine to take tylenol the week before surgery.  Hold vitamins and herbal remedies for 7 days before surgery.            Follow-ups after your visit        Follow-up notes from your care team     Return if symptoms worsen or fail to improve.      Future tests that were ordered for you today     Open Future Orders        Priority Expected Expires Ordered    CBC and Differential Routine  9/21/2019 9/21/2018    Comprehensive Metabolic Panel Routine  9/21/2019 9/21/2018            Who to contact     If you have questions or need follow up information about today's clinic visit or your schedule please contact [facility] directly at [phone].  Normal or non-critical lab and imaging results will be communicated to you by MyChart, letter or phone  "within 4 business days after the clinic has received the results. If you do not hear from us within 7 days, please contact the clinic through [email or web address] or phone. If you have a critical or abnormal lab result, we will notify you by phone as soon as possible.  Submit refill requests through [employer] or call your pharmacy and they will forward the refill request to us. Please allow 3 business days for your refill to be completed.          Additional Information About Your Visit        Care EveryWhere ID     This is your Care EveryWhere ID. This could be used by other organizations to access your [facility] [place] medical records  [id number]        Your Vitals Were     Pulse Temperature Height Pulse Oximetry BMI (Body Mass Index)       65 97.7  F (36.5  C) 5' 6\" (1.676 m) 97% 33.25 kg/m2        Blood Pressure from Last 3 Encounters:   09/21/18 133/82   07/31/18 136/82   01/24/18 120/80    Weight from Last 3 Encounters:   09/21/18 206 lb (93.4 kg)   07/31/18 207 lb (93.9 kg)   01/24/18 213 lb (96.6 kg)              We Performed the Following     EKG 12-LEAD CLINIC READ        Primary Care Provider Office Phone # Fax #    Gaston Bucio [phone]       [address] [facility] [address]        Equal Access to Services     [facility] REJI : Hadii aad ku hadasho Soomaali, waaxda luqadaha, qaybta kaalmada adeegyada, polly riggs. So [facility] [phone].    ATENCIÓN: Si habla español, tiene a vazquez disposición servicios gratuitos de asistencia lingüística. Llame al [phone].    We comply with applicable federal civil rights laws and [place] laws. We do not discriminate on the basis of race, color, national origin, age, disability, sex, sexual orientation, or gender identity.            Thank you!     Thank you for choosing [facility]  for your care. Our goal is always to provide you with excellent care. Hearing back from our patients is one way we can " "continue to improve our services. Please take a few minutes to complete the written survey that you may receive in the mail after your visit with us. Thank you!             Your Updated Medication List - Protect others around you: Learn how to safely use, store and throw away your medicines at www.disposemymeds.org.          This list is accurate as of 9/21/18  2:12 PM.  Always use your most recent med list.                   Brand Name Dispense Instructions for use Diagnosis    aspirin 81 MG EC tablet      Take 81 mg by mouth daily with food        atorvastatin 10 MG tablet    LIPITOR     Take 10 mg by mouth daily        azaTHIOprine 50 MG tablet    IMURAN     Take 150 tablets by mouth every morning        BD DISP NEEDLES 22G X 1-1/2\" Misc   Generic drug:  Needle (Disp)     2 each    USE FOR VITAMIN B-12 INJECTIONS EVERY 2 WEEKS    Anemia       cyanocobalamin 1000 MCG/ML injection    VITAMIN B12    6 mL    Inject 1 ml IM every 3 weeks.    Pernicious anemia       iron 325 (65 Fe) MG tablet      Take 325 mg by mouth        mesalamine 1.2 g EC tablet    LIALDA     3 tablets daily.        NONFORMULARY      ascol one tab every day        pantoprazole 40 MG EC tablet    PROTONIX    180 tablet    TAKE AS DIRECTED    Gastroesophageal reflux disease without esophagitis       predniSONE 5 MG tablet    DELTASONE     1.25 mg daily        PROBIOTIC ACIDOPHILUS BIOBEADS Caps      Probiotic & Acidophilus 300 million cell 250 mg Cap        traMADol 50 MG tablet    ULTRAM    180 tablet    Take 1 tablet (50 mg) by mouth 2 times daily as needed for severe pain    Primary osteoarthritis involving multiple joints         "

## 2018-09-21 NOTE — NURSING NOTE
"Date of Surgery: 9/27/18  Type of Surgery: Right shoulder cope and debridement  Surgeon: Dr. Vasquez  Hospital:  [facility]  Fax: [phone]    Fever/Chills or other infectious symptoms in past month: NO  >10lb weight loss in past two months: NO  O2 SAT:     Health Care Directive/Code status:  YEs  Hx of blood transfusions:    NO  Td up to date:  2/20/14  History of VRE/MRSA:  NO Date:    Preoperative Evaluation: Obstructive Sleep Apnea screening    S: Snore -  Do you snore loudly? (louder than talking or loud enough to be heard through closed doors)NO  T: Tired - Do you often feel tired, fatigued, or sleepy during the daytime?NO  O: Observed - Has anyone ever observed you stop breathing during your sleep?NO  P: Pressure - Do you have or are you being treated for high blood pressure?NO  B: BMI - BMI greater than 35kg/m2? NO  A: Age - Age over 50 years old?NO  N: Neck - Neck circumference greater than 40 cm?NO  G: Gender - Gender: Male?YES      Total number of \"YES\" responses:      Scoring: Low risk of JO-ANN 0-2  At Risk of JO-ANN: >3 High Risk of JO-ANN: 5-8    Chief Complaint   Patient presents with     Pre-Op Exam       Initial /82 (BP Location: Right arm, Patient Position: Sitting, Cuff Size: Adult Regular)  Pulse 65  Temp 97.7  F (36.5  C)  Ht 5' 6\" (1.676 m)  Wt 206 lb (93.4 kg)  SpO2 97%  BMI 33.25 kg/m2 Estimated body mass index is 33.25 kg/(m^2) as calculated from the following:    Height as of this encounter: 5' 6\" (1.676 m).    Weight as of this encounter: 206 lb (93.4 kg).  Medication Reconciliation: complete    Marisol Beaver LPN    "

## 2018-09-21 NOTE — PATIENT INSTRUCTIONS
Patient should take the following medications the morning of surgery with a small sip of water: imuran, mesalamine.  Patient was instructed to hold the following medications the morning of surgery: hold all meds.     Please continue taking your prednisone prior to surgery.     Patient was advised to call our office and the surgical services with any change in condition or new symptoms if they were to develop between today and their surgical date.  Especially any cardiopulmonary symptoms or symptoms concerning for an infection.     Discontinue aspirin, aleve, naproxen and ibuprofen 7 days prior to surgery to reduce bleeding risk.  It is fine to take tylenol the week before surgery.  Hold vitamins and herbal remedies for 7 days before surgery.

## 2018-09-21 NOTE — LETTER
Phi Rayshawn  [address] [facility] [address]    9/21/2018      Dear Mr. Tabares,      We've received the results back from the laboratory for the samples you gave in clinic.  Your labs are normal. Please contact us at [phone] with any questions or concerns that you have.    I attached your lab results for your records.        Take Care,         Jazzmine Lanza PA-C    Resulted Orders   Comprehensive Metabolic Panel   Result Value Ref Range    Sodium 137 134 - 144 mmol/L    Potassium 4.3 3.5 - 5.1 mmol/L    Chloride 107 98 - 107 mmol/L    Carbon Dioxide 23 21 - 31 mmol/L    Anion Gap 7 3 - 14 mmol/L    Glucose 98 70 - 105 mg/dL    Urea Nitrogen 16 7 - 25 mg/dL    Creatinine 0.90 0.70 - 1.30 mg/dL    GFR Estimate 85 >60 mL/min/1.7m2    GFR Estimate If Black >90 >60 mL/min/1.7m2    Calcium 9.4 8.6 - 10.3 mg/dL    Bilirubin Total 0.7 0.3 - 1.0 mg/dL    Albumin 4.3 3.5 - 5.7 g/dL    Protein Total 6.8 6.4 - 8.9 g/dL    Alkaline Phosphatase 68 34 - 104 U/L    ALT 38 7 - 52 U/L    AST 32 13 - 39 U/L

## 2018-10-08 DIAGNOSIS — Z91.89 FRAMINGHAM CARDIAC RISK 10-20% IN NEXT 10 YEARS: Primary | ICD-10-CM

## 2018-10-11 RX ORDER — ATORVASTATIN CALCIUM 10 MG/1
TABLET, FILM COATED ORAL
Qty: 90 TABLET | Refills: 3 | Status: SHIPPED | OUTPATIENT
Start: 2018-10-11 | End: 2019-08-22

## 2018-10-11 NOTE — TELEPHONE ENCOUNTER
Jenny COOPER sent Rx request for the following:     ATORVASTATIN 10MG TABLETS  TAKE 1 TABLET BY MOUTH EVERY DAY.  Last Written Prescription Date:  1/4/18  Last Fill Quantity: 90,   # refills: 2    Last Office Visit: 9/21/18 (Jazzmine Lanza, Pre-op)  Future Office visit: None.    Per LOV Note:  Patient has history of hyperlipidemia.  Currently well controlled with atorvastatin 10 mg.  No side effects noted with medication.    Prescription approved per [facility] Refill Protocol for 90 days and 3 refills at this time. Susie Chaudhary RN .............. 10/11/2018  11:13 AM

## 2018-10-19 DIAGNOSIS — D51.0 PERNICIOUS ANEMIA: ICD-10-CM

## 2018-10-23 DIAGNOSIS — K21.9 GASTROESOPHAGEAL REFLUX DISEASE WITHOUT ESOPHAGITIS: ICD-10-CM

## 2018-10-23 RX ORDER — PANTOPRAZOLE SODIUM 40 MG/1
TABLET, DELAYED RELEASE ORAL
Qty: 180 TABLET | Refills: 0 | Status: SHIPPED | OUTPATIENT
Start: 2018-10-23 | End: 2019-01-18

## 2018-10-23 RX ORDER — CYANOCOBALAMIN 1000 UG/ML
INJECTION, SOLUTION INTRAMUSCULAR; SUBCUTANEOUS
Qty: 6 ML | Refills: 0 | Status: SHIPPED | OUTPATIENT
Start: 2018-10-23 | End: 2019-03-11

## 2018-10-23 NOTE — TELEPHONE ENCOUNTER
"Noted that refill request was approved:    pantoprazole (PROTONIX) 40 MG EC tablet 180 tablet 0 10/23/2018  No   Sig: TAKE 1 TABLET BY MOUTH TWICE DAILY AS NEEDED.   Class: E-Prescribe   Order: [id number]   E-Prescribing Status: Receipt confirmed by pharmacy (10/23/2018  2:16 PM CDT)     [facility] [id number] - [place], [place] - [address]     Left generic message, notifying him that his \"request had been authorized\". Susie Chaudhary RN .............. 10/23/2018  2:51 PM    "

## 2018-10-23 NOTE — TELEPHONE ENCOUNTER
Jenny COOPER sent Rx request for the following:     Patient called today, stating they are leaving tomorrow. NEEDS ASAP!    CYANOCOBALAMIN 1000MCG/ML INJ, 1ML  INJECT 1 ML INTO THE MUSCLE EVERY 2 WEEKS  Last Written Prescription Date:  4/26/17  Last Fill Quantity: 6 ml,   # refills: 3    Last Office Visit: 9/21/18 (Jazzmine Lanza, Pre-op)  Future Office visit: None.    Routing refill request to provider for review/approval because:    Requested sig not on active med list (Medication tab in chart review), however it is listed as active in [facility] for [facility].    Historically reported on 7/31/18 with sig: Inject 1 ml IM every 3 weeks. Patient requested as doing so every 2 weeks.    Unable to complete prescription refill per RN Medication Refill Policy. Susie Chaudhary RN .............. 10/23/2018  12:49 PM

## 2018-10-23 NOTE — TELEPHONE ENCOUNTER
"Noted that refill request was approved:    cyanocobalamin (VITAMIN B12) 1000 MCG/ML injection 6 mL 0 10/23/2018  No   Sig: INJECT 1 ML INTO THE MUSCLE EVERY 2 WEEKS   Class: E-Prescribe   Order: [id number]   E-Prescribing Status: Receipt confirmed by pharmacy (10/23/2018  2:16 PM CDT)     [facility] [id number] - [place], [place] - [address]     Left generic message, notifying him that his \"request had been authorized\". Susie Chaudhary RN .............. 10/23/2018  2:51 PM    "

## 2018-10-23 NOTE — TELEPHONE ENCOUNTER
Jenny COOPER sent Rx request for the following:   Patient called today, stating they are leaving tomorrow. NEEDS ASAP!    PANTOPRAZOLE 40MG TABLETS  TAKE 1 TABLET BY MOUTH TWICE DAILY AS NEEDED.  Last Written Prescription ordered by Duncan Mehta NP at [facility] in [place]:  pantoprazole (PROTONIX) 40 MG EC tablet 180 tablet 1 2/13/2017  No   Sig: TAKE AS DIRECTED   Class: E-Prescribe   Last Office Visit: 9/21/18 (Jazzmine Lanza, Pre-op)  Future Office visit: None.    Routing refill request to provider for review/approval because:    PPI Protocol Olaisa16/23 12:47 PM   No diagnosis of osteoporosis on record     Last prescribed by provider outside of [facility]    Unable to complete prescription refill per RN Medication Refill Policy. Susie Chaudhary RN .............. 10/23/2018  12:48 PM

## 2019-01-18 DIAGNOSIS — K21.9 GASTROESOPHAGEAL REFLUX DISEASE WITHOUT ESOPHAGITIS: ICD-10-CM

## 2019-01-22 RX ORDER — PANTOPRAZOLE SODIUM 40 MG/1
TABLET, DELAYED RELEASE ORAL
Qty: 180 TABLET | Refills: 1 | Status: SHIPPED | OUTPATIENT
Start: 2019-01-22 | End: 2019-07-26

## 2019-01-22 NOTE — TELEPHONE ENCOUNTER
Jenny COOPER sent Rx request for the following:      PANTOPRAZOLE 40MG TABLETS  Sig: TAKE 1 TABLET BY MOUTH TWICE DAILY AS NEEDED.  Last Written Prescription Date:  10/23/18  Last Fill Quantity: 180,   # refills: 0    Last Office Visit: 9/21/18 (Jazzmine Lanza, Pre-op)  Future Office visit: None.    Routing refill request to provider for review/approval because:  PPI Protocol Failed1/22 9:01 AM   No diagnosis of osteoporosis on record     Unable to complete prescription refill per RN Medication Refill Policy. Susie Chaudhary RN .............. 1/22/2019  9:10 AM

## 2019-02-02 DIAGNOSIS — M15.0 PRIMARY OSTEOARTHRITIS INVOLVING MULTIPLE JOINTS: ICD-10-CM

## 2019-02-04 RX ORDER — TRAMADOL HYDROCHLORIDE 50 MG/1
TABLET ORAL
Qty: 180 TABLET | Refills: 0 | Status: SHIPPED | OUTPATIENT
Start: 2019-02-04 | End: 2019-08-22

## 2019-02-04 NOTE — TELEPHONE ENCOUNTER
Tramadol 50 mg      Last Written Prescription Date:  5/17/18  Last Fill Quantity: 180,   # refills: 1  Last Office Visit: 9/21/18  Future Office visit:       Routing refill request to provider for review/approval because:  Drug not on the [facility], [facility] or [facility] refill protocol or controlled substance    Zaynab Vargas RN on 2/4/2019 at 11:52 AM

## 2019-07-08 DIAGNOSIS — Z91.89 FRAMINGHAM CARDIAC RISK 10-20% IN NEXT 10 YEARS: ICD-10-CM

## 2019-07-10 RX ORDER — ATORVASTATIN CALCIUM 10 MG/1
TABLET, FILM COATED ORAL
Qty: 90 TABLET | Refills: 0 | OUTPATIENT
Start: 2019-07-10

## 2019-07-10 NOTE — TELEPHONE ENCOUNTER
Redundant refill request refused: Too soon:     Disp Refills Start End FÁTIMA   atorvastatin (LIPITOR) 10 MG tablet 90 tablet 3 10/11/2018  No   Sig: TAKE 1 TABLET BY MOUTH EVERY DAY.   Sent to pharmacy as: atorvastatin (LIPITOR) 10 MG tablet   Class: E-Prescribe   Order: [id number]   E-Prescribing Status: Receipt confirmed by pharmacy (10/11/2018 11:13 AM CDT)     [facility] [employer] [place] [id number] - [facility] [address]     Unable to complete prescription refill per RN Medication Refill Policy. Susie Chaudhary RN .............. 7/10/2019  4:22 PM

## 2019-08-22 ENCOUNTER — OFFICE VISIT (OUTPATIENT)
Dept: PEDIATRICS | Facility: OTHER | Age: 69
End: 2019-08-22
Attending: INTERNAL MEDICINE
Payer: MEDICARE

## 2019-08-22 VITALS
RESPIRATION RATE: 18 BRPM | SYSTOLIC BLOOD PRESSURE: 126 MMHG | OXYGEN SATURATION: 96 % | DIASTOLIC BLOOD PRESSURE: 80 MMHG | HEART RATE: 60 BPM | BODY MASS INDEX: 31.74 KG/M2 | HEIGHT: 66 IN | WEIGHT: 197.5 LBS | TEMPERATURE: 97.7 F

## 2019-08-22 DIAGNOSIS — Z91.89 FRAMINGHAM CARDIAC RISK 10-20% IN NEXT 10 YEARS: ICD-10-CM

## 2019-08-22 DIAGNOSIS — D50.9 IRON DEFICIENCY ANEMIA, UNSPECIFIED IRON DEFICIENCY ANEMIA TYPE: ICD-10-CM

## 2019-08-22 DIAGNOSIS — Z90.49 S/P PARTIAL RESECTION OF COLON: ICD-10-CM

## 2019-08-22 DIAGNOSIS — Z76.89 ESTABLISHING CARE WITH NEW DOCTOR, ENCOUNTER FOR: Primary | ICD-10-CM

## 2019-08-22 DIAGNOSIS — K22.70 BARRETT'S ESOPHAGUS DETERMINED BY ENDOSCOPY: ICD-10-CM

## 2019-08-22 DIAGNOSIS — Z92.25 PERSONAL HISTORY OF IMMUNOSUPPRESSIVE THERAPY: ICD-10-CM

## 2019-08-22 DIAGNOSIS — K21.9 GASTROESOPHAGEAL REFLUX DISEASE WITHOUT ESOPHAGITIS: ICD-10-CM

## 2019-08-22 DIAGNOSIS — E78.2 MIXED HYPERLIPIDEMIA: ICD-10-CM

## 2019-08-22 DIAGNOSIS — M15.0 PRIMARY OSTEOARTHRITIS INVOLVING MULTIPLE JOINTS: ICD-10-CM

## 2019-08-22 DIAGNOSIS — Z87.891 PERSONAL HISTORY OF TOBACCO USE, PRESENTING HAZARDS TO HEALTH: ICD-10-CM

## 2019-08-22 DIAGNOSIS — D51.0 PERNICIOUS ANEMIA: Chronic | ICD-10-CM

## 2019-08-22 DIAGNOSIS — Z77.090 EXPOSURE TO ASBESTOS: ICD-10-CM

## 2019-08-22 DIAGNOSIS — K50.819 CROHN'S DISEASE OF SMALL AND LARGE INTESTINES WITH COMPLICATION (H): ICD-10-CM

## 2019-08-22 LAB
ALBUMIN SERPL-MCNC: 4.2 G/DL (ref 3.5–5.7)
ALP SERPL-CCNC: 84 U/L (ref 34–104)
ALT SERPL W P-5'-P-CCNC: 28 U/L (ref 7–52)
ANION GAP SERPL CALCULATED.3IONS-SCNC: 6 MMOL/L (ref 3–14)
AST SERPL W P-5'-P-CCNC: 29 U/L (ref 13–39)
BILIRUB SERPL-MCNC: 0.8 MG/DL (ref 0.3–1)
BUN SERPL-MCNC: 19 MG/DL (ref 7–25)
CALCIUM SERPL-MCNC: 9.2 MG/DL (ref 8.6–10.3)
CHLORIDE SERPL-SCNC: 106 MMOL/L (ref 98–107)
CHOLEST SERPL-MCNC: 149 MG/DL
CO2 SERPL-SCNC: 26 MMOL/L (ref 21–31)
CREAT SERPL-MCNC: 0.85 MG/DL (ref 0.7–1.3)
ERYTHROCYTE [DISTWIDTH] IN BLOOD BY AUTOMATED COUNT: 14.4 % (ref 10–15)
GFR SERPL CREATININE-BSD FRML MDRD: >90 ML/MIN/{1.73_M2}
GLUCOSE SERPL-MCNC: 92 MG/DL (ref 70–105)
HCT VFR BLD AUTO: 47.6 % (ref 40–53)
HDLC SERPL-MCNC: 46 MG/DL (ref 23–92)
HGB BLD-MCNC: 16.3 G/DL (ref 13.3–17.7)
IRON SATN MFR SERPL: 39 % (ref 20–55)
IRON SERPL-MCNC: 144 UG/DL (ref 50–212)
LDLC SERPL CALC-MCNC: 78 MG/DL
MCH RBC QN AUTO: 32.9 PG (ref 26.5–33)
MCHC RBC AUTO-ENTMCNC: 34.2 G/DL (ref 31.5–36.5)
MCV RBC AUTO: 96 FL (ref 78–100)
NONHDLC SERPL-MCNC: 103 MG/DL
PLATELET # BLD AUTO: 147 10E9/L (ref 150–450)
POTASSIUM SERPL-SCNC: 4.3 MMOL/L (ref 3.5–5.1)
PROT SERPL-MCNC: 6.6 G/DL (ref 6.4–8.9)
RBC # BLD AUTO: 4.96 10E12/L (ref 4.4–5.9)
SODIUM SERPL-SCNC: 138 MMOL/L (ref 134–144)
TIBC SERPL-MCNC: 366.8 UG/DL (ref 245–400)
TRIGL SERPL-MCNC: 124 MG/DL
UIBC (UNSATURATED): 222.8 MG/DL
VIT B12 SERPL-MCNC: >1500 PG/ML (ref 180–914)
WBC # BLD AUTO: 4.7 10E9/L (ref 4–11)

## 2019-08-22 PROCEDURE — G0463 HOSPITAL OUTPT CLINIC VISIT: HCPCS | Mod: 25

## 2019-08-22 PROCEDURE — 90732 PPSV23 VACC 2 YRS+ SUBQ/IM: CPT

## 2019-08-22 PROCEDURE — 36415 COLL VENOUS BLD VENIPUNCTURE: CPT | Mod: ZL | Performed by: INTERNAL MEDICINE

## 2019-08-22 PROCEDURE — 99214 OFFICE O/P EST MOD 30 MIN: CPT | Performed by: INTERNAL MEDICINE

## 2019-08-22 PROCEDURE — 83540 ASSAY OF IRON: CPT | Mod: ZL | Performed by: INTERNAL MEDICINE

## 2019-08-22 PROCEDURE — 96372 THER/PROPH/DIAG INJ SC/IM: CPT

## 2019-08-22 PROCEDURE — 80053 COMPREHEN METABOLIC PANEL: CPT | Mod: ZL | Performed by: INTERNAL MEDICINE

## 2019-08-22 PROCEDURE — 80061 LIPID PANEL: CPT | Mod: ZL | Performed by: INTERNAL MEDICINE

## 2019-08-22 PROCEDURE — 82607 VITAMIN B-12: CPT | Mod: ZL | Performed by: INTERNAL MEDICINE

## 2019-08-22 PROCEDURE — 85027 COMPLETE CBC AUTOMATED: CPT | Mod: ZL | Performed by: INTERNAL MEDICINE

## 2019-08-22 PROCEDURE — 83550 IRON BINDING TEST: CPT | Mod: ZL | Performed by: INTERNAL MEDICINE

## 2019-08-22 PROCEDURE — G0009 ADMIN PNEUMOCOCCAL VACCINE: HCPCS

## 2019-08-22 PROCEDURE — 25000128 H RX IP 250 OP 636: Performed by: INTERNAL MEDICINE

## 2019-08-22 RX ORDER — CYANOCOBALAMIN 1000 UG/ML
1000 INJECTION, SOLUTION INTRAMUSCULAR; SUBCUTANEOUS ONCE
Status: COMPLETED | OUTPATIENT
Start: 2019-08-22 | End: 2019-08-22

## 2019-08-22 RX ORDER — PNV NO.95/FERROUS FUM/FOLIC AC 28MG-0.8MG
1 TABLET ORAL
Qty: 90 TABLET | Refills: 3 | Status: SHIPPED | OUTPATIENT
Start: 2019-08-22 | End: 2020-08-17

## 2019-08-22 RX ORDER — CYANOCOBALAMIN 1000 UG/ML
INJECTION, SOLUTION INTRAMUSCULAR; SUBCUTANEOUS
Qty: 6 ML | Refills: 11 | Status: SHIPPED | OUTPATIENT
Start: 2019-08-22 | End: 2020-04-02

## 2019-08-22 RX ORDER — BENZONATATE 100 MG/1
100 CAPSULE ORAL 3 TIMES DAILY PRN
Qty: 30 CAPSULE | Refills: 0 | Status: SHIPPED | OUTPATIENT
Start: 2019-08-22 | End: 2021-01-01

## 2019-08-22 RX ORDER — PANTOPRAZOLE SODIUM 40 MG/1
40 TABLET, DELAYED RELEASE ORAL 2 TIMES DAILY
Qty: 180 TABLET | Refills: 3 | Status: SHIPPED | OUTPATIENT
Start: 2019-08-22 | End: 2020-11-02

## 2019-08-22 RX ORDER — ATORVASTATIN CALCIUM 10 MG/1
10 TABLET, FILM COATED ORAL DAILY
Qty: 90 TABLET | Refills: 3 | Status: SHIPPED | OUTPATIENT
Start: 2019-08-22 | End: 2020-09-28

## 2019-08-22 RX ADMIN — CYANOCOBALAMIN 1000 MCG: 1000 INJECTION, SOLUTION INTRAMUSCULAR at 15:30

## 2019-08-22 ASSESSMENT — MIFFLIN-ST. JEOR: SCORE: 1622.57

## 2019-08-22 NOTE — PATIENT INSTRUCTIONS
-- Work on 5% weight loss   -- Consider PT for back pain   -- Stop tramadol   -- If symptoms are not stable at 4-6 weeks, we could consider restarting tramadol     -- Tessalon for cough   -- If you get worse, return to consider COPD exacerbation   -- Consider breathing test (PFT)     -- Pneumonia 23 shot today   -- Get the shingles shots, at the pharmacy or nurse-only appointment    Your BMI is Body mass index is 31.64 kg/m .  (BMI ranges: Normal 18.5 - 25, Overweight 25 - 30, Obesity greater than 30,     Morbid Obesity greater than 40 or greater than 35 with associated conditions.)    Facts about losing weight:   -- Overweight and Obesity increase your risk for developing diabetes, high blood pressure and stroke, and shorten your life.   -- 90% of weight loss comes from dietary changes, only 10% from exercise    What should I do?   -- Work on 5-10% weight loss   -- Focus on a few healthy dietary changes   -- Eat more fresh fruits and vegetables, and fewer carbohydrates   -- Cut out all calorie-containing beverages (milk, juice, alcohol, etc)   -- Exercise every day   -- Weigh yourself once a week   -- Consider the DASH Diet (http://http://bit.ly/DASHDiet - redirects to the [facility])   -- Consider Weight Watchers (http://www.weightwatchInnovative Acquisitions.com)   -- Consider My Fitness Pal (iOS, Android, http://www.[employer].com)

## 2019-08-22 NOTE — LETTER
August 22, 2019      Phi Tabares  [address] [facility] [address]        Dear ,    We are writing to inform you of your test results.    Looks good.    Signed, Perez Adamson MD  Internal Medicine & Pediatrics      Resulted Orders   Iron Binding Panel GH   Result Value Ref Range    Iron 144 50 - 212 ug/dL    UIBC (Unsaturated) 222.80 mg/dL    Iron Binding Capacity 366.80 245.00 - 400.00 ug/dL    Iron Saturation 39 20 - 55 %   Lipid Profile   Result Value Ref Range    Cholesterol 149 <200 mg/dL    Triglycerides 124 <150 mg/dL    HDL Cholesterol 46 23 - 92 mg/dL    LDL Cholesterol Calculated 78 <100 mg/dL      Comment:      Desirable:       <100 mg/dl    Non HDL Cholesterol 103 <130 mg/dL   Vitamin B12   Result Value Ref Range    Vitamin B12 >1,500 (H) 180 - 914 pg/mL   CBC with platelets   Result Value Ref Range    WBC 4.7 4.0 - 11.0 10e9/L    RBC Count 4.96 4.4 - 5.9 10e12/L    Hemoglobin 16.3 13.3 - 17.7 g/dL    Hematocrit 47.6 40.0 - 53.0 %    MCV 96 78 - 100 fl    MCH 32.9 26.5 - 33.0 pg    MCHC 34.2 31.5 - 36.5 g/dL    RDW 14.4 10.0 - 15.0 %    Platelet Count 147 (L) 150 - 450 10e9/L   Comprehensive metabolic panel   Result Value Ref Range    Sodium 138 134 - 144 mmol/L    Potassium 4.3 3.5 - 5.1 mmol/L    Chloride 106 98 - 107 mmol/L    Carbon Dioxide 26 21 - 31 mmol/L    Anion Gap 6 3 - 14 mmol/L    Glucose 92 70 - 105 mg/dL    Urea Nitrogen 19 7 - 25 mg/dL    Creatinine 0.85 0.70 - 1.30 mg/dL    GFR Estimate >90 >60 mL/min/[1.73_m2]    GFR Estimate If Black >90 >60 mL/min/[1.73_m2]    Calcium 9.2 8.6 - 10.3 mg/dL    Bilirubin Total 0.8 0.3 - 1.0 mg/dL    Albumin 4.2 3.5 - 5.7 g/dL    Protein Total 6.6 6.4 - 8.9 g/dL    Alkaline Phosphatase 84 34 - 104 U/L    ALT 28 7 - 52 U/L    AST 29 13 - 39 U/L       If you have any questions or concerns, please call the clinic at the number listed above.       Sincerely,        Perez Adamson MD

## 2019-08-22 NOTE — NURSING NOTE
Patient presents to clinic for establish care appointment with medication management.    Medication Reconciliation: complete    Susie Durbin LPN

## 2019-08-22 NOTE — PROGRESS NOTES
Subjective  Phi Tabares is a 68 year old male who presents for [facility] primary care.  Previous physician was Dr. Bucio.  Over the last couple of weeks he is been coughing.  Seems to be worse at night.  He is bringing up phlegm.  No wheezing.  Tactile fevers are present.  Has been sweating.  No dyspnea.  No chest pain.  No sinus pain or pressure.  No postnasal drainage.  No known sick contacts.  He has a tobacco use history but quit a while ago.  He has a history of Crohn's disease and Greer's esophagus and follows with gastroenterology.  His Crohn's disease has been stable with azathioprine and mesalamine.  History of hyperlipidemia which is stable on Lipitor.  History of B12 deficiency which is stable on cyanocobalamin.  History of iron deficiency which is stable with iron supplementation.  History of Greer's esophagus as mentioned and continues on Protonix.  He has chronic osteoarthritis pain particularly in the low back and continues on tramadol.  He usually combines it with aspirin and ibuprofen and it takes care of his pain.  No chest pains with exertion.  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    Problem List/PMH: reviewed in EMR, and made relevant updates today.  Medications: reviewed in EMR, and made relevant updates today.  Allergies: reviewed in EMR, and made relevant updates today.    Social Hx:  Social History     Tobacco Use     Smoking status: Former Smoker     Packs/day: 1.00     Years: 25.00     Pack years: 25.00     Last attempt to quit: 2013     Years since quittin.6     Smokeless tobacco: Never Used   Substance Use Topics     Alcohol use: Yes     Alcohol/week: 0.6 oz     Comment: occasionally uses alcohol socially     Drug use: No     Social History     Social History Narrative    Retired from mines     I reviewed social history and made relevant updates today.    Family Hx:   Family History   Problem Relation Age of Onset     Breast Cancer Mother      Diabetes Father   "    Thyroid Disease Sister      Diabetes Brother      Thyroid Disease Sister      Gastrointestinal Disease Sister         chrons       Objective  Vitals: reviewed in EMR.  /80 (BP Location: Right arm, Patient Position: Sitting, Cuff Size: Adult Large)   Pulse 60   Temp 97.7  F (36.5  C) (Tympanic)   Resp 18   Ht 1.683 m (5' 6.25\")   Wt 89.6 kg (197 lb 8 oz)   SpO2 96%   BMI 31.64 kg/m      Gen: Pleasant male, NAD.  HEENT: MMM, no OP erythema.   Neck: Supple, no JVD, no bruits.  CV: RRR no m/r/g.   Pulm: CTAB no w/r/r  Neuro: Grossly intact  Msk: No lower extremity edema.  Skin: No concerning lesions.  Psychiatric: Normal affect and insight. Does not appear anxious or depressed.    Labs:  Lab Results   Component Value Date    WBC 4.7 08/22/2019    HGB 16.3 08/22/2019    HCT 47.6 08/22/2019     (L) 08/22/2019    CHOL 149 08/22/2019    TRIG 124 08/22/2019    HDL 46 08/22/2019    ALT 28 08/22/2019    AST 29 08/22/2019     08/22/2019    BUN 19 08/22/2019    CO2 26 08/22/2019    TSH 1.28 08/01/2016    PSA 0.78 08/01/2016     Labs:  Glucose   Date Value Ref Range Status   08/22/2019 92 70 - 105 mg/dL Final   09/21/2018 98 70 - 105 mg/dL Final     No results found for: A1C  Creatinine   Date Value Ref Range Status   08/22/2019 0.85 0.70 - 1.30 mg/dL Final   09/21/2018 0.90 0.70 - 1.30 mg/dL Final     LDL Cholesterol Calculated   Date Value Ref Range Status   08/22/2019 78 <100 mg/dL Final     Comment:     Desirable:       <100 mg/dl             Assessment    ICD-10-CM    1. Establishing care with new doctor, encounter for Z76.89    2. Crohn's disease of small and large intestines with complication (H) K50.819 Comprehensive metabolic panel     CBC with platelets     Ferrous Sulfate (IRON) 325 (65 Fe) MG tablet     Iron Binding Panel GH     Iron Binding Panel GH     CBC with platelets     Comprehensive metabolic panel   3. S/P partial resection of colon Z90.49    4. Greer's esophagus determined by " endoscopy K22.70    5. Personal history of immunosuppressive therapy Z92.25    6. [place] cardiac risk 10-20% in next 10 years Z91.89 atorvastatin (LIPITOR) 10 MG tablet   7. Primary osteoarthritis involving multiple joints M15.0    8. Gastroesophageal reflux disease without esophagitis K21.9 pantoprazole (PROTONIX) 40 MG EC tablet     CBC with platelets     CBC with platelets   9. Pernicious anemia D51.0 cyanocobalamin (CYANOCOBALAMIN) 1000 MCG/ML injection     CBC with platelets     Vitamin B12     Iron Binding Panel GH     cyanocobalamin injection 1,000 mcg     Iron Binding Panel GH     Vitamin B12     CBC with platelets   10. Mixed hyperlipidemia E78.2 Lipid Profile     Lipid Profile   11. Iron deficiency anemia, unspecified iron deficiency anemia type D50.9 Iron Binding Panel GH     Iron Binding Panel GH   12. Personal history of tobacco use, presenting hazards to health Z87.891 benzonatate (TESSALON) 100 MG capsule   13. Exposure to asbestos Z77.090      Orders Placed This Encounter   Procedures     Pneumococcal vaccine 23 valent PPSV23  (Pneumovax) [38924]     Comprehensive metabolic panel     CBC with platelets     Vitamin B12     Lipid Profile     Iron Binding Panel GH       We had a lengthy discussion today with regards to the use of narcotics and chronic pain.  I recommended discontinuing the tramadol if able as it can increase mortality risk and is not been shown to be effective for chronic nonmalignant pain.  He is in agreement and willing to try it.  I think he likely has underlying COPD with exacerbation.  We discussed the possibility of treatment with antibiotic and prednisone and he declined.  I recommended pulmonary function testing and he declined.    Plan   -- Expected clinical course discussed   -- Medications and their side effects discussed  Patient Instructions    -- Work on 5% weight loss   -- Consider PT for back pain   -- Stop tramadol   -- If symptoms are not stable at 4-6 weeks, we  could consider restarting tramadol     -- Tessalon for cough   -- If you get worse, return to consider COPD exacerbation   -- Consider breathing test (PFT)     -- Pneumonia 23 shot today   -- Get the shingles shots, at the pharmacy or nurse-only appointment    Your BMI is Body mass index is 31.64 kg/m .  (BMI ranges: Normal 18.5 - 25, Overweight 25 - 30, Obesity greater than 30,     Morbid Obesity greater than 40 or greater than 35 with associated conditions.)    Facts about losing weight:   -- Overweight and Obesity increase your risk for developing diabetes, high blood pressure and stroke, and shorten your life.   -- 90% of weight loss comes from dietary changes, only 10% from exercise    What should I do?   -- Work on 5-10% weight loss   -- Focus on a few healthy dietary changes   -- Eat more fresh fruits and vegetables, and fewer carbohydrates   -- Cut out all calorie-containing beverages (milk, juice, alcohol, etc)   -- Exercise every day   -- Weigh yourself once a week   -- Consider the DASH Diet (http://http://bit.ly/DASHDiet - redirects to the [facility])   -- Consider Weight Watchers (http://www.weightwatchers.com)   -- Consider My Fitness Pal (iOS, Android, http://www.[employer])              Signed, Perez Adamson MD  Internal Medicine & Pediatrics

## 2019-10-11 DIAGNOSIS — Z91.89 FRAMINGHAM CARDIAC RISK 10-20% IN NEXT 10 YEARS: ICD-10-CM

## 2019-10-11 RX ORDER — ATORVASTATIN CALCIUM 10 MG/1
TABLET, FILM COATED ORAL
Qty: 90 TABLET | Refills: 0 | OUTPATIENT
Start: 2019-10-11

## 2020-01-01 ENCOUNTER — HEALTH MAINTENANCE LETTER (OUTPATIENT)
Age: 70
End: 2020-01-01

## 2020-01-01 DIAGNOSIS — D51.0 PERNICIOUS ANEMIA: Chronic | ICD-10-CM

## 2020-01-01 RX ORDER — CYANOCOBALAMIN 1000 UG/ML
INJECTION, SOLUTION INTRAMUSCULAR; SUBCUTANEOUS
Qty: 4 ML | Refills: 1 | Status: SHIPPED | OUTPATIENT
Start: 2020-01-01 | End: 2021-01-01

## 2020-02-11 ENCOUNTER — TRANSFERRED RECORDS (OUTPATIENT)
Dept: HEALTH INFORMATION MANAGEMENT | Facility: OTHER | Age: 70
End: 2020-02-11

## 2020-02-11 LAB
ALT SERPL-CCNC: 39 U/L (ref 18–65)
AST SERPL-CCNC: 51 U/L (ref 10–40)
CREAT SERPL-MCNC: 0.76 MG/DL (ref 0.8–1.5)
GLUCOSE SERPL-MCNC: 90 MG/DL (ref 60–99)
HEP C HIM: NORMAL
POTASSIUM SERPL-SCNC: 4.5 MEQ/L (ref 3.5–5.1)

## 2020-02-21 DIAGNOSIS — D69.6 THROMBOCYTOPENIA (H): ICD-10-CM

## 2020-02-21 DIAGNOSIS — K50.90 CROHN'S DISEASE WITHOUT COMPLICATION, UNSPECIFIED GASTROINTESTINAL TRACT LOCATION (H): Primary | ICD-10-CM

## 2020-02-21 DIAGNOSIS — R74.8 ABNORMAL LEVELS OF OTHER SERUM ENZYMES: ICD-10-CM

## 2020-03-06 ENCOUNTER — HOSPITAL ENCOUNTER (OUTPATIENT)
Dept: ULTRASOUND IMAGING | Facility: OTHER | Age: 70
Discharge: HOME | End: 2020-03-06
Attending: PHYSICIAN ASSISTANT | Admitting: FAMILY MEDICINE
Payer: MEDICARE

## 2020-03-06 DIAGNOSIS — R74.8 ABNORMAL LEVELS OF OTHER SERUM ENZYMES: ICD-10-CM

## 2020-03-06 DIAGNOSIS — K50.90 CROHN'S DISEASE (H): ICD-10-CM

## 2020-03-06 PROCEDURE — 76705 ECHO EXAM OF ABDOMEN: CPT

## 2020-04-02 DIAGNOSIS — D51.0 PERNICIOUS ANEMIA: Chronic | ICD-10-CM

## 2020-04-02 RX ORDER — CYANOCOBALAMIN 1000 UG/ML
INJECTION, SOLUTION INTRAMUSCULAR; SUBCUTANEOUS
Qty: 4 ML | Refills: 1 | Status: SHIPPED | OUTPATIENT
Start: 2020-04-02 | End: 2020-01-01

## 2020-04-02 NOTE — TELEPHONE ENCOUNTER
Refill Request for: Cyanocobalamin (CYANOCOBALAMIN) 1000 MCG/ML injection    Received From: [facility]  Last Written Prescription Date: 8/22/2019 for 6 mL and 11 refills  LOV: 8/22/2019 with PCP  Next Appointment: No upcoming office visit on file during initial refill review with PCP  Protocol: Vitamin Supplements (Adult) Protocol Passed - 04/02/2020 03:23 PM    Prescription approved per [facility] Medication Refill Protocol.      Dianne STEVENN, RN on 4/2/2020 at 3:27 PM

## 2020-04-09 ENCOUNTER — TRANSFERRED RECORDS (OUTPATIENT)
Dept: HEALTH INFORMATION MANAGEMENT | Facility: OTHER | Age: 70
End: 2020-04-09

## 2020-04-20 ENCOUNTER — TRANSFERRED RECORDS (OUTPATIENT)
Dept: HEALTH INFORMATION MANAGEMENT | Facility: OTHER | Age: 70
End: 2020-04-20

## 2020-06-02 ENCOUNTER — OFFICE VISIT (OUTPATIENT)
Dept: SURGERY | Facility: OTHER | Age: 70
End: 2020-06-02
Attending: SURGERY
Payer: MEDICARE

## 2020-06-02 VITALS
BODY MASS INDEX: 30.76 KG/M2 | HEIGHT: 67 IN | HEART RATE: 66 BPM | TEMPERATURE: 98.5 F | WEIGHT: 196 LBS | SYSTOLIC BLOOD PRESSURE: 120 MMHG | RESPIRATION RATE: 18 BRPM | DIASTOLIC BLOOD PRESSURE: 72 MMHG

## 2020-06-02 DIAGNOSIS — R05.9 COUGH: Primary | ICD-10-CM

## 2020-06-02 PROCEDURE — G0463 HOSPITAL OUTPT CLINIC VISIT: HCPCS

## 2020-06-02 PROCEDURE — 99203 OFFICE O/P NEW LOW 30 MIN: CPT | Performed by: SURGERY

## 2020-06-02 PROCEDURE — G0463 HOSPITAL OUTPT CLINIC VISIT: HCPCS | Mod: 25

## 2020-06-02 RX ORDER — BENZONATATE 100 MG/1
100 CAPSULE ORAL 3 TIMES DAILY PRN
Qty: 90 CAPSULE | Refills: 4 | Status: SHIPPED | OUTPATIENT
Start: 2020-06-02 | End: 2020-06-23

## 2020-06-02 ASSESSMENT — MIFFLIN-ST. JEOR: SCORE: 1622.68

## 2020-06-02 ASSESSMENT — PAIN SCALES - GENERAL: PAINLEVEL: NO PAIN (0)

## 2020-06-02 NOTE — NURSING NOTE
"Chief Complaint   Patient presents with     Consult     upper right abdominal pain that radiates around the sides and sometimes into back       Initial /72 (BP Location: Right arm, Patient Position: Sitting, Cuff Size: Adult Large)   Pulse 66   Temp 98.5  F (36.9  C) (Tympanic)   Resp 18   Ht 1.702 m (5' 7\")   Wt 88.9 kg (196 lb)   BMI 30.70 kg/m   Estimated body mass index is 30.7 kg/m  as calculated from the following:    Height as of this encounter: 1.702 m (5' 7\").    Weight as of this encounter: 88.9 kg (196 lb).  Medication Reconciliation: complete    Winnie Fernandez LPN  "

## 2020-06-02 NOTE — PROGRESS NOTES
OFFICE CONSULTATION NOTE  Patient Name: Phi Tabares  Address: [address] [facility] [address]  Age:69 year old  Sex: male     Primary Care Physician: Perez Adamson    I was requested to see this patient in consultation by Perez Adamson for evaluation of gallstones. A copy of this note will be sent to Perez Adamson.    HPI:   The patient is 69 year old male with episodes of RUQ pain, nausea and vomiting. These have been going on for about 9 months. The patient was seen in GI and was found to have abnormal LFTs.  An US was ordered and low fat diet was recommended. The pain has since improved.  The bloating has persisted. The patient has been avoiding fatty foods and the pain has been less. When the pain was the worst it radiated into the back and to the right groin. Patient denies diarrhea or light colored stools. Patient hasn't had any dark urine. Patient denies fevers. The US showed stones with no pericholecystic fluid or wall thickening. The common duct was not visualized.     REVIEW OF SYSTEMS  GENERAL: No fevers or chills. Denies fatigue, recent weight loss.  HEENT: No sinus drainage. No changes with vision or hearing. No difficulty swallowing.   LYMPHATICS:  No swollen nodes inaxilla, neck or groin.  CARDIOVASCULAR: Denies chest pain, palpitations and dyspnea on exertion.  PULMONARY: No shortness of breath or cough. No increase in sputum production.  GI: Denies melena, bright red blood instools. No hematemesis. No constipation or diarrhea.  : No dysuria or hematuria.  SKIN: No recent rashes or ulcers.   HEMATOLOGY:  No history of easy bruising or bleeding.  ENDOCRINE:  No history of diabetes orthyroid problems.  NEUROLOGY:  No history of seizures or headaches. No motor or sensory changes.    PAST MEDICAL HISTORY  No past medical history on file.   PAST SURGICAL HISTORY    Past Surgical History:   Procedure Laterality Date     ------------OTHER-------------  1980s     "colon resection     CATARACT IOL, RT/LT  2017    bilateral removal and lens placed     COLONOSCOPY  09/28/2017 09/28/2017, [facility]        CURRENT MEDS  aspirin EC 81 MG EC tablet, Take 81 mg by mouth daily with food  atorvastatin (LIPITOR) 10 MG tablet, Take 1 tablet (10 mg) by mouth daily  azaTHIOprine (IMURAN) 50 MG tablet, Take 100 tablets by mouth every morning  BD DISP NEEDLES 22G X 1-1/2\" MISC, USE FOR VITAMIN B-12 INJECTIONS EVERY 2 WEEKS  benzonatate (TESSALON) 100 MG capsule, Take 1 capsule (100 mg) by mouth 3 times daily as needed for cough  cyanocobalamin (CYANOCOBALAMIN) 1000 MCG/ML injection, INJECT 1 ML INTO THE MUSCLE EVERY 3 WEEKS  Ferrous Sulfate (IRON) 325 (65 Fe) MG tablet, Take 1 tablet by mouth daily (with breakfast)  NONFORMULARY, ascol one tab every day  pantoprazole (PROTONIX) 40 MG EC tablet, Take 1 tablet (40 mg) by mouth 2 times daily  Probiotic Product (PROBIOTIC ACIDOPHILUS) CAPS, Probiotic & Acidophilus 300 million cell 250 mg Cap    No current facility-administered medications on file prior to visit.     ALLERGIES/SENSITIVITIES  Allergies   Allergen Reactions     Dye [Contrast Dye]      IV Dye, Iodine Containing Contrast Media     Iodine Rash     Shrimp Rash     FAMILY HISTORY    Family History   Problem Relation Age of Onset     Breast Cancer Mother      Diabetes Father      Thyroid Disease Sister      Diabetes Brother      Thyroid Disease Sister      Gastrointestinal Disease Sister         chrons       SOCIAL HISTORY  Social History     Socioeconomic History     Marital status:      Spouse name: Not on file     Number of children: Not on file     Years of education: Not on file     Highest education level: Not on file   Occupational History     Not on file   Social Needs     Financial resource strain: Not on file     Food insecurity     Worry: Not on file     Inability: Not on file     Transportation needs     Medical: Not on file     Non-medical: Not on file   Tobacco Use " "    Smoking status: Former Smoker     Packs/day: 1.00     Years: 25.00     Pack years: 25.00     Last attempt to quit: 2013     Years since quittin.4     Smokeless tobacco: Never Used   Substance and Sexual Activity     Alcohol use: Yes     Alcohol/week: 1.0 standard drinks     Comment: occasionally uses alcohol socially     Drug use: No     Sexual activity: Yes     Partners: Female   Lifestyle     Physical activity     Days per week: Not on file     Minutes per session: Not on file     Stress: Not on file   Relationships     Social connections     Talks on phone: Not on file     Gets together: Not on file     Attends Holiness service: Not on file     Active member of club or organization: Not on file     Attends meetings of clubs or organizations: Not on file     Relationship status: Not on file     Intimate partner violence     Fear of current or ex partner: Not on file     Emotionally abused: Not on file     Physically abused: Not on file     Forced sexual activity: Not on file   Other Topics Concern     Parent/sibling w/ CABG, MI or angioplasty before 65F 55M? Not Asked   Social History Narrative    Retired from mines     The above history was reviewed 2020.  PHYSICAL EXAM  /72 (BP Location: Right arm, Patient Position: Sitting, Cuff Size: Adult Large)   Pulse 66   Temp 98.5  F (36.9  C) (Tympanic)   Resp 18   Ht 1.702 m (5' 7\")   Wt 88.9 kg (196 lb)   BMI 30.70 kg/m      GENERAL: Healthy appearing patient in noacute distress. Pleasant and cooperative with exam and interview.   HEENT: Head-normocephalic. Eyes-no scleral icterus, pupils equal, round, and reactive to light. Nose-no nasal drainage. No lesions. Mouth-oral mucosapink and moist, no lesions.  NECK: Supple. No thyroid nodules. Trachea midline.  LYMPHATICS:  No cervical, axillary or supraclavicular adenopathy.  CV: Regular rate and rhythm, no murmurs. No peripheral edema.  LUNGS:  No respiratory distress. Clear bilaterally to " auscultation.  ABDOMEN: protruberant. Bowel sounds active. Soft, non-tender, no hepatosplenomegaly or hernias. No peritoneal signs.  SKIN: Pink, warm and dry. Nojaundice. No rash.  NEURO:  Cranial nerves II-XII grossly intact. Alert and oriented.  PSYCH: Appropriate mood and affect.      IMAGING/LAB  I personally reviewed patient's US    CONSULTATION ASSESSMENT AND PLAN/RECOMMENDATIONS:   I discussed with the patient the pathophysiology of gallbladder disease and gallstones with the patient. We specifically discussed the treament of symptomatic cholelithiasis with laproscopic cholecystectomy. I explained the risks, benefits and alternatives to surgical treatment including the specific risks of infection, bleeding, injury to the common bile duct and other abdominal organs, post cholecystectomy diarrhea, post op bile leak, and continued abdominal pain. We discussed the possible need for open surgery.     The patient expressed understanding and wishes to proceed. Informed consent paperwork was completed.    This will be scheduled at a time that is convenient for the patient. The patient will call with questions or concerns prior to the surgery. The patient denies questions at this time.    - Will proceed with MRCP prior to surgery due to abnormal LFT's and inflammatory bowel disease history.       Ludwig Bella MD on 6/2/2020 at 3:30 PM

## 2020-06-04 ENCOUNTER — APPOINTMENT (OUTPATIENT)
Dept: PEDIATRICS | Facility: OTHER | Age: 70
End: 2020-06-04
Attending: INTERNAL MEDICINE
Payer: MEDICARE

## 2020-06-11 ENCOUNTER — HOSPITAL ENCOUNTER (OUTPATIENT)
Dept: MRI IMAGING | Facility: OTHER | Age: 70
Discharge: HOME | End: 2020-06-11
Attending: SURGERY | Admitting: SURGERY
Payer: MEDICARE

## 2020-06-11 DIAGNOSIS — R05.9 COUGH: ICD-10-CM

## 2020-06-11 PROCEDURE — 74183 MRI ABD W/O CNTR FLWD CNTR: CPT

## 2020-06-11 PROCEDURE — 25500064 ZZH RX 255 OP 636: Performed by: SURGERY

## 2020-06-11 PROCEDURE — A9575 INJ GADOTERATE MEGLUMI 0.1ML: HCPCS | Performed by: SURGERY

## 2020-06-11 RX ADMIN — GADOTERATE MEGLUMINE 20 ML: 376.9 INJECTION INTRAVENOUS at 10:09

## 2020-06-23 ENCOUNTER — OFFICE VISIT (OUTPATIENT)
Dept: PEDIATRICS | Facility: OTHER | Age: 70
End: 2020-06-23
Attending: INTERNAL MEDICINE
Payer: MEDICARE

## 2020-06-23 VITALS
RESPIRATION RATE: 18 BRPM | HEART RATE: 67 BPM | DIASTOLIC BLOOD PRESSURE: 78 MMHG | SYSTOLIC BLOOD PRESSURE: 120 MMHG | WEIGHT: 193.7 LBS | TEMPERATURE: 98.4 F | BODY MASS INDEX: 30.4 KG/M2 | HEIGHT: 67 IN | OXYGEN SATURATION: 95 %

## 2020-06-23 DIAGNOSIS — Z87.891 HISTORY OF TOBACCO USE: ICD-10-CM

## 2020-06-23 DIAGNOSIS — K50.919 CROHN'S DISEASE WITH COMPLICATION, UNSPECIFIED GASTROINTESTINAL TRACT LOCATION (H): Chronic | ICD-10-CM

## 2020-06-23 DIAGNOSIS — K80.50 CHOLEDOCHOLITHIASIS: Primary | ICD-10-CM

## 2020-06-23 DIAGNOSIS — Z92.25 PERSONAL HISTORY OF IMMUNOSUPPRESSIVE THERAPY: Chronic | ICD-10-CM

## 2020-06-23 PROCEDURE — 99214 OFFICE O/P EST MOD 30 MIN: CPT | Performed by: INTERNAL MEDICINE

## 2020-06-23 PROCEDURE — G0463 HOSPITAL OUTPT CLINIC VISIT: HCPCS

## 2020-06-23 ASSESSMENT — PAIN SCALES - GENERAL: PAINLEVEL: NO PAIN (0)

## 2020-06-23 ASSESSMENT — MIFFLIN-ST. JEOR: SCORE: 1612.25

## 2020-06-23 NOTE — NURSING NOTE
"Chief Complaint   Patient presents with     Follow Up     f/u on consultation with Dr. Ludwig Bella      Results     MRI abdomen results      Patient is here for a follow up from his consultation with Dr. Ludwig Bella. Patient states Dr. Bella wanted an MRI done. It was done but patient has not heard any results.     Initial /78 (BP Location: Right arm, Patient Position: Sitting, Cuff Size: Adult Regular)   Pulse 67   Temp 98.4  F (36.9  C) (Tympanic)   Resp 18   Ht 1.702 m (5' 7\")   Wt 87.9 kg (193 lb 11.2 oz)   SpO2 95%   BMI 30.34 kg/m   Estimated body mass index is 30.34 kg/m  as calculated from the following:    Height as of this encounter: 1.702 m (5' 7\").    Weight as of this encounter: 87.9 kg (193 lb 11.2 oz).  Medication Reconciliation: complete    Adriana Eubanks MA  "

## 2020-06-24 NOTE — PROGRESS NOTES
Subjective  Phi Tabares is a 69 year old male who presents for discuss possible hepatitis screening.  He was referred to Dr. Bella for cholecystectomy.  Dr. Bella was concerned that he may have an indication for a liver biopsy and requested that I see him for further evaluation prior to surgery.  Phi has been having diffuse abdominal pain intermittently for the last year.  It is associated with decreased appetite.  If he eats too much he cannot sleep due to the pain.  He has more symptoms with greasy or fatty foods.  He has had some coughing at nighttime as well sometimes associated with nausea.  His cough has improved with Tessalon Perles.  He typically has a couple of loose stools a day but lately it is been increased in frequency to 5/day.  No red or black stools.  No abdominal cramps.  He has a history of Crohn's disease and commonly has looser stools.  He cut down on azathioprine himself and is unsure if this helped or not.  He was initially seen at [facility] by gastroenterology.  He tells me that they were the ones who referred him for cholecystectomy.  They offered to have it performed in [place] but he wanted to have it performed locally.  He had a liver biopsy many years ago which revealed scar tissue.  He does not think anything has changed.    Problem List/PMH: reviewed in EMR, and made relevant updates today.  Medications: reviewed in EMR, and made relevant updates today.  Allergies: reviewed in EMR, and made relevant updates today.    Social Hx:  Social History     Tobacco Use     Smoking status: Former Smoker     Packs/day: 1.00     Years: 25.00     Pack years: 25.00     Last attempt to quit: 2008     Years since quittin.4     Smokeless tobacco: Never Used   Substance Use Topics     Alcohol use: Yes     Alcohol/week: 1.0 standard drinks     Comment: occasionally uses alcohol socially     Drug use: No     Social History     Social History Narrative    Retired from mines     I reviewed  "social history and made relevant updates today.    Family Hx:   Family History   Problem Relation Age of Onset     Breast Cancer Mother      Diabetes Father      Thyroid Disease Sister      Diabetes Brother      Thyroid Disease Sister      Gastrointestinal Disease Sister         chrons       Objective  Vitals: reviewed in EMR.  /78 (BP Location: Right arm, Patient Position: Sitting, Cuff Size: Adult Regular)   Pulse 67   Temp 98.4  F (36.9  C) (Tympanic)   Resp 18   Ht 1.702 m (5' 7\")   Wt 87.9 kg (193 lb 11.2 oz)   SpO2 95%   BMI 30.34 kg/m      Gen: Pleasant male, NAD.  HEENT: MMM, no OP erythema.   Neck: Supple, no JVD, no bruits.  CV: RRR no m/r/g.   Pulm: No rhonchi.  Scattered wheezing is present.  GI: Soft, NT, ND. No HSM. No masses. Bowel sounds present.  Neuro: Grossly intact  Msk: No lower extremity edema.  Skin: No concerning lesions.  Psychiatric: Normal affect and insight. Does not appear anxious or depressed.    Labs:  Lab Results   Component Value Date    WBC 4.7 08/22/2019    HGB 16.3 08/22/2019    HCT 47.6 08/22/2019     (L) 08/22/2019    CHOL 149 08/22/2019    TRIG 124 08/22/2019    HDL 46 08/22/2019    ALT 28 08/22/2019    AST 29 08/22/2019     08/22/2019    BUN 19 08/22/2019    CO2 26 08/22/2019    TSH 1.28 08/01/2016    PSA 0.78 08/01/2016       Glucose   Date Value Ref Range Status   08/22/2019 92 70 - 105 mg/dL Final   09/21/2018 98 70 - 105 mg/dL Final     No results found for: A1C  Creatinine   Date Value Ref Range Status   08/22/2019 0.85 0.70 - 1.30 mg/dL Final   09/21/2018 0.90 0.70 - 1.30 mg/dL Final     LDL Cholesterol Calculated   Date Value Ref Range Status   08/22/2019 78 <100 mg/dL Final     Comment:     Desirable:       <100 mg/dl     No results found for: GHTSH      Outside lab reviewed from [employer], see media tab.  February 14, 2020.  CRP normal.  Albumin normal.  Hepatitis B and C-.  Alk phos slightly elevated at 161.  ALT normal, AST slightly " elevated at 51.    Recent Results (from the past 744 hour(s))   MR Abdomen MRCP w/o & w Contrast    Narrative    MR ABDOMEN MRCP W/O & W CONTRAST, 6/11/2020 10:07 AM    CLINICAL HISTORY: Male, age 69 years,  Abn liver function tests  (LFTs); Cough;    Comparison:  None.    TECHNIQUE: Axial T1 pre-and post-IV contrast, dynamic imaging; T1 in  and out of phase; axial and coronal T2 and T2 FLAIR.Coronal T2,  gradient echo; axial T2, T2 fat saturation, T1 in and out of phase and  MRCP with 3-D reconstructions..    FINDINGS:  Lung Bases:  Grossly normal.    Esophagus/stomach: Normal.    Liver:  There is capsular retraction in the right lobe that  demonstrates increased T2 signal and slight decrease in signal on the  T1-weighted images. Contrast-enhanced images demonstrate delayed  enhancement suggesting confluent hepatic fibrosis. There are a few  mildly prominent vessels in this area on the arterial phase  contrast-enhanced images without evidence of well-defined  hypervascular lesion.    Portal venous system: Grossly normal.  Gallbladder: Grossly normal.    Spleen: Spleen is enlarged.    Pancreas: Normal.    Adrenal Glands: Normal.    Kidneys: Tiny cortical cysts of the kidneys.  Ureters: Visualized portions are normal.    Abdominal Aorta: No evidence of aneurysm.  IVC: Visualized portions are normal.    Lymph Nodes: Normal.  Urinary bladder: Not included in this examination.  Large and Small Bowel: The visualized portions are normal.  Pelvic Organs:  Not included in the examination.  Bony structures: Normal.    MRCP:  Intrahepatic biliary dilatation, least evident in segment 6 and  7. No evidence of extra hepatic dilatation. No evidence of filling  defect within the common duct. No evidence of pancreatic duct  dilatation.      Impression    IMPRESSION:  Capsular retraction scarring of the liver, likely related to confluent  hepatic fibrosis with resultant intrahepatic biliary dilatation, least  evident in segment 6  and 7. There is slight heterogeneity in  enhancement in the subcapsular aspects of the scar without distinct  evidence that would suggest hepatocellular carcinoma or  cholangiocarcinoma at this time.    Tiny cortical cysts of the kidneys.    Splenic lesion.    SEPIDEH HAND MD         Assessment    ICD-10-CM    1. Choledocholithiasis  K80.50    2. Crohn's disease with complication, unspecified gastrointestinal tract location (H)  K50.919    3. History of tobacco use  Z87.891    4. Personal history of immunosuppressive therapy  Z92.25      Does appear to have symptoms and imaging consistent with gallbladder associated pain.  He has had a recent extensive evaluation from his gastroenterologist who recommended cholecystectomy.  It sounds as though despite having hepatic fibrosis this is a chronic condition and not new.  I see no indication for a liver biopsy at this time.    I think his cough is likely secondary to COPD.  We discussed the possibility for testing but deferred it for now.  If he continues to require the use of Tessalon Perles would recommend follow-up to discuss inhalers.    Plan   -- Expected clinical course discussed   -- No further testing   -- Recommend proceeding with cholecystectomy from Medicine perspective    Signed, Perez Adamson MD, FAAP, FACP  Internal Medicine & Pediatrics

## 2020-06-24 NOTE — H&P (VIEW-ONLY)
Subjective  Phi Tabares is a 69 year old male who presents for discuss possible hepatitis screening.  He was referred to Dr. Bella for cholecystectomy.  Dr. Bella was concerned that he may have an indication for a liver biopsy and requested that I see him for further evaluation prior to surgery.  Phi has been having diffuse abdominal pain intermittently for the last year.  It is associated with decreased appetite.  If he eats too much he cannot sleep due to the pain.  He has more symptoms with greasy or fatty foods.  He has had some coughing at nighttime as well sometimes associated with nausea.  His cough has improved with Tessalon Perles.  He typically has a couple of loose stools a day but lately it is been increased in frequency to 5/day.  No red or black stools.  No abdominal cramps.  He has a history of Crohn's disease and commonly has looser stools.  He cut down on azathioprine himself and is unsure if this helped or not.  He was initially seen at [facility] by gastroenterology.  He tells me that they were the ones who referred him for cholecystectomy.  They offered to have it performed in [place] but he wanted to have it performed locally.  He had a liver biopsy many years ago which revealed scar tissue.  He does not think anything has changed.    Problem List/PMH: reviewed in EMR, and made relevant updates today.  Medications: reviewed in EMR, and made relevant updates today.  Allergies: reviewed in EMR, and made relevant updates today.    Social Hx:  Social History     Tobacco Use     Smoking status: Former Smoker     Packs/day: 1.00     Years: 25.00     Pack years: 25.00     Last attempt to quit: 2008     Years since quittin.4     Smokeless tobacco: Never Used   Substance Use Topics     Alcohol use: Yes     Alcohol/week: 1.0 standard drinks     Comment: occasionally uses alcohol socially     Drug use: No     Social History     Social History Narrative    Retired from mines     I reviewed  "social history and made relevant updates today.    Family Hx:   Family History   Problem Relation Age of Onset     Breast Cancer Mother      Diabetes Father      Thyroid Disease Sister      Diabetes Brother      Thyroid Disease Sister      Gastrointestinal Disease Sister         chrons       Objective  Vitals: reviewed in EMR.  /78 (BP Location: Right arm, Patient Position: Sitting, Cuff Size: Adult Regular)   Pulse 67   Temp 98.4  F (36.9  C) (Tympanic)   Resp 18   Ht 1.702 m (5' 7\")   Wt 87.9 kg (193 lb 11.2 oz)   SpO2 95%   BMI 30.34 kg/m      Gen: Pleasant male, NAD.  HEENT: MMM, no OP erythema.   Neck: Supple, no JVD, no bruits.  CV: RRR no m/r/g.   Pulm: No rhonchi.  Scattered wheezing is present.  GI: Soft, NT, ND. No HSM. No masses. Bowel sounds present.  Neuro: Grossly intact  Msk: No lower extremity edema.  Skin: No concerning lesions.  Psychiatric: Normal affect and insight. Does not appear anxious or depressed.    Labs:  Lab Results   Component Value Date    WBC 4.7 08/22/2019    HGB 16.3 08/22/2019    HCT 47.6 08/22/2019     (L) 08/22/2019    CHOL 149 08/22/2019    TRIG 124 08/22/2019    HDL 46 08/22/2019    ALT 28 08/22/2019    AST 29 08/22/2019     08/22/2019    BUN 19 08/22/2019    CO2 26 08/22/2019    TSH 1.28 08/01/2016    PSA 0.78 08/01/2016       Glucose   Date Value Ref Range Status   08/22/2019 92 70 - 105 mg/dL Final   09/21/2018 98 70 - 105 mg/dL Final     No results found for: A1C  Creatinine   Date Value Ref Range Status   08/22/2019 0.85 0.70 - 1.30 mg/dL Final   09/21/2018 0.90 0.70 - 1.30 mg/dL Final     LDL Cholesterol Calculated   Date Value Ref Range Status   08/22/2019 78 <100 mg/dL Final     Comment:     Desirable:       <100 mg/dl     No results found for: GHTSH      Outside lab reviewed from [employer], see media tab.  February 14, 2020.  CRP normal.  Albumin normal.  Hepatitis B and C-.  Alk phos slightly elevated at 161.  ALT normal, AST slightly " elevated at 51.    Recent Results (from the past 744 hour(s))   MR Abdomen MRCP w/o & w Contrast    Narrative    MR ABDOMEN MRCP W/O & W CONTRAST, 6/11/2020 10:07 AM    CLINICAL HISTORY: Male, age 69 years,  Abn liver function tests  (LFTs); Cough;    Comparison:  None.    TECHNIQUE: Axial T1 pre-and post-IV contrast, dynamic imaging; T1 in  and out of phase; axial and coronal T2 and T2 FLAIR.Coronal T2,  gradient echo; axial T2, T2 fat saturation, T1 in and out of phase and  MRCP with 3-D reconstructions..    FINDINGS:  Lung Bases:  Grossly normal.    Esophagus/stomach: Normal.    Liver:  There is capsular retraction in the right lobe that  demonstrates increased T2 signal and slight decrease in signal on the  T1-weighted images. Contrast-enhanced images demonstrate delayed  enhancement suggesting confluent hepatic fibrosis. There are a few  mildly prominent vessels in this area on the arterial phase  contrast-enhanced images without evidence of well-defined  hypervascular lesion.    Portal venous system: Grossly normal.  Gallbladder: Grossly normal.    Spleen: Spleen is enlarged.    Pancreas: Normal.    Adrenal Glands: Normal.    Kidneys: Tiny cortical cysts of the kidneys.  Ureters: Visualized portions are normal.    Abdominal Aorta: No evidence of aneurysm.  IVC: Visualized portions are normal.    Lymph Nodes: Normal.  Urinary bladder: Not included in this examination.  Large and Small Bowel: The visualized portions are normal.  Pelvic Organs:  Not included in the examination.  Bony structures: Normal.    MRCP:  Intrahepatic biliary dilatation, least evident in segment 6 and  7. No evidence of extra hepatic dilatation. No evidence of filling  defect within the common duct. No evidence of pancreatic duct  dilatation.      Impression    IMPRESSION:  Capsular retraction scarring of the liver, likely related to confluent  hepatic fibrosis with resultant intrahepatic biliary dilatation, least  evident in segment 6  and 7. There is slight heterogeneity in  enhancement in the subcapsular aspects of the scar without distinct  evidence that would suggest hepatocellular carcinoma or  cholangiocarcinoma at this time.    Tiny cortical cysts of the kidneys.    Splenic lesion.    SEPIDEH HAND MD         Assessment    ICD-10-CM    1. Choledocholithiasis  K80.50    2. Crohn's disease with complication, unspecified gastrointestinal tract location (H)  K50.919    3. History of tobacco use  Z87.891    4. Personal history of immunosuppressive therapy  Z92.25      Does appear to have symptoms and imaging consistent with gallbladder associated pain.  He has had a recent extensive evaluation from his gastroenterologist who recommended cholecystectomy.  It sounds as though despite having hepatic fibrosis this is a chronic condition and not new.  I see no indication for a liver biopsy at this time.    I think his cough is likely secondary to COPD.  We discussed the possibility for testing but deferred it for now.  If he continues to require the use of Tessalon Perles would recommend follow-up to discuss inhalers.    Plan   -- Expected clinical course discussed   -- No further testing   -- Recommend proceeding with cholecystectomy from Medicine perspective    Signed, Perez Adamson MD, FAAP, FACP  Internal Medicine & Pediatrics

## 2020-06-30 ENCOUNTER — TELEPHONE (OUTPATIENT)
Dept: SURGERY | Facility: OTHER | Age: 70
End: 2020-06-30

## 2020-06-30 DIAGNOSIS — Z01.818 PRE-OP TESTING: Primary | ICD-10-CM

## 2020-06-30 NOTE — TELEPHONE ENCOUNTER
Patient called to schedule surgery with Ludwig Bella MD.  Surgery scheduled for laparoscopic Cholecystectomy on 7/6/2020.   PAC appointment not scheduled due to no appointments being available.  Post op appointment scheduled on7/21/2020 at 9:20. Covid appointment scheduled for 7/3/2020 at 9:10AM.  Reviewed surgery folder with patient.  Patient will  folder and soap at unit 4 window.  All patient's questions were answered.

## 2020-07-02 RX ORDER — MULTIVIT-MIN/IRON/FOLIC ACID/K 18-600-40
1 CAPSULE ORAL DAILY
COMMUNITY
End: 2021-01-01

## 2020-07-03 ENCOUNTER — ANESTHESIA EVENT (OUTPATIENT)
Dept: SURGERY | Facility: OTHER | Age: 70
End: 2020-07-03
Payer: MEDICARE

## 2020-07-03 ENCOUNTER — ALLIED HEALTH/NURSE VISIT (OUTPATIENT)
Dept: FAMILY MEDICINE | Facility: OTHER | Age: 70
End: 2020-07-03
Attending: SURGERY
Payer: MEDICARE

## 2020-07-03 DIAGNOSIS — Z01.818 PRE-OP TESTING: ICD-10-CM

## 2020-07-03 PROCEDURE — C9803 HOPD COVID-19 SPEC COLLECT: HCPCS

## 2020-07-03 PROCEDURE — U0003 INFECTIOUS AGENT DETECTION BY NUCLEIC ACID (DNA OR RNA); SEVERE ACUTE RESPIRATORY SYNDROME CORONAVIRUS 2 (SARS-COV-2) (CORONAVIRUS DISEASE [COVID-19]), AMPLIFIED PROBE TECHNIQUE, MAKING USE OF HIGH THROUGHPUT TECHNOLOGIES AS DESCRIBED BY CMS-2020-01-R: HCPCS | Mod: ZL | Performed by: SURGERY

## 2020-07-03 PROCEDURE — 99207 ZZC NO CHARGE NURSE ONLY: CPT

## 2020-07-04 LAB
SARS-COV-2 RNA SPEC QL NAA+PROBE: NOT DETECTED
SPECIMEN SOURCE: NORMAL

## 2020-07-06 ENCOUNTER — ANESTHESIA (OUTPATIENT)
Dept: SURGERY | Facility: OTHER | Age: 70
End: 2020-07-06
Payer: MEDICARE

## 2020-07-06 ENCOUNTER — HOSPITAL ENCOUNTER (OUTPATIENT)
Facility: OTHER | Age: 70
Discharge: HOME | End: 2020-07-06
Attending: SURGERY | Admitting: SURGERY
Payer: MEDICARE

## 2020-07-06 VITALS
HEART RATE: 53 BPM | SYSTOLIC BLOOD PRESSURE: 97 MMHG | RESPIRATION RATE: 13 BRPM | WEIGHT: 193.7 LBS | BODY MASS INDEX: 30.4 KG/M2 | HEIGHT: 67 IN | OXYGEN SATURATION: 93 % | TEMPERATURE: 96.5 F | DIASTOLIC BLOOD PRESSURE: 65 MMHG

## 2020-07-06 DIAGNOSIS — K80.20 CALCULUS OF GALLBLADDER WITHOUT CHOLECYSTITIS WITHOUT OBSTRUCTION: Primary | ICD-10-CM

## 2020-07-06 PROCEDURE — 40000306 ZZH STATISTIC PRE PROC ASSESS II: Performed by: SURGERY

## 2020-07-06 PROCEDURE — 36000058 ZZH SURGERY LEVEL 3 EA 15 ADDTL MIN: Performed by: SURGERY

## 2020-07-06 PROCEDURE — 47562 LAPAROSCOPIC CHOLECYSTECTOMY: CPT | Performed by: NURSE ANESTHETIST, CERTIFIED REGISTERED

## 2020-07-06 PROCEDURE — 37000009 ZZH ANESTHESIA TECHNICAL FEE, EACH ADDTL 15 MIN: Performed by: SURGERY

## 2020-07-06 PROCEDURE — 71000014 ZZH RECOVERY PHASE 1 LEVEL 2 FIRST HR: Performed by: SURGERY

## 2020-07-06 PROCEDURE — 37000008 ZZH ANESTHESIA TECHNICAL FEE, 1ST 30 MIN: Performed by: SURGERY

## 2020-07-06 PROCEDURE — 25000128 H RX IP 250 OP 636: Performed by: SURGERY

## 2020-07-06 PROCEDURE — 25000128 H RX IP 250 OP 636: Performed by: NURSE ANESTHETIST, CERTIFIED REGISTERED

## 2020-07-06 PROCEDURE — 36000056 ZZH SURGERY LEVEL 3 1ST 30 MIN: Performed by: SURGERY

## 2020-07-06 PROCEDURE — 25000132 ZZH RX MED GY IP 250 OP 250 PS 637: Mod: GY | Performed by: SURGERY

## 2020-07-06 PROCEDURE — 25800030 ZZH RX IP 258 OP 636: Performed by: NURSE ANESTHETIST, CERTIFIED REGISTERED

## 2020-07-06 PROCEDURE — 27210794 ZZH OR GENERAL SUPPLY STERILE: Performed by: SURGERY

## 2020-07-06 PROCEDURE — 71000027 ZZH RECOVERY PHASE 2 EACH 15 MINS: Performed by: SURGERY

## 2020-07-06 PROCEDURE — 47562 LAPAROSCOPIC CHOLECYSTECTOMY: CPT | Performed by: SURGERY

## 2020-07-06 PROCEDURE — 25000125 ZZHC RX 250: Performed by: NURSE ANESTHETIST, CERTIFIED REGISTERED

## 2020-07-06 PROCEDURE — 88304 TISSUE EXAM BY PATHOLOGIST: CPT

## 2020-07-06 PROCEDURE — 25000125 ZZHC RX 250: Performed by: SURGERY

## 2020-07-06 PROCEDURE — 25800025 ZZH RX 258: Performed by: SURGERY

## 2020-07-06 PROCEDURE — 71000015 ZZH RECOVERY PHASE 1 LEVEL 2 EA ADDTL HR: Performed by: SURGERY

## 2020-07-06 RX ORDER — LIDOCAINE HYDROCHLORIDE 20 MG/ML
INJECTION, SOLUTION INFILTRATION; PERINEURAL PRN
Status: DISCONTINUED | OUTPATIENT
Start: 2020-07-06 | End: 2020-07-06

## 2020-07-06 RX ORDER — ACETAMINOPHEN 10 MG/ML
INJECTION, SOLUTION INTRAVENOUS PRN
Status: DISCONTINUED | OUTPATIENT
Start: 2020-07-06 | End: 2020-07-06

## 2020-07-06 RX ORDER — PHENYLEPHRINE HYDROCHLORIDE 10 MG/ML
INJECTION INTRAVENOUS PRN
Status: DISCONTINUED | OUTPATIENT
Start: 2020-07-06 | End: 2020-07-06

## 2020-07-06 RX ORDER — KETAMINE HYDROCHLORIDE 10 MG/ML
INJECTION INTRAMUSCULAR; INTRAVENOUS PRN
Status: DISCONTINUED | OUTPATIENT
Start: 2020-07-06 | End: 2020-07-06

## 2020-07-06 RX ORDER — PROPOFOL 10 MG/ML
INJECTION, EMULSION INTRAVENOUS PRN
Status: DISCONTINUED | OUTPATIENT
Start: 2020-07-06 | End: 2020-07-06

## 2020-07-06 RX ORDER — ACETAMINOPHEN 325 MG/1
650 TABLET ORAL EVERY 4 HOURS PRN
Qty: 100 TABLET | Refills: 0 | Status: SHIPPED | OUTPATIENT
Start: 2020-07-06 | End: 2021-01-01

## 2020-07-06 RX ORDER — PHENYLEPHRINE HCL IN 0.9% NACL 1 MG/10 ML
100 SYRINGE (ML) INTRAVENOUS
Status: DISCONTINUED | OUTPATIENT
Start: 2020-07-06 | End: 2020-07-06 | Stop reason: HOSPADM

## 2020-07-06 RX ORDER — LIDOCAINE 40 MG/G
CREAM TOPICAL
Status: DISCONTINUED | OUTPATIENT
Start: 2020-07-06 | End: 2020-07-06 | Stop reason: HOSPADM

## 2020-07-06 RX ORDER — DEXAMETHASONE SODIUM PHOSPHATE 4 MG/ML
INJECTION, SOLUTION INTRA-ARTICULAR; INTRALESIONAL; INTRAMUSCULAR; INTRAVENOUS; SOFT TISSUE PRN
Status: DISCONTINUED | OUTPATIENT
Start: 2020-07-06 | End: 2020-07-06

## 2020-07-06 RX ORDER — OXYCODONE AND ACETAMINOPHEN 5; 325 MG/1; MG/1
1 TABLET ORAL
Status: COMPLETED | OUTPATIENT
Start: 2020-07-06 | End: 2020-07-06

## 2020-07-06 RX ORDER — ONDANSETRON 2 MG/ML
INJECTION INTRAMUSCULAR; INTRAVENOUS PRN
Status: DISCONTINUED | OUTPATIENT
Start: 2020-07-06 | End: 2020-07-06

## 2020-07-06 RX ORDER — PROPOFOL 10 MG/ML
INJECTION, EMULSION INTRAVENOUS CONTINUOUS PRN
Status: DISCONTINUED | OUTPATIENT
Start: 2020-07-06 | End: 2020-07-06

## 2020-07-06 RX ORDER — SODIUM CHLORIDE, SODIUM LACTATE, POTASSIUM CHLORIDE, CALCIUM CHLORIDE 600; 310; 30; 20 MG/100ML; MG/100ML; MG/100ML; MG/100ML
INJECTION, SOLUTION INTRAVENOUS CONTINUOUS
Status: DISCONTINUED | OUTPATIENT
Start: 2020-07-06 | End: 2020-07-06 | Stop reason: HOSPADM

## 2020-07-06 RX ORDER — ONDANSETRON 4 MG/1
4 TABLET, ORALLY DISINTEGRATING ORAL
Status: COMPLETED | OUTPATIENT
Start: 2020-07-06 | End: 2020-07-06

## 2020-07-06 RX ORDER — KETOROLAC TROMETHAMINE 30 MG/ML
INJECTION, SOLUTION INTRAMUSCULAR; INTRAVENOUS PRN
Status: DISCONTINUED | OUTPATIENT
Start: 2020-07-06 | End: 2020-07-06

## 2020-07-06 RX ORDER — BUPIVACAINE HYDROCHLORIDE AND EPINEPHRINE 5; 5 MG/ML; UG/ML
INJECTION, SOLUTION PERINEURAL PRN
Status: DISCONTINUED | OUTPATIENT
Start: 2020-07-06 | End: 2020-07-06 | Stop reason: HOSPADM

## 2020-07-06 RX ORDER — IBUPROFEN 600 MG/1
600 TABLET, FILM COATED ORAL EVERY 6 HOURS PRN
Qty: 30 TABLET | Refills: 0 | Status: SHIPPED | OUTPATIENT
Start: 2020-07-06 | End: 2021-01-01

## 2020-07-06 RX ORDER — CEFAZOLIN SODIUM 2 G/100ML
2 INJECTION, SOLUTION INTRAVENOUS
Status: COMPLETED | OUTPATIENT
Start: 2020-07-06 | End: 2020-07-06

## 2020-07-06 RX ORDER — CEFAZOLIN SODIUM 1 G/3ML
1 INJECTION, POWDER, FOR SOLUTION INTRAMUSCULAR; INTRAVENOUS SEE ADMIN INSTRUCTIONS
Status: DISCONTINUED | OUTPATIENT
Start: 2020-07-06 | End: 2020-07-06 | Stop reason: HOSPADM

## 2020-07-06 RX ORDER — OXYCODONE AND ACETAMINOPHEN 5; 325 MG/1; MG/1
1 TABLET ORAL
Status: DISCONTINUED | OUTPATIENT
Start: 2020-07-06 | End: 2020-07-06

## 2020-07-06 RX ORDER — AMOXICILLIN 250 MG
1-2 CAPSULE ORAL 2 TIMES DAILY
Qty: 30 TABLET | Refills: 0 | Status: SHIPPED | OUTPATIENT
Start: 2020-07-06 | End: 2021-01-01

## 2020-07-06 RX ORDER — OXYCODONE AND ACETAMINOPHEN 5; 325 MG/1; MG/1
1-2 TABLET ORAL EVERY 4 HOURS PRN
Qty: 12 TABLET | Refills: 0 | Status: SHIPPED | OUTPATIENT
Start: 2020-07-06 | End: 2021-01-01

## 2020-07-06 RX ADMIN — ONDANSETRON 4 MG: 4 TABLET, ORALLY DISINTEGRATING ORAL at 14:35

## 2020-07-06 RX ADMIN — SODIUM CHLORIDE, POTASSIUM CHLORIDE, SODIUM LACTATE AND CALCIUM CHLORIDE: 600; 310; 30; 20 INJECTION, SOLUTION INTRAVENOUS at 12:04

## 2020-07-06 RX ADMIN — DEXAMETHASONE SODIUM PHOSPHATE 4 MG: 4 INJECTION, SOLUTION INTRA-ARTICULAR; INTRALESIONAL; INTRAMUSCULAR; INTRAVENOUS; SOFT TISSUE at 11:19

## 2020-07-06 RX ADMIN — CEFAZOLIN SODIUM 2 G: 2 INJECTION, SOLUTION INTRAVENOUS at 11:18

## 2020-07-06 RX ADMIN — Medication 100 MCG: at 13:12

## 2020-07-06 RX ADMIN — PHENYLEPHRINE HYDROCHLORIDE 100 MCG: 10 INJECTION INTRAVENOUS at 12:05

## 2020-07-06 RX ADMIN — ONDANSETRON 4 MG: 2 INJECTION INTRAMUSCULAR; INTRAVENOUS at 11:19

## 2020-07-06 RX ADMIN — LIDOCAINE HYDROCHLORIDE 60 MG: 20 INJECTION, SOLUTION INFILTRATION; PERINEURAL at 11:19

## 2020-07-06 RX ADMIN — MIDAZOLAM 2 MG: 1 INJECTION INTRAMUSCULAR; INTRAVENOUS at 11:19

## 2020-07-06 RX ADMIN — ROCURONIUM BROMIDE 40 MG: 10 INJECTION INTRAVENOUS at 11:19

## 2020-07-06 RX ADMIN — ACETAMINOPHEN 1000 MG: 10 INJECTION, SOLUTION INTRAVENOUS at 11:37

## 2020-07-06 RX ADMIN — ROCURONIUM BROMIDE 10 MG: 10 INJECTION INTRAVENOUS at 11:30

## 2020-07-06 RX ADMIN — PHENYLEPHRINE HYDROCHLORIDE 100 MCG: 10 INJECTION INTRAVENOUS at 12:55

## 2020-07-06 RX ADMIN — OXYCODONE AND ACETAMINOPHEN 1 TABLET: 5; 325 TABLET ORAL at 15:11

## 2020-07-06 RX ADMIN — Medication 50 MG: at 11:19

## 2020-07-06 RX ADMIN — SODIUM CHLORIDE, POTASSIUM CHLORIDE, SODIUM LACTATE AND CALCIUM CHLORIDE: 600; 310; 30; 20 INJECTION, SOLUTION INTRAVENOUS at 12:49

## 2020-07-06 RX ADMIN — SODIUM CHLORIDE 44 MCG: 900 INJECTION, SOLUTION INTRAVENOUS at 11:35

## 2020-07-06 RX ADMIN — ROCURONIUM BROMIDE 10 MG: 10 INJECTION INTRAVENOUS at 12:16

## 2020-07-06 RX ADMIN — PHENYLEPHRINE HYDROCHLORIDE 200 MCG: 10 INJECTION INTRAVENOUS at 12:19

## 2020-07-06 RX ADMIN — PHENYLEPHRINE HYDROCHLORIDE 200 MCG: 10 INJECTION INTRAVENOUS at 12:53

## 2020-07-06 RX ADMIN — KETOROLAC TROMETHAMINE 15 MG: 30 INJECTION, SOLUTION INTRAMUSCULAR at 11:38

## 2020-07-06 RX ADMIN — PROPOFOL 160 MG: 10 INJECTION, EMULSION INTRAVENOUS at 11:19

## 2020-07-06 RX ADMIN — PROPOFOL 200 MCG/KG/MIN: 10 INJECTION, EMULSION INTRAVENOUS at 11:20

## 2020-07-06 RX ADMIN — SODIUM CHLORIDE, POTASSIUM CHLORIDE, SODIUM LACTATE AND CALCIUM CHLORIDE: 600; 310; 30; 20 INJECTION, SOLUTION INTRAVENOUS at 10:52

## 2020-07-06 RX ADMIN — SUGAMMADEX 200 MG: 100 INJECTION, SOLUTION INTRAVENOUS at 12:42

## 2020-07-06 RX ADMIN — PHENYLEPHRINE HYDROCHLORIDE 100 MCG: 10 INJECTION INTRAVENOUS at 12:27

## 2020-07-06 RX ADMIN — PHENYLEPHRINE HYDROCHLORIDE 100 MCG: 10 INJECTION INTRAVENOUS at 12:11

## 2020-07-06 ASSESSMENT — LIFESTYLE VARIABLES: TOBACCO_USE: 1

## 2020-07-06 ASSESSMENT — MIFFLIN-ST. JEOR: SCORE: 1612.37

## 2020-07-06 NOTE — OR NURSING
patient has been discharged to home with wife at 1615 via wheelchair accompanied by RN and wife    Written discharge instructions were provided to patient and wife.  Prescriptions were filled her at [facility].      Patient and adult caring for them verbalize understanding of discharge instructions including no driving until tomorrow and no longer taking narcotic pain medications - no operating mechanical equipment and no making any important decisions.They understand reason for discharge, and necessary follow-up appointments.    Reviewed all instructions with his wife Agatha.  Reinforced the dressing prior to leaving due to drainage.

## 2020-07-06 NOTE — ANESTHESIA PREPROCEDURE EVALUATION
Anesthesia Pre-Procedure Evaluation    Patient: Phi Tabares   MRN: [id number] : 1950          Preoperative Diagnosis: Cholelithiases [K80.20]    Procedure(s):  CHOLECYSTECTOMY, LAPAROSCOPIC    No past medical history on file.  Past Surgical History:   Procedure Laterality Date     ------------OTHER-------------  1980s    colon resection     CATARACT IOL, RT/LT      bilateral removal and lens placed     COLONOSCOPY  2017,[facility]       Anesthesia Evaluation     . Pt has had prior anesthetic.     History of anesthetic complications   - PONV        ROS/MED HX    ENT/Pulmonary:     (+)tobacco use, , . .    Neurologic:  - neg neurologic ROS     Cardiovascular:     (+) Dyslipidemia, ----. : . . . :. .       METS/Exercise Tolerance:  >4 METS   Hematologic:     (+) Anemia, -      Musculoskeletal:   (+) arthritis,  -       GI/Hepatic: Comment: Greer's esophagus     Crohn's disease with complication - S/P partial resection of colon    (+) GERD Asymptomatic on medication, cholecystitis/cholelithiasis,       Renal/Genitourinary:  - ROS Renal section negative       Endo:  - neg endo ROS       Psychiatric:  - neg psychiatric ROS       Infectious Disease:  - neg infectious disease ROS       Malignancy:      - no malignancy   Other:    - neg other ROS                      Physical Exam  Normal systems: cardiovascular and pulmonary    Airway   Mallampati: I  TM distance: >3 FB  Neck ROM: full    Dental   (+) upper dentures and lower dentures    Cardiovascular   Rhythm and rate: regular and normal      Pulmonary    breath sounds clear to auscultation            Lab Results   Component Value Date    WBC 4.7 2019    HGB 16.3 2019    HCT 47.6 2019     (L) 2019    CRP 3.1 (H) 2013     2019    POTASSIUM 4.3 2019    CHLORIDE 106 2019    CO2 26 2019    BUN 19 2019    CR 0.85 2019    GLC 92 2019    JULIUS 9.2 2019     "ALBUMIN 4.2 08/22/2019    PROTTOTAL 6.6 08/22/2019    ALT 28 08/22/2019    AST 29 08/22/2019    ALKPHOS 84 08/22/2019    BILITOTAL 0.8 08/22/2019    TSH 1.28 08/01/2016    TROPN  07/05/2014     <0.04  **Risk Stratification**   0.10 - 0.39   Elevated-may indicate increased                 risk of short term adverse                 cardiac events.      >=0.4      Possible cardiac injury.         Preop Vitals  BP Readings from Last 3 Encounters:   07/06/20 132/87   06/23/20 120/78   06/02/20 120/72    Pulse Readings from Last 3 Encounters:   06/23/20 67   06/02/20 66   08/22/19 60      Resp Readings from Last 3 Encounters:   07/06/20 16   06/23/20 18   06/02/20 18    SpO2 Readings from Last 3 Encounters:   07/06/20 95%   06/23/20 95%   08/22/19 96%      Temp Readings from Last 1 Encounters:   07/06/20 98.3  F (36.8  C) (Tympanic)    Ht Readings from Last 1 Encounters:   07/06/20 1.702 m (5' 7.01\")      Wt Readings from Last 1 Encounters:   07/06/20 87.9 kg (193 lb 11.2 oz)    Estimated body mass index is 30.33 kg/m  as calculated from the following:    Height as of this encounter: 1.702 m (5' 7.01\").    Weight as of this encounter: 87.9 kg (193 lb 11.2 oz).       Anesthesia Plan      History & Physical Review      ASA Status:  2 .    NPO Status:  > 6 hours    Plan for General with Intravenous and Propofol induction. Maintenance will be TIVA.    PONV prophylaxis:  Ondansetron (or other 5HT-3) and Dexamethasone or Solumedrol         Postoperative Care      Consents  Anesthetic plan, risks, benefits and alternatives discussed with:  Patient..                 David Kellerman, APRN CRNA  "

## 2020-07-06 NOTE — BRIEF OP NOTE
[facility]    Brief Operative Note    Pre-operative diagnosis: Cholelithiases [K80.20]  Post-operative diagnosis Chronic calculous cholecystitis, moderate chirrosis with ascities.     Procedure: Procedure(s):  CHOLECYSTECTOMY, LAPAROSCOPIC  Surgeon: Surgeon(s) and Role:     * Ludwig Bella MD - Primary  Anesthesia: General   Estimated blood loss: Less than 100 ml  Drains: Donnie-Nguyen  Specimens:   ID Type Source Tests Collected by Time Destination   A :  Tissue Gallbladder and Contents SURGICAL PATHOLOGY EXAM Ludwig Bella MD 7/6/2020 11:37 AM      Findings:   Enterotomy, serosal tear, dural tear, or laceration secondary to the nature of the procedure, that was recognized and repaired.  Complications: Unintended puncture/laceration of the gallbladder .  Implants: * No implants in log *

## 2020-07-06 NOTE — ANESTHESIA POSTPROCEDURE EVALUATION
Patient: Phi Tabares    Procedure(s):  CHOLECYSTECTOMY, LAPAROSCOPIC    Diagnosis:Cholelithiases [K80.20]  Diagnosis Additional Information: No value filed.    Anesthesia Type:  General    Note:  Anesthesia Post Evaluation    Patient location during evaluation: PACU  Patient participation: Able to fully participate in evaluation  Level of consciousness: awake and alert  Pain management: adequate  Airway patency: patent  Cardiovascular status: acceptable  Respiratory status: acceptable  Hydration status: acceptable  PONV: none     Anesthetic complications: None          Last vitals:  Vitals:    07/06/20 1420 07/06/20 1430 07/06/20 1435   BP:  (!) 80/49 (!) 84/48   Pulse:  52    Resp:      Temp: 96.5  F (35.8  C)     SpO2:  93% 92%         Electronically Signed By: KARLA Murry CRNA  July 6, 2020  2:43 PM

## 2020-07-06 NOTE — ANESTHESIA CARE TRANSFER NOTE
Patient: Phi Tabares    Procedure(s):  CHOLECYSTECTOMY, LAPAROSCOPIC    Diagnosis: Cholelithiases [K80.20]  Diagnosis Additional Information: No value filed.    Anesthesia Type:   General     Note:  Airway :Face Mask  Patient transferred to:PACU  Handoff Report: Identifed the Patient, Identified the Reponsible Provider, Reviewed the pertinent medical history, Discussed the surgical course, Reviewed Intra-OP anesthesia mangement and issues during anesthesia, Set expectations for post-procedure period and Allowed opportunity for questions and acknowledgement of understanding      Vitals: (Last set prior to Anesthesia Care Transfer)    CRNA VITALS  7/6/2020 1225 - 7/6/2020 1259      7/6/2020             Resp Rate (observed):  (!) 2    Resp Rate (set):  10                Electronically Signed By: KARLA Murry CRNA  July 6, 2020  12:59 PM

## 2020-07-06 NOTE — INTERVAL H&P NOTE
I saw and examined Phi Tabares.  I have reviewed the history and physical and find no changes to the patient's medical status or condition with the exceptions noted below.     Ludwig Bella MD   10:31 AM 7/6/2020

## 2020-07-06 NOTE — OR NURSING
PACU Respiratory Event Documentation     1) Episodes of Apnea greater than or equal to 10 seconds: no    2) Bradypnea - less than 8 breaths per minute: no    3) Pain score on 0 to 10 scale: unknown    4) Pain-sedation mismatch (yes or no): no    5) Repeated 02 desaturation less than 90% (yes or no): no    Anesthesia notified? (yes or no): N/A    Any of the above events occuring repeatedly in separate 30 minute intervals may be considered recurrent PACU respiratory events.

## 2020-07-07 NOTE — OP NOTE
Procedure Date: 07/06/2020      PREOPERATIVE DIAGNOSIS:  Cholelithiasis.      POSTOPERATIVE DIAGNOSES:     1.  Chronic calculus cholecystitis.   2.  Moderate cirrhosis with ascites.      PROCEDURES:     1.  Laparoscopic cholecystectomy.   2.  Placement of drain.      SURGEON:  Ludwig Bella MD      ANESTHESIA:  General with local.      ESTIMATED BLOOD LOSS:  Approximately 150 mL.      DRAIN:  Donnie-Nguyen.      SPECIMEN:  Gallbladder.      FINDINGS:  Entry into the gallbladder at 2 portions of the procedure due to difficult dissection.  No other noted complications.      DESCRIPTION OF PROCEDURE:  The patient was taken to the OR and positioned supine on the operating table.  His abdomen was sterilely prepped and draped in the usual manner.  An epigastric Visiport technique was used to enter the abdomen.  Abdomen was entered and inspected for any injury.  No injury was noted upon entry.  We then proceeded to place additional trocars.  Additional trocars were placed to the patient's right of the midline as the patient had significant intra-abdominal adhesions from the previous laparotomy.  With working around the adhesions, we were able to get additional trocars in place.  A large distended loop of small bowel was noted to be in the right upper quadrant.  This was easily manipulated, but persistently in the way.  We then proceeded to attempt to mobilize the gallbladder.  The liver was dense and woody in a cirrhotic texture.  There was both macronodular and micronodular cirrhosis.  We incised the peritoneum over the gallbladder as it was not clear where the infundibulum would be located.  We then grabbed the fundus and held it on traction.  This was able to elevate the liver, but not deliver the infundibulum of the gallbladder.  Serosa was peeled down off the gallbladder.  A long, floppy, chronically inflamed gallbladder was noted.  Due to the woody nature of the liver and the intrahepatic nature of the gallbladder, we  were not able to proceed in a standard critical view of safety dissection of the cystic duct and artery.  We instead turned our attention towards top-down method.  Liver retractor was inserted and used to elevate the liver while top-down cholecystectomy was performed.  With this, there was moderate bleeding whenever the liver parenchyma was entered.  There appeared to be sinusoidal hypertension.  This was controlled with pressure and cautery.  We then proceeded to use blunt dissection and electrocautery to continue our dissection down off the liver bed.  At one point, we had dense scarring of the gallbladder to the liver bed itself.  Here, a brief cholecystotomy was made.  We then continued dissecting below this.  Additional dissection allowed us to get down to the infundibulum.  The infundibulum was not yet narrowed down into a clippable neck.  We dissected a little further to get a smaller infundibulum / proper cystic duct.  Upon doing this, we made another cholecystotomy.  This we were able to control with a BAY stapler.  We then clipped this to identify it.  The gallbladder was then removed through an EndoCatch bag.  We then proceeded to place a vessel loop around the infundibular stump and staple line to further secure this.  We then proceeded to inspect for hemostasis.  The gallbladder bed now had adequate hemostasis.  We added Liat to assist in further hemostasis  postop.  Any previous bleeding was aspirated.  We then thoroughly irrigated the abdomen.  A 15-Citizen of [place] SIDDHARTHA drain was inserted and placed in the gallbladder fossa.  The other trocar sites were removed under direct vision.  The subxiphoid incision was closed with a 0 Vicryl suture in the deep connective tissue, as well as the remaining sites closed with 4-0 Monocryl.  At the end of the case, all suture, needle and instrument counts were correct.  The patient was taken to recovery in satisfactory condition.        The patient should undergo further  testing for the nature of the cirrhosis, given the presence of ascites and evidence of sinusoidal hypertension.         GERARD TALBOT MD        [facility]     D: 2020   T: 2020   MT: QUENTIN      Name:     SHELLEY VARGAS   MRN:      [id number]        Account:        [id number]   :      1950           Procedure Date: 2020      Document: [id number]

## 2020-07-11 ENCOUNTER — HOSPITAL ENCOUNTER (INPATIENT)
Facility: OTHER | Age: 70
LOS: 4 days | Discharge: HOME | DRG: 388 | End: 2020-07-15
Attending: PHYSICIAN ASSISTANT | Admitting: INTERNAL MEDICINE
Payer: MEDICARE

## 2020-07-11 ENCOUNTER — APPOINTMENT (OUTPATIENT)
Dept: GENERAL RADIOLOGY | Facility: OTHER | Age: 70
DRG: 388 | End: 2020-07-11
Attending: PHYSICIAN ASSISTANT
Payer: MEDICARE

## 2020-07-11 DIAGNOSIS — Z79.891 ADMISSION FOR LONG-TERM OPIATE ANALGESIC USE: ICD-10-CM

## 2020-07-11 DIAGNOSIS — K74.60 CIRRHOSIS OF LIVER WITH ASCITES, UNSPECIFIED HEPATIC CIRRHOSIS TYPE (H): Primary | ICD-10-CM

## 2020-07-11 DIAGNOSIS — R14.0 ABDOMINAL DISTENTION: ICD-10-CM

## 2020-07-11 DIAGNOSIS — R18.8 CIRRHOSIS OF LIVER WITH ASCITES, UNSPECIFIED HEPATIC CIRRHOSIS TYPE (H): Primary | ICD-10-CM

## 2020-07-11 DIAGNOSIS — R14.0 ABDOMINAL DISTENSION: ICD-10-CM

## 2020-07-11 DIAGNOSIS — Z98.890 PERSONAL HISTORY OF SURGERY TO HEART AND GREAT VESSELS, PRESENTING HAZARDS TO HEALTH: ICD-10-CM

## 2020-07-11 DIAGNOSIS — Z90.49 S/P LAPAROSCOPIC CHOLECYSTECTOMY: ICD-10-CM

## 2020-07-11 DIAGNOSIS — Z79.899 ENCOUNTER FOR LONG-TERM (CURRENT) USE OF OTHER MEDICATIONS: ICD-10-CM

## 2020-07-11 DIAGNOSIS — K50.919 CROHN'S DISEASE WITH COMPLICATION, UNSPECIFIED GASTROINTESTINAL TRACT LOCATION (H): Chronic | ICD-10-CM

## 2020-07-11 DIAGNOSIS — Z87.891 PERSONAL HISTORY OF TOBACCO USE, PRESENTING HAZARDS TO HEALTH: ICD-10-CM

## 2020-07-11 DIAGNOSIS — K21.9 GASTROESOPHAGEAL REFLUX DISEASE WITHOUT ESOPHAGITIS: ICD-10-CM

## 2020-07-11 DIAGNOSIS — Z79.82 ENCOUNTER FOR LONG-TERM (CURRENT) USE OF ASPIRIN: ICD-10-CM

## 2020-07-11 DIAGNOSIS — K56.609 SBO (SMALL BOWEL OBSTRUCTION) (H): ICD-10-CM

## 2020-07-11 PROBLEM — Z92.25 PERSONAL HISTORY OF IMMUNOSUPPRESSIVE THERAPY: Chronic | Status: ACTIVE | Noted: 2019-08-22

## 2020-07-11 PROBLEM — K70.31 ALCOHOLIC CIRRHOSIS OF LIVER WITH ASCITES (H): Status: ACTIVE | Noted: 2020-07-11

## 2020-07-11 LAB
ALBUMIN SERPL-MCNC: 3.7 G/DL (ref 3.5–5.7)
ALBUMIN UR-MCNC: 10 MG/DL
ALP SERPL-CCNC: 115 U/L (ref 34–104)
ALT SERPL W P-5'-P-CCNC: 33 U/L (ref 7–52)
ANION GAP SERPL CALCULATED.3IONS-SCNC: 8 MMOL/L (ref 3–14)
APPEARANCE FLD: CLEAR
APPEARANCE UR: CLEAR
AST SERPL W P-5'-P-CCNC: 38 U/L (ref 13–39)
BASOPHILS # BLD AUTO: 0.1 10E9/L (ref 0–0.2)
BASOPHILS NFR BLD AUTO: 0.8 %
BILIRUB SERPL-MCNC: 2.9 MG/DL (ref 0.3–1)
BILIRUB UR QL STRIP: NEGATIVE
BUN SERPL-MCNC: 29 MG/DL (ref 7–25)
CALCIUM SERPL-MCNC: 9 MG/DL (ref 8.6–10.3)
CHLORIDE SERPL-SCNC: 98 MMOL/L (ref 98–107)
CO2 SERPL-SCNC: 27 MMOL/L (ref 21–31)
COLOR FLD: YELLOW
COLOR UR AUTO: YELLOW
CREAT SERPL-MCNC: 0.96 MG/DL (ref 0.7–1.3)
DIFFERENTIAL METHOD BLD: ABNORMAL
EOSINOPHIL # BLD AUTO: 0.4 10E9/L (ref 0–0.7)
EOSINOPHIL NFR BLD AUTO: 6.4 %
ERYTHROCYTE [DISTWIDTH] IN BLOOD BY AUTOMATED COUNT: 15.9 % (ref 10–15)
GFR SERPL CREATININE-BSD FRML MDRD: 78 ML/MIN/{1.73_M2}
GLUCOSE SERPL-MCNC: 101 MG/DL (ref 70–105)
GLUCOSE UR STRIP-MCNC: NEGATIVE MG/DL
HCT VFR BLD AUTO: 49.7 % (ref 40–53)
HGB BLD-MCNC: 17.4 G/DL (ref 13.3–17.7)
HGB UR QL STRIP: NEGATIVE
IMM GRANULOCYTES # BLD: 0 10E9/L (ref 0–0.4)
IMM GRANULOCYTES NFR BLD: 0.5 %
INR PPP: 1.27 (ref 0–1.3)
KETONES UR STRIP-MCNC: 10 MG/DL
LACTATE BLD-SCNC: 1.7 MMOL/L (ref 0.7–2)
LEUKOCYTE ESTERASE UR QL STRIP: NEGATIVE
LYMPHOCYTES # BLD AUTO: 0.7 10E9/L (ref 0.8–5.3)
LYMPHOCYTES NFR BLD AUTO: 11.4 %
MCH RBC QN AUTO: 34.4 PG (ref 26.5–33)
MCHC RBC AUTO-ENTMCNC: 35 G/DL (ref 31.5–36.5)
MCV RBC AUTO: 98 FL (ref 78–100)
MONOCYTES # BLD AUTO: 0.9 10E9/L (ref 0–1.3)
MONOCYTES NFR BLD AUTO: 15.7 %
MONOS+MACROS NFR FLD MANUAL: 77 %
MUCOUS THREADS #/AREA URNS LPF: PRESENT /LPF
NEUTROPHILS # BLD AUTO: 3.9 10E9/L (ref 1.6–8.3)
NEUTROPHILS NFR BLD AUTO: 65.2 %
NEUTS BAND NFR FLD MANUAL: 23 %
NITRATE UR QL: NEGATIVE
PH FLD: 8.5 PH
PH UR STRIP: 6 PH (ref 5–7)
PLATELET # BLD AUTO: 152 10E9/L (ref 150–450)
POTASSIUM SERPL-SCNC: 4.1 MMOL/L (ref 3.5–5.1)
PROT SERPL-MCNC: 6.6 G/DL (ref 6.4–8.9)
RBC # BLD AUTO: 5.06 10E12/L (ref 4.4–5.9)
RBC # FLD: 1000 /UL
RBC #/AREA URNS AUTO: <1 /HPF (ref 0–2)
SODIUM SERPL-SCNC: 133 MMOL/L (ref 134–144)
SOURCE: ABNORMAL
SP GR UR STRIP: 1.03 (ref 1–1.03)
SPECIMEN SOURCE FLD: NORMAL
SPECIMEN SOURCE FLD: NORMAL
TROPONIN I SERPL-MCNC: 7.2 PG/ML
UROBILINOGEN UR STRIP-MCNC: 2 MG/DL (ref 0–2)
WBC # BLD AUTO: 5.9 10E9/L (ref 4–11)
WBC # FLD AUTO: 158 /UL
WBC #/AREA URNS AUTO: 2 /HPF (ref 0–5)

## 2020-07-11 PROCEDURE — 89051 BODY FLUID CELL COUNT: CPT | Performed by: INTERNAL MEDICINE

## 2020-07-11 PROCEDURE — 82042 OTHER SOURCE ALBUMIN QUAN EA: CPT | Performed by: INTERNAL MEDICINE

## 2020-07-11 PROCEDURE — 99285 EMERGENCY DEPT VISIT HI MDM: CPT | Mod: 25 | Performed by: PHYSICIAN ASSISTANT

## 2020-07-11 PROCEDURE — 85610 PROTHROMBIN TIME: CPT | Performed by: PHYSICIAN ASSISTANT

## 2020-07-11 PROCEDURE — 99223 1ST HOSP IP/OBS HIGH 75: CPT | Mod: AI | Performed by: INTERNAL MEDICINE

## 2020-07-11 PROCEDURE — 12000000 ZZH R&B MED SURG/OB

## 2020-07-11 PROCEDURE — 36415 COLL VENOUS BLD VENIPUNCTURE: CPT | Performed by: PHYSICIAN ASSISTANT

## 2020-07-11 PROCEDURE — 96375 TX/PRO/DX INJ NEW DRUG ADDON: CPT | Performed by: PHYSICIAN ASSISTANT

## 2020-07-11 PROCEDURE — 25000128 H RX IP 250 OP 636: Performed by: PHYSICIAN ASSISTANT

## 2020-07-11 PROCEDURE — 85025 COMPLETE CBC W/AUTO DIFF WBC: CPT | Performed by: PHYSICIAN ASSISTANT

## 2020-07-11 PROCEDURE — 99223 1ST HOSP IP/OBS HIGH 75: CPT | Mod: 25 | Performed by: SURGERY

## 2020-07-11 PROCEDURE — 96376 TX/PRO/DX INJ SAME DRUG ADON: CPT | Performed by: PHYSICIAN ASSISTANT

## 2020-07-11 PROCEDURE — 93005 ELECTROCARDIOGRAM TRACING: CPT | Performed by: PHYSICIAN ASSISTANT

## 2020-07-11 PROCEDURE — 87181 SC STD AGAR DILUTION PER AGT: CPT | Performed by: INTERNAL MEDICINE

## 2020-07-11 PROCEDURE — 87070 CULTURE OTHR SPECIMN AEROBIC: CPT | Performed by: INTERNAL MEDICINE

## 2020-07-11 PROCEDURE — 87040 BLOOD CULTURE FOR BACTERIA: CPT | Performed by: PHYSICIAN ASSISTANT

## 2020-07-11 PROCEDURE — 96374 THER/PROPH/DIAG INJ IV PUSH: CPT | Performed by: PHYSICIAN ASSISTANT

## 2020-07-11 PROCEDURE — 87077 CULTURE AEROBIC IDENTIFY: CPT | Performed by: INTERNAL MEDICINE

## 2020-07-11 PROCEDURE — 83690 ASSAY OF LIPASE: CPT | Performed by: INTERNAL MEDICINE

## 2020-07-11 PROCEDURE — 84484 ASSAY OF TROPONIN QUANT: CPT | Performed by: PHYSICIAN ASSISTANT

## 2020-07-11 PROCEDURE — 25800030 ZZH RX IP 258 OP 636: Performed by: INTERNAL MEDICINE

## 2020-07-11 PROCEDURE — 99285 EMERGENCY DEPT VISIT HI MDM: CPT | Mod: Z6 | Performed by: PHYSICIAN ASSISTANT

## 2020-07-11 PROCEDURE — 74018 RADEX ABDOMEN 1 VIEW: CPT

## 2020-07-11 PROCEDURE — 0D9670Z DRAINAGE OF STOMACH WITH DRAINAGE DEVICE, VIA NATURAL OR ARTIFICIAL OPENING: ICD-10-PCS | Performed by: PHYSICIAN ASSISTANT

## 2020-07-11 PROCEDURE — 83986 ASSAY PH BODY FLUID NOS: CPT | Performed by: INTERNAL MEDICINE

## 2020-07-11 PROCEDURE — 83605 ASSAY OF LACTIC ACID: CPT | Performed by: PHYSICIAN ASSISTANT

## 2020-07-11 PROCEDURE — 74019 RADEX ABDOMEN 2 VIEWS: CPT

## 2020-07-11 PROCEDURE — 25800030 ZZH RX IP 258 OP 636: Performed by: PHYSICIAN ASSISTANT

## 2020-07-11 PROCEDURE — 81001 URINALYSIS AUTO W/SCOPE: CPT | Performed by: PHYSICIAN ASSISTANT

## 2020-07-11 PROCEDURE — 93010 ELECTROCARDIOGRAM REPORT: CPT | Performed by: INTERNAL MEDICINE

## 2020-07-11 PROCEDURE — 25000128 H RX IP 250 OP 636: Performed by: INTERNAL MEDICINE

## 2020-07-11 PROCEDURE — 80053 COMPREHEN METABOLIC PANEL: CPT | Performed by: PHYSICIAN ASSISTANT

## 2020-07-11 PROCEDURE — 87205 SMEAR GRAM STAIN: CPT | Performed by: INTERNAL MEDICINE

## 2020-07-11 RX ORDER — PROCHLORPERAZINE MALEATE 5 MG
5 TABLET ORAL EVERY 6 HOURS PRN
Status: DISCONTINUED | OUTPATIENT
Start: 2020-07-11 | End: 2020-07-15 | Stop reason: HOSPADM

## 2020-07-11 RX ORDER — HYDROMORPHONE HYDROCHLORIDE 1 MG/ML
.5-1 INJECTION, SOLUTION INTRAMUSCULAR; INTRAVENOUS; SUBCUTANEOUS
Status: DISCONTINUED | OUTPATIENT
Start: 2020-07-11 | End: 2020-07-15 | Stop reason: HOSPADM

## 2020-07-11 RX ORDER — SODIUM CHLORIDE, SODIUM LACTATE, POTASSIUM CHLORIDE, CALCIUM CHLORIDE 600; 310; 30; 20 MG/100ML; MG/100ML; MG/100ML; MG/100ML
INJECTION, SOLUTION INTRAVENOUS CONTINUOUS
Status: DISCONTINUED | OUTPATIENT
Start: 2020-07-11 | End: 2020-07-13

## 2020-07-11 RX ORDER — PROCHLORPERAZINE 25 MG
12.5 SUPPOSITORY, RECTAL RECTAL EVERY 12 HOURS PRN
Status: DISCONTINUED | OUTPATIENT
Start: 2020-07-11 | End: 2020-07-15 | Stop reason: HOSPADM

## 2020-07-11 RX ORDER — ONDANSETRON 2 MG/ML
4 INJECTION INTRAMUSCULAR; INTRAVENOUS EVERY 30 MIN PRN
Status: DISCONTINUED | OUTPATIENT
Start: 2020-07-11 | End: 2020-07-13

## 2020-07-11 RX ORDER — LIDOCAINE 40 MG/G
CREAM TOPICAL
Status: DISCONTINUED | OUTPATIENT
Start: 2020-07-11 | End: 2020-07-13

## 2020-07-11 RX ORDER — ONDANSETRON 2 MG/ML
4 INJECTION INTRAMUSCULAR; INTRAVENOUS EVERY 6 HOURS PRN
Status: DISCONTINUED | OUTPATIENT
Start: 2020-07-11 | End: 2020-07-15 | Stop reason: HOSPADM

## 2020-07-11 RX ORDER — FENTANYL CITRATE 50 UG/ML
50 INJECTION, SOLUTION INTRAMUSCULAR; INTRAVENOUS ONCE
Status: COMPLETED | OUTPATIENT
Start: 2020-07-11 | End: 2020-07-11

## 2020-07-11 RX ORDER — ONDANSETRON 4 MG/1
4 TABLET, ORALLY DISINTEGRATING ORAL EVERY 6 HOURS PRN
Status: DISCONTINUED | OUTPATIENT
Start: 2020-07-11 | End: 2020-07-15 | Stop reason: HOSPADM

## 2020-07-11 RX ORDER — FENTANYL CITRATE 50 UG/ML
50 INJECTION, SOLUTION INTRAMUSCULAR; INTRAVENOUS EVERY 30 MIN PRN
Status: COMPLETED | OUTPATIENT
Start: 2020-07-11 | End: 2020-07-11

## 2020-07-11 RX ORDER — SODIUM CHLORIDE 9 MG/ML
INJECTION, SOLUTION INTRAVENOUS CONTINUOUS
Status: DISCONTINUED | OUTPATIENT
Start: 2020-07-11 | End: 2020-07-11

## 2020-07-11 RX ORDER — NALOXONE HYDROCHLORIDE 0.4 MG/ML
.1-.4 INJECTION, SOLUTION INTRAMUSCULAR; INTRAVENOUS; SUBCUTANEOUS
Status: DISCONTINUED | OUTPATIENT
Start: 2020-07-11 | End: 2020-07-15 | Stop reason: HOSPADM

## 2020-07-11 RX ORDER — AZATHIOPRINE 50 MG/1
100 TABLET ORAL DAILY
Status: DISCONTINUED | OUTPATIENT
Start: 2020-07-12 | End: 2020-07-12

## 2020-07-11 RX ADMIN — FENTANYL CITRATE 50 MCG: 50 INJECTION INTRAMUSCULAR; INTRAVENOUS at 13:21

## 2020-07-11 RX ADMIN — SODIUM CHLORIDE, POTASSIUM CHLORIDE, SODIUM LACTATE AND CALCIUM CHLORIDE: 600; 310; 30; 20 INJECTION, SOLUTION INTRAVENOUS at 16:03

## 2020-07-11 RX ADMIN — FENTANYL CITRATE 50 MCG: 50 INJECTION INTRAMUSCULAR; INTRAVENOUS at 13:55

## 2020-07-11 RX ADMIN — HYDROMORPHONE HYDROCHLORIDE 1 MG: 1 INJECTION, SOLUTION INTRAMUSCULAR; INTRAVENOUS; SUBCUTANEOUS at 16:03

## 2020-07-11 RX ADMIN — FENTANYL CITRATE 50 MCG: 50 INJECTION INTRAMUSCULAR; INTRAVENOUS at 18:24

## 2020-07-11 RX ADMIN — FENTANYL CITRATE 50 MCG: 50 INJECTION, SOLUTION INTRAMUSCULAR; INTRAVENOUS at 14:17

## 2020-07-11 RX ADMIN — SODIUM CHLORIDE: 9 INJECTION, SOLUTION INTRAVENOUS at 14:42

## 2020-07-11 RX ADMIN — ONDANSETRON 4 MG: 2 INJECTION INTRAMUSCULAR; INTRAVENOUS at 13:21

## 2020-07-11 RX ADMIN — SODIUM CHLORIDE 1000 ML: 9 INJECTION, SOLUTION INTRAVENOUS at 13:20

## 2020-07-11 RX ADMIN — ONDANSETRON 4 MG: 2 INJECTION INTRAMUSCULAR; INTRAVENOUS at 16:14

## 2020-07-11 RX ADMIN — PROCHLORPERAZINE EDISYLATE 5 MG: 5 INJECTION INTRAMUSCULAR; INTRAVENOUS at 18:21

## 2020-07-11 ASSESSMENT — ENCOUNTER SYMPTOMS
CONSTIPATION: 0
DIARRHEA: 0
WHEEZING: 0
NECK PAIN: 0
VOMITING: 1
FEVER: 0
BACK PAIN: 0
LIGHT-HEADEDNESS: 0
SEIZURES: 0
COLOR CHANGE: 0
ACTIVITY CHANGE: 0
FACIAL SWELLING: 0
SHORTNESS OF BREATH: 0
DYSURIA: 0
STRIDOR: 0
ABDOMINAL PAIN: 1
CHEST TIGHTNESS: 0
TREMORS: 0
FREQUENCY: 0
SORE THROAT: 0
FACIAL ASYMMETRY: 0
HEADACHES: 0
COUGH: 0
SPEECH DIFFICULTY: 0
WEAKNESS: 0
FATIGUE: 0
RHINORRHEA: 0
APPETITE CHANGE: 1
EYE PAIN: 0
TROUBLE SWALLOWING: 0
ABDOMINAL DISTENTION: 1
NAUSEA: 1
NECK STIFFNESS: 0
DIZZINESS: 0

## 2020-07-11 ASSESSMENT — ACTIVITIES OF DAILY LIVING (ADL)
ADLS_ACUITY_SCORE: 12
ADLS_ACUITY_SCORE: 10

## 2020-07-11 ASSESSMENT — MIFFLIN-ST. JEOR: SCORE: 1534.22

## 2020-07-11 NOTE — H&P
[facility]    History and Physical - Hospitalist Service       Date of Admission:  7/11/2020    Assessment & Plan   Phi Tabares is a 69 year old male admitted on 7/11/2020.     Nausea and vomiting postoperatively: Ileus vs small bowel obstruction.  Patient had laparoscopic cholecystectomy on 7/6/2020, with postop course complicated with difficult extubation.  Intraoperative report reviewed- noted intra-abdominal adhesions from previous surgeries, and new findings of liver cirrhosis with ascites.  History of previous intra-abdominal surgeries: Partial colon resection due to Crohn's disease.  Abdominal x-ray suggestive of SBO, no free air.  Normal lactate, WBCs.  States history of frequent SBO's, likely due to Crohn's exacerbation, requiring prolonged steroid therapy.  He says since started on Imuran, no recurrence of SBO's.  NGT was placed in ED, initially suctioned 100 mL's of clear fluid.  N.p.o., IVF, IV Protonix, IV Dilaudid for pain.  Dr. Dubois patient, ordered CT abdomen.    Dehydration, with preserved renal function, due to poor p.o. postoperatively and emesis.  IV fluids.  Monitor and replace as needed electrolytes, monitor renal function.    History of Crohn's disease, s/p partial bowel resection.  States no exacerbations for long time.  On Imuran, will continue, clamp NGT 30 minutes after dose of Imuran.    Liver cirrhosis with ascites, per operative report on 7/6/2020.  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  Lab work-up for liver cirrhosis including acute hepatitis panel, anti-smooth muscle antibody, ANCA, ceruloplasmin, ferritin to be drawn with a.m. labs.  Follows with Dr. Rodney Ponce, GI.       Diet: N.p.o., except for ice chips for comfort.  DVT Prophylaxis: Pneumatic Compression Devices, no heparin products in the case need to go to the OR.  Hogan Catheter: not present  Code Status: Full code.         Disposition Plan   Expected  discharge: 2 - 3 days, recommended to prior living arrangement once adequate pain management/ tolerating PO medications.  Entered: Marilu Delgadillo MD 07/11/2020, 2:31 PM     The patient's care was discussed with the Bedside Nurse, Patient and Dr. Dubois.    Marilu Delgadillo MD  [facility]    _____________________________________________________________________    Chief Complaint Abdominal pain, nausea, vomiting    History is obtained from the patient    History of Present Illness   Phi Tabares is a 69 year old male who with PMH of Crohn's disease, on Imuran, s/p partial colon resection due to Crohn's complications, remote history of recurrent SBO's likely due to Crohn exacerbation as only treatment with steroids elevate exacerbations, iron deficiency anemia, GERD, s/p laparoscopic cholecystectomy on 7/6/2020, presented to ED with above-mentioned complaints.  He says postoperatively he stayed for while in the PACU due to difficult extubation, although was discharged home on the day of the surgery.  He never felt right postoperatively, including from day 1 due to nausea, diffuse abdominal pain.  He was discharged home with SIDDHARTHA drain, with substantial amount of clear yellowish fluid draining daily.  Patient was able to eat due to nausea, and on Thursday, 7/9 develop multiple episodes of nonbloody non-biliary emesis.  Vomiting subsided by 3 AM.  Patient was able to have some clear liquids/Gatorade and small amount of noodle soup yesterday.  He denies fevers or chills.  No bowel movement since surgery.  Passed small amount of flatus yesterday.  He feels his abdomen is distended.  No drainage or swelling surrounding surgical incisions.  Abdominal x-ray consistent with SBO.  No recent ill contacts.  No symptoms concerning for COVID-19 infection.    Review of Systems    The 10 point Review of Systems is negative other than noted in the HPI or here.    Past Medical History    I have reviewed this  "patient's medical history and updated it with pertinent information if needed.   No past medical history on file.    Past Surgical History   I have reviewed this patient's surgical history and updated it with pertinent information if needed.  Past Surgical History:   Procedure Laterality Date     ------------OTHER-------------  1980s    colon resection     CATARACT IOL, RT/LT      bilateral removal and lens placed     COLONOSCOPY  2017,[facility]     LAPAROSCOPIC CHOLECYSTECTOMY N/A 2020    Procedure: CHOLECYSTECTOMY, LAPAROSCOPIC;  Surgeon: Ludwig Bella MD;  Location:  OR       Social History   I have reviewed this patient's social history and updated it with pertinent information if needed.      Family History   I have reviewed this patient's family history and updated it with pertinent information if needed.  Family History   Problem Relation Age of Onset     Breast Cancer Mother      Diabetes Father      Thyroid Disease Sister      Diabetes Brother      Thyroid Disease Sister      Gastrointestinal Disease Sister         chrons       Prior to Admission Medications   Prior to Admission Medications   Prescriptions Last Dose Informant Patient Reported? Taking?   Ascorbic Acid (VITAMIN C) 500 MG CAPS Past Week at Unknown time  Yes Yes   Sig: Take by mouth daily   BD DISP NEEDLES 22G X 1-1/2\" MISC Past Month at Unknown time  No Yes   Sig: USE FOR VITAMIN B-12 INJECTIONS EVERY 2 WEEKS   Ferrous Sulfate (IRON) 325 (65 Fe) MG tablet Past Week at Unknown time  No Yes   Sig: Take 1 tablet by mouth daily (with breakfast)   Probiotic Product (PROBIOTIC ACIDOPHILUS) CAPS Past Week at Unknown time  Yes Yes   Si times daily Probiotic & Acidophilus 300 million cell 250 mg Cap   acetaminophen (TYLENOL) 325 MG tablet Past Week at Unknown time  No Yes   Sig: Take 2 tablets (650 mg) by mouth every 4 hours as needed for other (mild pain)   aspirin EC 81 MG EC tablet Past Week at Unknown time  Yes " Yes   Sig: Take 81 mg by mouth daily with food   atorvastatin (LIPITOR) 10 MG tablet Past Week at Unknown time  No Yes   Sig: Take 1 tablet (10 mg) by mouth daily   azaTHIOprine (IMURAN) 50 MG tablet   Yes No   Sig: Take 100 tablets by mouth every morning   benzonatate (TESSALON) 100 MG capsule Past Week at Unknown time  No Yes   Sig: Take 1 capsule (100 mg) by mouth 3 times daily as needed for cough   calcium carbonate-vitamin D (OS-JULIUS) 500-400 MG-UNIT tablet Past Week at Unknown time  Yes Yes   Sig: Take 1 tablet by mouth daily   cyanocobalamin (CYANOCOBALAMIN) 1000 MCG/ML injection Past Month at Unknown time  No Yes   Sig: INJECT 1 ML INTO THE MUSCLE EVERY 3 WEEKS   ibuprofen (ADVIL/MOTRIN) 600 MG tablet 7/10/2020 at Unknown time  No Yes   Sig: Take 1 tablet (600 mg) by mouth every 6 hours as needed for pain (mild)   oxyCODONE-acetaminophen (PERCOCET) 5-325 MG tablet Past Week at Unknown time  No Yes   Sig: Take 1-2 tablets by mouth every 4 hours as needed for pain (moderate to severe)   pantoprazole (PROTONIX) 40 MG EC tablet Past Week at Unknown time  No Yes   Sig: Take 1 tablet (40 mg) by mouth 2 times daily   senna-docusate (SENOKOT-S/PERICOLACE) 8.6-50 MG tablet Past Week at Unknown time  No Yes   Sig: Take 1-2 tablets by mouth 2 times daily Take while on oral narcotics to prevent or treat constipation.      Facility-Administered Medications: None     Allergies   Allergies   Allergen Reactions     Dye [Contrast Dye]      IV Dye, Iodine Containing Contrast Media     Iodine Rash     Shrimp Rash       Physical Exam   Vital Signs: Temp: 97  F (36.1  C) Temp src: Temporal BP: 126/82 Pulse: 73   Resp: 20 SpO2: 96 %      Weight: 180 lbs 0 oz    General: Alert and oriented #3.  No apparent distress.  HEENT: Pupils equal reactive to light and accommodation, no scleral icterus.  No thyromegaly or cervical lymphadenopathy.  Lungs: Clear to auscultation bilaterally, good air movement, no rhonchi or  wheezes.  Cardiovascular: Rhythmic and regular S1-S2, no murmurs.  Abdomen: Semi-distended, surgical incisions healing.  Old midline surgical scar.  SIDDHARTHA in place, draining clear pale yellow fluid.  No peripheral edema.  Skin: No rash or lesions, except surgical scars as above.    Data   Data reviewed today: I reviewed all medications, new labs and imaging results over the last 24 hours.  Personally reviewed labs, abdominal x-ray.    Recent Labs   Lab 07/11/20  1322   WBC 5.9   HGB 17.4   MCV 98      INR 1.27   *   POTASSIUM 4.1   CHLORIDE 98   CO2 27   BUN 29*   CR 0.96   ANIONGAP 8   JULIUS 9.0      ALBUMIN 3.7   PROTTOTAL 6.6   BILITOTAL 2.9*   ALKPHOS 115*   ALT 33   AST 38     Recent Results (from the past 24 hour(s))   XR Abdomen 2 Views    Narrative    XR ABDOMEN 2 VW   7/11/2020 1:40 PM    History:Male,age  69 years, possible partial SBO    Comparison: None    FINDINGS: Two views are submitted. There are number of gas-filled,  distended small bowel loops throughout the abdomen and pelvis. Upright  examination demonstrates no evidence of free air. Drainage catheter is  seen in the right hemiabdomen.      Impression    IMPRESSION:  Small bowel obstruction without evidence of free air..    SEPIDEH HAND MD   XR Abdomen Port 1 View    Narrative    XR ABDOMEN PORT 1 VW, 7/11/2020 3:18 PM    History: Male, age 69 years; NG tube placement confirmation    Comparison: 7/11/2020    Technique: Single view .    FINDINGS: Nasogastric tube is now seen extending into the left upper  quadrants. The tip is in the expected location of the stomach.  Sidehole is not well seen. Lung bases are clear. Bowel gas pattern is  similar as is a right upper quadrant drain.      Impression    IMPRESSION:   Nasogastric tube tip appears to lie in the expected location of the  stomach. Sidehole is not well seen however may lie at the GE junction.    SEPIDEH HAND MD

## 2020-07-11 NOTE — PLAN OF CARE
"/77   Pulse 89   Temp 98.6  F (37  C) (Tympanic)   Resp 20   Ht 1.676 m (5' 6\")   Wt 81.6 kg (180 lb)   SpO2 96%   BMI 29.05 kg/m      Admission Note    Data:  Phi Tabares admitted to [facility] from emergency room at 1515.      Action:  Dr. Dubois/Elie have been notified of admission. Pt oriented to unit, call light in reach.     Response:  Patient tolerated transfer well.  Aracelis Bucio RN.............................7/11/2020 4:54 PM      "

## 2020-07-11 NOTE — ED PROVIDER NOTES
History     Chief Complaint   Patient presents with     Post-op Problem     HPI  Phi Tabares is a 69 year old male who underwent a laparoscopic cholecystectomy with SIDDHARTHA drain placement by Dr. Ludwig Bella on 7/6/2020.  He reports that since that time his appetite has been significantly decreased.  He has tried sips of Gatorade.  He reports he has not had a bowel movement since then either.  Denies any fever or chills.  His sister-in-law reports that he has had significant drainage from his SIDDHARTHA where it needs to be emptied every 3-4 hours.  He has been having some abdominal cramping as well.  He has had increased pain and has not been able to take his pain medications due to nausea and vomiting.  Rates his pain is 10/10.    Allergies:  Allergies   Allergen Reactions     Dye [Contrast Dye]      IV Dye, Iodine Containing Contrast Media     Iodine Rash     Shrimp Rash       Problem List:    Patient Active Problem List    Diagnosis Date Noted     S/P partial resection of colon 08/22/2019     Priority: Medium     Personal history of immunosuppressive therapy 08/22/2019     Priority: Medium     Exposure to asbestos 08/22/2019     Priority: Medium     Personal history of tobacco use, presenting hazards to health 08/22/2019     Priority: Medium     Iron deficiency anemia, unspecified iron deficiency anemia type 08/22/2019     Priority: Medium     GERD with esophagitis 08/18/2017     Priority: Medium     Crohn's disease with complication (H) 04/26/2017     Priority: Medium     Osteoarthrosis 04/26/2017     Priority: Medium     Pernicious anemia 04/26/2017     Priority: Medium     ACP (advance care planning) 08/01/2016     Priority: Medium     Advance Care Planning 8/1/2016: ACP Review of Chart / Resources Provided:  Reviewed chart for advance care plan.  Phi Tabares  has no plan or code status on file however states presence of ACP document. Copy requested. Confirmed code status reflects current choices pending receipt of  document/advance care plan review.  Confirmed/documented legally designated decision makers.  Added by Debby Lyn             Hyperlipidemia 06/15/2015     Priority: Medium     Diagnosis updated by automated process. Provider to review and confirm.       ED (erectile dysfunction) 2014     Priority: Medium     Disorder of bursae and tendons in shoulder region 2011     Priority: Medium     Problem list name updated by automated process. Provider to review       Greer's esophagus determined by endoscopy 2011     Priority: Medium     Overview:   Per Dr Scott (GI at [facility])       Osteoporosis 2011     Priority: Medium     Problem list name updated by automated process. Provider to review       GERD 2011     Priority: Medium        Past Medical History:    No past medical history on file.    Past Surgical History:    Past Surgical History:   Procedure Laterality Date     ------------OTHER-------------  1980s    colon resection     CATARACT IOL, RT/LT      bilateral removal and lens placed     COLONOSCOPY  2017,[facility]     LAPAROSCOPIC CHOLECYSTECTOMY N/A 2020    Procedure: CHOLECYSTECTOMY, LAPAROSCOPIC;  Surgeon: Ludwig Bella MD;  Location:  OR       Family History:    Family History   Problem Relation Age of Onset     Breast Cancer Mother      Diabetes Father      Thyroid Disease Sister      Diabetes Brother      Thyroid Disease Sister      Gastrointestinal Disease Sister         chrons       Social History:  Marital Status:   [2]  Social History     Tobacco Use     Smoking status: Former Smoker     Packs/day: 1.00     Years: 25.00     Pack years: 25.00     Last attempt to quit: 2008     Years since quittin.5     Smokeless tobacco: Never Used   Substance Use Topics     Alcohol use: Yes     Alcohol/week: 1.0 standard drinks     Comment: occasionally uses alcohol socially     Drug use: No        Medications:    acetaminophen  "(TYLENOL) 325 MG tablet  Ascorbic Acid (VITAMIN C) 500 MG CAPS  aspirin EC 81 MG EC tablet  atorvastatin (LIPITOR) 10 MG tablet  BD DISP NEEDLES 22G X 1-1/2\" MISC  benzonatate (TESSALON) 100 MG capsule  calcium carbonate-vitamin D (OS-JULIUS) 500-400 MG-UNIT tablet  cyanocobalamin (CYANOCOBALAMIN) 1000 MCG/ML injection  Ferrous Sulfate (IRON) 325 (65 Fe) MG tablet  ibuprofen (ADVIL/MOTRIN) 600 MG tablet  oxyCODONE-acetaminophen (PERCOCET) 5-325 MG tablet  pantoprazole (PROTONIX) 40 MG EC tablet  Probiotic Product (PROBIOTIC ACIDOPHILUS) CAPS  senna-docusate (SENOKOT-S/PERICOLACE) 8.6-50 MG tablet  azaTHIOprine (IMURAN) 50 MG tablet          Review of Systems   Constitutional: Positive for appetite change. Negative for activity change, fatigue and fever.   HENT: Negative for drooling, facial swelling, rhinorrhea, sore throat and trouble swallowing.    Eyes: Negative for pain and visual disturbance.   Respiratory: Negative for cough, chest tightness, shortness of breath, wheezing and stridor.    Cardiovascular: Negative for chest pain and leg swelling.   Gastrointestinal: Positive for abdominal distention, abdominal pain, nausea and vomiting. Negative for constipation and diarrhea.   Genitourinary: Negative for dysuria, frequency and urgency.   Musculoskeletal: Negative for back pain, neck pain and neck stiffness.   Skin: Negative for color change.   Neurological: Negative for dizziness, tremors, seizures, facial asymmetry, speech difficulty, weakness, light-headedness and headaches.       Physical Exam   BP: 126/82  Pulse: 73  Temp: 97  F (36.1  C)  Resp: 20  Height: 167.6 cm (5' 6\")  Weight: 81.6 kg (180 lb)  SpO2: 96 %      Physical Exam  Constitutional:       General: He is not in acute distress.     Appearance: He is not ill-appearing, toxic-appearing or diaphoretic.   HENT:      Head: Atraumatic.      Right Ear: Tympanic membrane normal. No drainage or tenderness.      Left Ear: Tympanic membrane normal. No " drainage or tenderness.      Nose: Nose normal. No rhinorrhea.   Eyes:      General: No scleral icterus.     Extraocular Movements: Extraocular movements intact.      Right eye: Normal extraocular motion and no nystagmus.      Left eye: Normal extraocular motion and no nystagmus.      Pupils: Pupils are equal, round, and reactive to light. Pupils are equal.      Funduscopic exam:     Right eye: No AV nicking or papilledema. Red reflex present.         Left eye: No AV nicking or papilledema. Red reflex present.  Neck:      Musculoskeletal: Normal range of motion. No neck rigidity, pain with movement or muscular tenderness.      Vascular: No JVD.      Trachea: No tracheal deviation.   Cardiovascular:      Rate and Rhythm: Normal rate and regular rhythm.      Heart sounds: Normal heart sounds. No friction rub.   Pulmonary:      Effort: No respiratory distress.      Breath sounds: Normal breath sounds. No stridor.   Abdominal:      General: There is distension.      Palpations: Abdomen is soft.      Tenderness: There is abdominal tenderness. There is no guarding or rebound.      Comments: Abdomen is distended and tender throughout.  Bowel sounds are hypoactive.  SIDDHARTHA drain is in place with adequate drainage no signs of surrounding erythema to suggest infection.  No rebound or masses.   Musculoskeletal: Normal range of motion.         General: No tenderness.   Lymphadenopathy:      Cervical: No cervical adenopathy.      Right cervical: No superficial cervical adenopathy.     Left cervical: No superficial cervical adenopathy.   Skin:     General: Skin is warm.      Capillary Refill: Capillary refill takes less than 2 seconds.      Findings: No rash.   Neurological:      General: No focal deficit present.      Mental Status: He is alert and oriented to person, place, and time.         ED Course     EKG shows normal sinus rhythm.  Left axis deviation.  Nonspecific ST and T wave abnormality heart rate is 74.    Results for  orders placed or performed during the hospital encounter of 07/11/20 (from the past 24 hour(s))   CBC with platelets differential   Result Value Ref Range    WBC 5.9 4.0 - 11.0 10e9/L    RBC Count 5.06 4.4 - 5.9 10e12/L    Hemoglobin 17.4 13.3 - 17.7 g/dL    Hematocrit 49.7 40.0 - 53.0 %    MCV 98 78 - 100 fl    MCH 34.4 (H) 26.5 - 33.0 pg    MCHC 35.0 31.5 - 36.5 g/dL    RDW 15.9 (H) 10.0 - 15.0 %    Platelet Count 152 150 - 450 10e9/L    Diff Method Automated Method     % Neutrophils 65.2 %    % Lymphocytes 11.4 %    % Monocytes 15.7 %    % Eosinophils 6.4 %    % Basophils 0.8 %    % Immature Granulocytes 0.5 %    Absolute Neutrophil 3.9 1.6 - 8.3 10e9/L    Absolute Lymphocytes 0.7 (L) 0.8 - 5.3 10e9/L    Absolute Monocytes 0.9 0.0 - 1.3 10e9/L    Absolute Eosinophils 0.4 0.0 - 0.7 10e9/L    Absolute Basophils 0.1 0.0 - 0.2 10e9/L    Abs Immature Granulocytes 0.0 0 - 0.4 10e9/L   INR   Result Value Ref Range    INR 1.27 0 - 1.3   Comprehensive metabolic panel   Result Value Ref Range    Sodium 133 (L) 134 - 144 mmol/L    Potassium 4.1 3.5 - 5.1 mmol/L    Chloride 98 98 - 107 mmol/L    Carbon Dioxide 27 21 - 31 mmol/L    Anion Gap 8 3 - 14 mmol/L    Glucose 101 70 - 105 mg/dL    Urea Nitrogen 29 (H) 7 - 25 mg/dL    Creatinine 0.96 0.70 - 1.30 mg/dL    GFR Estimate 78 >60 mL/min/[1.73_m2]    GFR Estimate If Black >90 >60 mL/min/[1.73_m2]    Calcium 9.0 8.6 - 10.3 mg/dL    Bilirubin Total 2.9 (H) 0.3 - 1.0 mg/dL    Albumin 3.7 3.5 - 5.7 g/dL    Protein Total 6.6 6.4 - 8.9 g/dL    Alkaline Phosphatase 115 (H) 34 - 104 U/L    ALT 33 7 - 52 U/L    AST 38 13 - 39 U/L   Lactic acid whole blood   Result Value Ref Range    Lactic Acid 1.7 0.7 - 2.0 mmol/L   Troponin GH   Result Value Ref Range    Troponin 7.2 <34.0 pg/mL   XR Abdomen 2 Views    Narrative    XR ABDOMEN 2 VW   7/11/2020 1:40 PM    History:Male,age  69 years, possible partial SBO    Comparison: None    FINDINGS: Two views are submitted. There are number of  gas-filled,  distended small bowel loops throughout the abdomen and pelvis. Upright  examination demonstrates no evidence of free air. Drainage catheter is  seen in the right hemiabdomen.      Impression    IMPRESSION:  Small bowel obstruction without evidence of free air..    SEPIDEH HAND MD   UA reflex to Microscopic   Result Value Ref Range    Color Urine Yellow     Appearance Urine Clear     Glucose Urine Negative NEG^Negative mg/dL    Bilirubin Urine Negative NEG^Negative    Ketones Urine 10 (A) NEG^Negative mg/dL    Specific Gravity Urine 1.031 1.003 - 1.035    Blood Urine Negative NEG^Negative    pH Urine 6.0 5.0 - 7.0 pH    Protein Albumin Urine 10 (A) NEG^Negative mg/dL    Urobilinogen mg/dL 2.0 0.0 - 2.0 mg/dL    Nitrite Urine Negative NEG^Negative    Leukocyte Esterase Urine Negative NEG^Negative    Source Midstream Urine     RBC Urine <1 0 - 2 /HPF    WBC Urine 2 0 - 5 /HPF    Mucous Urine Present (A) NEG^Negative /LPF       Medications   0.9% sodium chloride BOLUS (0 mLs Intravenous Stopped 7/11/20 1503)     Followed by   sodium chloride 0.9% infusion ( Intravenous New Bag 7/11/20 1442)   ondansetron (ZOFRAN) injection 4 mg (4 mg Intravenous Given 7/11/20 1321)   fentaNYL (PF) (SUBLIMAZE) injection 50 mcg (50 mcg Intravenous Given 7/11/20 1355)   fentaNYL (PF) (SUBLIMAZE) injection 50 mcg (50 mcg Intravenous Given 7/11/20 1417)       Assessments & Plan (with Medical Decision Making)     I have reviewed the nursing notes.    I have reviewed the findings, diagnosis, plan and need for follow up with the patient.       ED to Inpatient Handoff:    Discussed with Dr. Delgadillo, Dr. Dubois at [facility]  Patient accepted for Inpatient Stay  Pending studies include none  Code Status: Not Addressed           New Prescriptions    No medications on file       Final diagnoses:   SBO (small bowel obstruction) (H)   Abdominal distension   S/P laparoscopic cholecystectomy     Afebrile.  Vital signs stable.  Patient is 5  days status post a laparoscopic cholecystectomy performed by Dr. Ludwig Bella on 7/6/2020.  Complaining of continued decreased appetite, nausea and vomiting.  Also complaining of abdominal pain.  IV established and he was given fluids initially with Zofran and fentanyl for pain relief.  EKG shows normal sinus rhythm.  Left axis deviation.  Nonspecific ST and T wave abnormality heart rate is 74.  Troponin is normal.  CBC shows normal white blood cells and no left shift.  CMP is essentially unremarkable, other than increased alk phos at 115 and total bilirubin is 2.9.  Lactic acid is normal.  UA is unremarkable.  Blood cultures are pending.  Abdominal x-ray does show a small bowel obstruction without evidence of free air.  NG tube was placed at this time.  I discussed this case with , hospitalist as well as , general surgeon.  The patient will be admitted at this time for further medical management and evaluation.  7/11/2020   [facility] AND \[facility] [place] [facility]\""     Caleb Collazo PA-C  07/11/20 1513

## 2020-07-11 NOTE — ED NOTES
Patient states he hasnt been able to eat anything, he has had nausea and vomiting for 1.5 days.  He also has a SIDDHARTHA drain with serous drainage that he states he empties every 3-4 hours (at that time he says it's approximately 3/4 full).  He also states that he has intermittent pain that he describes as cramps 10/10 pain.  He isn't able to take any pain medications because they make him sick.  He also hasn't taken any home meds since the surgery on Monday.  Sis-in-law states he has lost 20 pounds in the past 3 weeks.

## 2020-07-11 NOTE — ED TRIAGE NOTES
Patient had his gallbladder out on Monday and has not felt good since.  Has been nauseated, painful and just not right.

## 2020-07-11 NOTE — PHARMACY-ADMISSION MEDICATION HISTORY
Pharmacy -- Admission Medication Reconciliation 24 hours/ IN PROGRESS    Prior to admission (PTA) medications were reviewed     Sources Consulted: Sure Scripts  Faxed Jenny x2 no response at time of note  Attempted to speak with patient on telephone - no answer at this time    The pharmacist will continue to gather information    Zaynab Ashley [facility], 7/11/2020,  6:19 PM

## 2020-07-12 ENCOUNTER — APPOINTMENT (OUTPATIENT)
Dept: CT IMAGING | Facility: OTHER | Age: 70
DRG: 388 | End: 2020-07-12
Attending: SURGERY
Payer: MEDICARE

## 2020-07-12 LAB
ALBUMIN FLD-MCNC: 1.1 G/DL
ALBUMIN SERPL-MCNC: 2.9 G/DL (ref 3.5–5.7)
ALP SERPL-CCNC: 100 U/L (ref 34–104)
ALT SERPL W P-5'-P-CCNC: 26 U/L (ref 7–52)
AMMONIA PLAS-SCNC: 60 UMOL/L (ref 16–53)
ANION GAP SERPL CALCULATED.3IONS-SCNC: 7 MMOL/L (ref 3–14)
AST SERPL W P-5'-P-CCNC: 31 U/L (ref 13–39)
BASOPHILS # BLD AUTO: 0 10E9/L (ref 0–0.2)
BASOPHILS NFR BLD AUTO: 0.2 %
BILIRUB DIRECT SERPL-MCNC: 0.7 MG/DL (ref 0–0.2)
BILIRUB SERPL-MCNC: 2.1 MG/DL (ref 0.3–1)
BUN SERPL-MCNC: 26 MG/DL (ref 7–25)
CALCIUM SERPL-MCNC: 7.9 MG/DL (ref 8.6–10.3)
CHLORIDE SERPL-SCNC: 104 MMOL/L (ref 98–107)
CO2 SERPL-SCNC: 26 MMOL/L (ref 21–31)
CREAT SERPL-MCNC: 0.83 MG/DL (ref 0.7–1.3)
DIFFERENTIAL METHOD BLD: ABNORMAL
EOSINOPHIL # BLD AUTO: 0.4 10E9/L (ref 0–0.7)
EOSINOPHIL NFR BLD AUTO: 9.3 %
ERYTHROCYTE [DISTWIDTH] IN BLOOD BY AUTOMATED COUNT: 15.9 % (ref 10–15)
FERRITIN SERPL-MCNC: 287 NG/ML (ref 23.9–336.2)
GFR SERPL CREATININE-BSD FRML MDRD: >90 ML/MIN/{1.73_M2}
GLUCOSE SERPL-MCNC: 81 MG/DL (ref 70–105)
HCT VFR BLD AUTO: 41.8 % (ref 40–53)
HGB BLD-MCNC: 14.8 G/DL (ref 13.3–17.7)
IMM GRANULOCYTES # BLD: 0 10E9/L (ref 0–0.4)
IMM GRANULOCYTES NFR BLD: 0.2 %
IRON SATN MFR SERPL: 36 % (ref 20–55)
IRON SERPL-MCNC: 84 UG/DL (ref 50–212)
LIPASE FLD-CCNC: 36 U/L
LYMPHOCYTES # BLD AUTO: 0.7 10E9/L (ref 0.8–5.3)
LYMPHOCYTES NFR BLD AUTO: 16.7 %
MCH RBC QN AUTO: 35.2 PG (ref 26.5–33)
MCHC RBC AUTO-ENTMCNC: 35.4 G/DL (ref 31.5–36.5)
MCV RBC AUTO: 99 FL (ref 78–100)
MONOCYTES # BLD AUTO: 0.6 10E9/L (ref 0–1.3)
MONOCYTES NFR BLD AUTO: 15.7 %
NEUTROPHILS # BLD AUTO: 2.4 10E9/L (ref 1.6–8.3)
NEUTROPHILS NFR BLD AUTO: 57.9 %
PLATELET # BLD AUTO: 119 10E9/L (ref 150–450)
POTASSIUM SERPL-SCNC: 4.1 MMOL/L (ref 3.5–5.1)
PROT SERPL-MCNC: 5 G/DL (ref 6.4–8.9)
RBC # BLD AUTO: 4.21 10E12/L (ref 4.4–5.9)
SODIUM SERPL-SCNC: 137 MMOL/L (ref 134–144)
SPECIMEN SOURCE FLD: NORMAL
SPECIMEN SOURCE FLD: NORMAL
TIBC SERPL-MCNC: 235.2 UG/DL (ref 245–400)
UIBC (UNSATURATED): 151.2 MG/DL
WBC # BLD AUTO: 4.1 10E9/L (ref 4–11)

## 2020-07-12 PROCEDURE — 82248 BILIRUBIN DIRECT: CPT | Performed by: INTERNAL MEDICINE

## 2020-07-12 PROCEDURE — 86706 HEP B SURFACE ANTIBODY: CPT | Performed by: INTERNAL MEDICINE

## 2020-07-12 PROCEDURE — 74177 CT ABD & PELVIS W/CONTRAST: CPT

## 2020-07-12 PROCEDURE — 83876 ASSAY MYELOPEROXIDASE: CPT | Performed by: INTERNAL MEDICINE

## 2020-07-12 PROCEDURE — 86376 MICROSOMAL ANTIBODY EACH: CPT | Performed by: INTERNAL MEDICINE

## 2020-07-12 PROCEDURE — 86704 HEP B CORE ANTIBODY TOTAL: CPT | Performed by: INTERNAL MEDICINE

## 2020-07-12 PROCEDURE — 82140 ASSAY OF AMMONIA: CPT | Performed by: INTERNAL MEDICINE

## 2020-07-12 PROCEDURE — 83550 IRON BINDING TEST: CPT | Performed by: INTERNAL MEDICINE

## 2020-07-12 PROCEDURE — 83516 IMMUNOASSAY NONANTIBODY: CPT | Performed by: INTERNAL MEDICINE

## 2020-07-12 PROCEDURE — 83516 IMMUNOASSAY NONANTIBODY: CPT | Mod: 91 | Performed by: INTERNAL MEDICINE

## 2020-07-12 PROCEDURE — 86705 HEP B CORE ANTIBODY IGM: CPT | Performed by: INTERNAL MEDICINE

## 2020-07-12 PROCEDURE — G0499 HEPB SCREEN HIGH RISK INDIV: HCPCS | Performed by: INTERNAL MEDICINE

## 2020-07-12 PROCEDURE — 25500064 ZZH RX 255 OP 636: Performed by: INTERNAL MEDICINE

## 2020-07-12 PROCEDURE — 86255 FLUORESCENT ANTIBODY SCREEN: CPT | Performed by: INTERNAL MEDICINE

## 2020-07-12 PROCEDURE — 99233 SBSQ HOSP IP/OBS HIGH 50: CPT | Performed by: INTERNAL MEDICINE

## 2020-07-12 PROCEDURE — 86038 ANTINUCLEAR ANTIBODIES: CPT | Performed by: INTERNAL MEDICINE

## 2020-07-12 PROCEDURE — G0472 HEP C SCREEN HIGH RISK/OTHER: HCPCS | Performed by: INTERNAL MEDICINE

## 2020-07-12 PROCEDURE — 25000128 H RX IP 250 OP 636: Performed by: INTERNAL MEDICINE

## 2020-07-12 PROCEDURE — 80053 COMPREHEN METABOLIC PANEL: CPT | Performed by: INTERNAL MEDICINE

## 2020-07-12 PROCEDURE — 99024 POSTOP FOLLOW-UP VISIT: CPT | Performed by: SURGERY

## 2020-07-12 PROCEDURE — 86708 HEPATITIS A ANTIBODY: CPT | Performed by: INTERNAL MEDICINE

## 2020-07-12 PROCEDURE — 25800030 ZZH RX IP 258 OP 636: Performed by: INTERNAL MEDICINE

## 2020-07-12 PROCEDURE — 12000000 ZZH R&B MED SURG/OB

## 2020-07-12 PROCEDURE — 85025 COMPLETE CBC W/AUTO DIFF WBC: CPT | Performed by: INTERNAL MEDICINE

## 2020-07-12 PROCEDURE — 82390 ASSAY OF CERULOPLASMIN: CPT | Performed by: INTERNAL MEDICINE

## 2020-07-12 PROCEDURE — 83540 ASSAY OF IRON: CPT | Performed by: INTERNAL MEDICINE

## 2020-07-12 PROCEDURE — 36415 COLL VENOUS BLD VENIPUNCTURE: CPT | Performed by: INTERNAL MEDICINE

## 2020-07-12 PROCEDURE — 82728 ASSAY OF FERRITIN: CPT | Performed by: INTERNAL MEDICINE

## 2020-07-12 RX ORDER — METHYLPREDNISOLONE SODIUM SUCCINATE 40 MG/ML
32 INJECTION, POWDER, LYOPHILIZED, FOR SOLUTION INTRAMUSCULAR; INTRAVENOUS ONCE
Status: COMPLETED | OUTPATIENT
Start: 2020-07-12 | End: 2020-07-12

## 2020-07-12 RX ORDER — CEFTRIAXONE SODIUM 2 G/50ML
2 INJECTION, SOLUTION INTRAVENOUS EVERY 24 HOURS
Status: DISCONTINUED | OUTPATIENT
Start: 2020-07-12 | End: 2020-07-13

## 2020-07-12 RX ORDER — DIPHENHYDRAMINE HYDROCHLORIDE 50 MG/ML
50 INJECTION INTRAMUSCULAR; INTRAVENOUS ONCE
Status: COMPLETED | OUTPATIENT
Start: 2020-07-12 | End: 2020-07-12

## 2020-07-12 RX ORDER — IODIXANOL 320 MG/ML
100 INJECTION, SOLUTION INTRAVASCULAR ONCE
Status: COMPLETED | OUTPATIENT
Start: 2020-07-12 | End: 2020-07-12

## 2020-07-12 RX ORDER — LACTULOSE 20 G/30ML
20 SOLUTION ORAL 2 TIMES DAILY
Status: DISCONTINUED | OUTPATIENT
Start: 2020-07-13 | End: 2020-07-15 | Stop reason: HOSPADM

## 2020-07-12 RX ADMIN — HYDROMORPHONE HYDROCHLORIDE 0.5 MG: 1 INJECTION, SOLUTION INTRAMUSCULAR; INTRAVENOUS; SUBCUTANEOUS at 20:00

## 2020-07-12 RX ADMIN — HYDROMORPHONE HYDROCHLORIDE 0.5 MG: 1 INJECTION, SOLUTION INTRAMUSCULAR; INTRAVENOUS; SUBCUTANEOUS at 06:09

## 2020-07-12 RX ADMIN — SODIUM CHLORIDE, POTASSIUM CHLORIDE, SODIUM LACTATE AND CALCIUM CHLORIDE: 600; 310; 30; 20 INJECTION, SOLUTION INTRAVENOUS at 12:46

## 2020-07-12 RX ADMIN — SODIUM CHLORIDE, POTASSIUM CHLORIDE, SODIUM LACTATE AND CALCIUM CHLORIDE: 600; 310; 30; 20 INJECTION, SOLUTION INTRAVENOUS at 01:42

## 2020-07-12 RX ADMIN — DIPHENHYDRAMINE HYDROCHLORIDE 50 MG: 50 INJECTION INTRAMUSCULAR; INTRAVENOUS at 08:46

## 2020-07-12 RX ADMIN — ONDANSETRON HYDROCHLORIDE 4 MG: 2 SOLUTION INTRAMUSCULAR; INTRAVENOUS at 06:10

## 2020-07-12 RX ADMIN — METHYLPREDNISOLONE SODIUM SUCCINATE 32 MG: 40 INJECTION, POWDER, FOR SOLUTION INTRAMUSCULAR; INTRAVENOUS at 06:21

## 2020-07-12 RX ADMIN — ONDANSETRON HYDROCHLORIDE 4 MG: 2 SOLUTION INTRAMUSCULAR; INTRAVENOUS at 12:55

## 2020-07-12 RX ADMIN — HYDROMORPHONE HYDROCHLORIDE 0.5 MG: 1 INJECTION, SOLUTION INTRAMUSCULAR; INTRAVENOUS; SUBCUTANEOUS at 12:41

## 2020-07-12 RX ADMIN — CEFTRIAXONE SODIUM 2 G: 2 INJECTION, SOLUTION INTRAVENOUS at 15:49

## 2020-07-12 RX ADMIN — IODIXANOL 100 ML: 320 INJECTION, SOLUTION INTRAVASCULAR at 07:25

## 2020-07-12 ASSESSMENT — ACTIVITIES OF DAILY LIVING (ADL)
ADLS_ACUITY_SCORE: 12

## 2020-07-12 ASSESSMENT — MIFFLIN-ST. JEOR: SCORE: 1526.97

## 2020-07-12 NOTE — PROGRESS NOTES
Brought oral contrast down to Medical. Nurse will administer through NG tube. Pt will be given last pre-medication at 8:15 and scan will be at 9:15.  Aleyda Rushing on 7/12/2020 at 7:22 AM

## 2020-07-12 NOTE — PROGRESS NOTES
"Surgical Progress Note     POD# 6 s/p laparoscopic cholecystectomy    Subjective: Feeling better. Still nauseous after pain meds . Passed some stool.    Objective:  /73   Pulse 68   Temp 97.1  F (36.2  C) (Tympanic)   Resp 18   Ht 1.676 m (5' 6\")   Wt 80.9 kg (178 lb 6.4 oz)   SpO2 95%   BMI 28.79 kg/m        Drains: 405 Ml yesterday, clear straw colored  N mL last shift bilious      ABDOMEN: Incisions without erythema. Soft and non-tender.     LABS  Recent Labs   Lab Test 20  0500 20  1322  18  1657 17  1652   WBC 4.1 5.9   < >  --   --    RBC 4.21* 5.06   < >  --   --    HGB 14.8 17.4   < > 11.4* 11.5*   HCT 41.8 49.7   < > 37.5 37.2   MCV 99 98   < > 82 85   MCH 35.2* 34.4*   < > 24.9* 26.1   MCHC 35.4 35.0   < > 30.4* 30.9*   * 152   < > 208 225   MPV  --   --   --  12.0* 12.2*    < > = values in this interval not displayed.       Recent Labs   Lab Test 20  0500 20  1322   POTASSIUM 4.1 4.1   CHLORIDE 104 98   BUN 26* 29*       Recent Labs   Lab Test 20  0500 20  1405 20  1322   PROTEIN  --  10*  --    BILITOTAL 2.1*  --  2.9*   AST 31  --  38   ALT 26  --  33         ASSESSMENT  Stable. Bili is mostly unconjugated. Minimal NG output. Nausea likely from  narcotics. Drainage looks like ascites.     PLAN  CT today      Brandon Dubois MD      "

## 2020-07-12 NOTE — PLAN OF CARE
"Pt testing will be delayed about 2 hrs. He is afebrile, A &O. Spoke with him regarding abdominal testing to see if their is movement through the bowel and about the premedication for the iodine allergy. He verbalizes his understanding. LCTAB, HRRR. Abdomen tender, SIDDHARTHA is putting out large amts of yellow fluid and leaking around drain tubing, dressing changed.   /72   Pulse 78   Temp 98.2  F (36.8  C) (Tympanic)   Resp 18   Ht 1.676 m (5' 6\")   Wt 80.9 kg (178 lb 6.4 oz)   SpO2 98%   BMI 28.79 kg/m      "

## 2020-07-12 NOTE — PHARMACY-ADMISSION MEDICATION HISTORY
Pharmacy -- Admission Medication Reconciliation    Prior to admission (PTA) medications were reviewed and the patient's PTA medication list was updated.    Sources Consulted: Sure Scripts, chart review, patient on telephone    The reliability of this Medication Reconciliation is: Reliability: Reliable    The following significant changes were made:  Updated last doses per patient  Added directions to vitamin c    Left azathioprine on med list, but he was he was just told to STOP taking it    In addition, the patient's allergies were reviewed with the patient and confirmed as follows:   Allergies: Dye [contrast dye]; Iodine; and Shrimp    The pharmacist has reviewed with the patient that all personal medications should be removed from the building or locked in the belongings safe.  Patient shall only take medications ordered by the physician and administered by the nursing staff.       Medication barriers identified: did not get vitamin b12 injection, over due   Medication adherence concerns: see above   Understanding of emergency medications: n/a    Zaynab Ashley [facility], 7/12/2020,  5:57 PM

## 2020-07-12 NOTE — PROGRESS NOTES
RN called Dr. Dubois and told him patient has contrast dye allergy.  Dr. Dubois wants to proceed with CT Abdomen Pelvis w Contrast.  Patient will be pre medicated. Mattie Clifford on 7/12/2020 at 6:01 AM

## 2020-07-12 NOTE — PROGRESS NOTES
1.  Has the patient had a previous reaction to IV contrast? Yes, pt being pre-medicated    2.  Does the patient have kidney disease? no    3.  Is the patient on dialysis? no    If YES to any of these questions, exam will be reviewed with a Radiologist before administering contrast.    IV Contrast- Discharge Instructions After Your CT Scan      The IV contrast you received today will be filtered from your bloodstream by your kidneys during the next 24 hours and pass from the body in urine.  You will not be aware of this process and your urine will not change in color.  To help this process you should drink at least 4 additional glasses of water or juice today.  This reduces stress on your kidneys.    Most contrast reactions are immediate.  Should you develop symptoms of concern after discharge, contact the department at the number below.  After hours you should contact your personal physician.  If you develop breathing distress or wheezing, call 911.

## 2020-07-12 NOTE — PROGRESS NOTES
Spoke with Dr. Dubois he states wants pt to have both oral and IV. Radiology to order. Radiology tech stated they do have protocol, no Rad available to give orders in time for testing. Called on Call Doc and received orders for 32mg medral Iv 2hrs prior and 50 mg Benadryl IV 1 Hour prior.

## 2020-07-12 NOTE — PROGRESS NOTES
[facility]    Medicine Progress Note - Hospitalist Service       Date of Admission:  7/11/2020  Assessment & Plan   Phi Tabares is a 69 year old male admitted on 7/11/2020.     Nausea and vomiting postoperatively: Ileus vs small bowel obstruction.  Patient had laparoscopic cholecystectomy on 7/6/2020, with postop course complicated with difficult extubation.  Intraoperative report reviewed- noted intra-abdominal adhesions from previous surgeries, and new findings of liver cirrhosis with ascites.  History of previous intra-abdominal surgeries: Partial colon resection due to Crohn's disease.  Abdominal x-ray-CT abdomen suggestive of SBO, no free air or abdominal abscess.  Normal lactate, WBCs.  Abdominal fluid cell count not suggestive of infection.  States history of frequent SBO's, likely due to Crohn's exacerbation, requiring prolonged steroid therapy.  He says since started on Imuran, no recurrence of SBO's.  NGT was placed in ED, initially suctioned 100 mL's of clear fluid when very small NGT output.  N.p.o., IVF, IV Protonix, IV Dilaudid for pain.  Surgery following, discussed with Dr. Dubois.    History of Crohn's disease, s/p partial bowel resection.  States no exacerbations for long time.  On Imuran.  D/w Dr. Cole, [facility] GI-, and holding Imuran, given the operative finding of liver cirrhosis, and antibacterial coverage, and patient has ascites with SIDDHARTHA in place.  I have ordered ceftriaxone 2 g daily, first dose now.  Follow-up with patient GI will be arranged by [facility] GI clinic.    Liver cirrhosis with ascites, per operative report on 7/6/2020.  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  Lab work-up for liver cirrhosis including acute hepatitis panel, anti-smooth muscle antibody, ANCA, ceruloplasmin, ferritin-results pending  Added lactulose, first on 7/13, given elevated ammonia at 60  Follows with Dr. Rodney Ponce, GI.  Discussed with [facility]  GI as above.     Diet: NPO for Medical/Clinical Reasons Except for: Ice Chips    DVT Prophylaxis: SCD  Hogan Catheter: not present  Code Status: Full Code           Disposition Plan   Expected discharge: 2 - 3 days, recommended to prior living arrangement once adequate pain management/ tolerating PO medications.  Entered: Marilu Delgadillo MD 07/12/2020, 1:48 PM       The patient's care was discussed with the Bedside Nurse and Patient.    Marilu Delgadillo MD  Hospitalist Service  [facility]    ______________________________________________________________________    Interval History   Patient remains afebrile.  Abdominal pain slightly subsided.  Had small bowel movement earlier today and passing flatus.  Small NGT output.  SIDDHARTHA bulb filled with clear yellow straw fluid, likely ascitic fluid.  Fluid labs from 7/11 not consistent with peritonitis.  CT abdomen obtained, consistent with small bowel obstruction, no evidence of postop intra-abdominal abscess.  Liver cirrhosis labs pending.  Patient denies chest pain, dyspnea, cough.    Data reviewed today: I reviewed all medications, new labs and imaging results over the last 24 hours.     Physical Exam   Vital Signs: Temp: 97.1  F (36.2  C) Temp src: Tympanic BP: 109/73 Pulse: 68   Resp: 18 SpO2: 95 % O2 Device: None (Room air)    Weight: 178 lbs 6.4 oz  General: Alert and oriented #3.  No apparent distress.  HEENT: Pupils equal reactive to light and accommodation, no scleral icterus.  No thyromegaly or cervical lymphadenopathy.  Lungs: Clear to auscultation bilaterally, good air movement, no rhonchi or wheezes.  Cardiovascular: Rhythmic and regular S1-S2, no murmurs.  Abdomen: Semi-distended, surgical incisions healing.  Old midline surgical scar.  SIDDHARTHA in place, draining clear straw yellow fluid.  No peripheral edema.  Skin: No rash or lesions, except surgical scars as above.    Data   Recent Results (from the past 24 hour(s))   XR Abdomen Port 1 View     Narrative    XR ABDOMEN PORT 1 VW, 7/11/2020 3:18 PM    History: Male, age 69 years; NG tube placement confirmation    Comparison: 7/11/2020    Technique: Single view .    FINDINGS: Nasogastric tube is now seen extending into the left upper  quadrants. The tip is in the expected location of the stomach.  Sidehole is not well seen. Lung bases are clear. Bowel gas pattern is  similar as is a right upper quadrant drain.      Impression    IMPRESSION:   Nasogastric tube tip appears to lie in the expected location of the  stomach. Sidehole is not well seen however may lie at the GE junction.    SEPIDEH HAND MD   CT Abdomen pelvis w contrast*    Narrative    CT ABDOMEN PELVIS W CONTRAST    CLINICAL HISTORY: Male, age 69 years,  Nausea, vomiting; Abd pain,  acute, generalized, with fever, post-op; with oral contrast as well;    Comparison:  MRI abdomen 6/11/2020    TECHNIQUE:  CT was performed of the abdomen and pelvis with IV and  oral contrast. Sagittal, coronal and axial reconstructions were  reviewed.     FINDINGS:    Abdomen/Pelvis CT:  Lung Bases:  Peripheral areas of atelectasis throughout the lung  bases, most evident along the posterior medial aspect of the right  lower lobe.    Esophagus/stomach: Nasogastric tube is satisfactorily positioned.    Liver:  Intrahepatic biliary dilatation is similar dating back to the  6/11/2020 MRI.    Portal venous system: Patent.  Gallbladder: Surgically absent. Small volume of fluid is seen at the  gallbladder fossa.    Spleen: Unchanged splenomegaly.    Pancreas: Normal.    Adrenal Glands: Normal.    Kidneys: 8 mm cortical cyst again seen posteriorly within the right  kidney.    Ureters: Normal.  Urinary bladder: Normal.    Abdominal Aorta: Scattered atherosclerotic calcifications.  IVC: Normal.    Lymph Nodes: Normal.    Large and Small Bowel: Mild fat stranding adjacent to the duodenum,  likely residual from recent surgery. Small volume of fluid lying  adjacent to the  duodenum as well as within the gallbladder fossa.     Postoperative changes suggest right hemicolectomy. There are number  of contrast filled, distended loops of small bowel. Nondistended loops  of small bowel are also seen without contrast.    Pelvic Organs:  Normal.  Peritoneum: Drainage catheter is seen in the right upper quadrants,  lying along the superficial aspects of the liver adjacent to loop of  colon. There is minimal volume of fluid seen along the dome of liver  adjacent to the catheter. No evidence of apparent abscess.    Bony structures: No acute abnormality. Moderate degenerative change of  the lumbar spine.    Other Findings:  Inguinal lymph nodes are normal.      Impression    IMPRESSION:   Postoperative changes consistent with cholecystectomy with small  volume of fluid in the gallbladder fossa and adjacent to the duodenum  as well as along the dome the liver. No evidence of apparent abscess.    Distended and nondistended loops of small bowel raise concern for the  possibility of obstruction as the distended loops are filled with  contrast and a nondistended loops have no contrast.     Bibasilar atelectasis with focal consolidation/pneumonia in the  posterior medial aspect of the right lower lobe.    SEPIDEH HAND MD

## 2020-07-13 PROBLEM — K70.31 ALCOHOLIC CIRRHOSIS OF LIVER WITH ASCITES (H): Status: RESOLVED | Noted: 2020-07-11 | Resolved: 2020-07-13

## 2020-07-13 PROBLEM — K74.60 CIRRHOSIS OF LIVER (H): Status: ACTIVE | Noted: 2020-07-13

## 2020-07-13 LAB
ALBUMIN SERPL-MCNC: 2.9 G/DL (ref 3.5–5.7)
ALP SERPL-CCNC: 99 U/L (ref 34–104)
ALT SERPL W P-5'-P-CCNC: 26 U/L (ref 7–52)
ANION GAP SERPL CALCULATED.3IONS-SCNC: 7 MMOL/L (ref 3–14)
AST SERPL W P-5'-P-CCNC: 31 U/L (ref 13–39)
BASOPHILS # BLD AUTO: 0 10E9/L (ref 0–0.2)
BASOPHILS NFR BLD AUTO: 0.5 %
BILIRUB SERPL-MCNC: 1.6 MG/DL (ref 0.3–1)
BUN SERPL-MCNC: 21 MG/DL (ref 7–25)
CALCIUM SERPL-MCNC: 8 MG/DL (ref 8.6–10.3)
CERULOPLASMIN SERPL-MCNC: 23 MG/DL (ref 20–60)
CHLORIDE SERPL-SCNC: 101 MMOL/L (ref 98–107)
CO2 SERPL-SCNC: 28 MMOL/L (ref 21–31)
CREAT SERPL-MCNC: 0.93 MG/DL (ref 0.7–1.3)
DIFFERENTIAL METHOD BLD: ABNORMAL
EOSINOPHIL # BLD AUTO: 0.3 10E9/L (ref 0–0.7)
EOSINOPHIL NFR BLD AUTO: 4.4 %
ERYTHROCYTE [DISTWIDTH] IN BLOOD BY AUTOMATED COUNT: 15.7 % (ref 10–15)
GFR SERPL CREATININE-BSD FRML MDRD: 81 ML/MIN/{1.73_M2}
GLUCOSE SERPL-MCNC: 84 MG/DL (ref 70–105)
GRAM STN SPEC: NORMAL
GRAM STN SPEC: NORMAL
HAV IGG SER QL IA: NONREACTIVE
HBV CORE AB SERPL QL IA: NONREACTIVE
HBV CORE IGM SERPL QL IA: NONREACTIVE
HBV SURFACE AB SERPL IA-ACNC: 0 M[IU]/ML
HBV SURFACE AG SERPL QL IA: NONREACTIVE
HCT VFR BLD AUTO: 41.6 % (ref 40–53)
HCV AB SERPL QL IA: NONREACTIVE
HGB BLD-MCNC: 14.7 G/DL (ref 13.3–17.7)
IMM GRANULOCYTES # BLD: 0 10E9/L (ref 0–0.4)
IMM GRANULOCYTES NFR BLD: 0.5 %
INTERPRETATION ECG - MUSE: NORMAL
LYMPHOCYTES # BLD AUTO: 0.6 10E9/L (ref 0.8–5.3)
LYMPHOCYTES NFR BLD AUTO: 9.1 %
MCH RBC QN AUTO: 35 PG (ref 26.5–33)
MCHC RBC AUTO-ENTMCNC: 35.3 G/DL (ref 31.5–36.5)
MCV RBC AUTO: 99 FL (ref 78–100)
MONOCYTES # BLD AUTO: 0.8 10E9/L (ref 0–1.3)
MONOCYTES NFR BLD AUTO: 13.5 %
MYELOPEROXIDASE AB SER-ACNC: <0.2 AI (ref 0–0.9)
NEUTROPHILS # BLD AUTO: 4.4 10E9/L (ref 1.6–8.3)
NEUTROPHILS NFR BLD AUTO: 72 %
PLATELET # BLD AUTO: 120 10E9/L (ref 150–450)
POTASSIUM SERPL-SCNC: 4.5 MMOL/L (ref 3.5–5.1)
PROT SERPL-MCNC: 5 G/DL (ref 6.4–8.9)
PROTEINASE3 IGG SER-ACNC: <0.2 AI (ref 0–0.9)
RBC # BLD AUTO: 4.2 10E12/L (ref 4.4–5.9)
SODIUM SERPL-SCNC: 136 MMOL/L (ref 134–144)
SPECIMEN SOURCE: NORMAL
WBC # BLD AUTO: 6.2 10E9/L (ref 4–11)

## 2020-07-13 PROCEDURE — 25000128 H RX IP 250 OP 636: Performed by: INTERNAL MEDICINE

## 2020-07-13 PROCEDURE — 25000132 ZZH RX MED GY IP 250 OP 250 PS 637: Mod: GY | Performed by: SURGERY

## 2020-07-13 PROCEDURE — 25000128 H RX IP 250 OP 636: Performed by: SURGERY

## 2020-07-13 PROCEDURE — 25000132 ZZH RX MED GY IP 250 OP 250 PS 637: Mod: GY | Performed by: INTERNAL MEDICINE

## 2020-07-13 PROCEDURE — 12000000 ZZH R&B MED SURG/OB

## 2020-07-13 PROCEDURE — 25800030 ZZH RX IP 258 OP 636: Performed by: INTERNAL MEDICINE

## 2020-07-13 PROCEDURE — 36415 COLL VENOUS BLD VENIPUNCTURE: CPT | Performed by: INTERNAL MEDICINE

## 2020-07-13 PROCEDURE — 80053 COMPREHEN METABOLIC PANEL: CPT | Performed by: INTERNAL MEDICINE

## 2020-07-13 PROCEDURE — 85025 COMPLETE CBC W/AUTO DIFF WBC: CPT | Performed by: INTERNAL MEDICINE

## 2020-07-13 PROCEDURE — 99233 SBSQ HOSP IP/OBS HIGH 50: CPT | Performed by: INTERNAL MEDICINE

## 2020-07-13 PROCEDURE — 99024 POSTOP FOLLOW-UP VISIT: CPT | Performed by: SURGERY

## 2020-07-13 RX ORDER — IBUPROFEN 600 MG/1
600 TABLET, FILM COATED ORAL EVERY 6 HOURS PRN
Status: DISCONTINUED | OUTPATIENT
Start: 2020-07-13 | End: 2020-07-15 | Stop reason: HOSPADM

## 2020-07-13 RX ORDER — PANTOPRAZOLE SODIUM 40 MG/1
40 TABLET, DELAYED RELEASE ORAL
Status: DISCONTINUED | OUTPATIENT
Start: 2020-07-13 | End: 2020-07-15 | Stop reason: HOSPADM

## 2020-07-13 RX ORDER — ACETAMINOPHEN 325 MG/1
650 TABLET ORAL EVERY 6 HOURS PRN
Status: DISCONTINUED | OUTPATIENT
Start: 2020-07-13 | End: 2020-07-15 | Stop reason: HOSPADM

## 2020-07-13 RX ORDER — OXYCODONE HYDROCHLORIDE 5 MG/1
5 TABLET ORAL EVERY 4 HOURS PRN
Status: DISCONTINUED | OUTPATIENT
Start: 2020-07-13 | End: 2020-07-15 | Stop reason: HOSPADM

## 2020-07-13 RX ADMIN — OXYCODONE HYDROCHLORIDE 5 MG: 5 TABLET ORAL at 13:02

## 2020-07-13 RX ADMIN — HYDROMORPHONE HYDROCHLORIDE 0.5 MG: 1 INJECTION, SOLUTION INTRAMUSCULAR; INTRAVENOUS; SUBCUTANEOUS at 04:27

## 2020-07-13 RX ADMIN — ENOXAPARIN SODIUM 40 MG: 40 INJECTION SUBCUTANEOUS at 10:36

## 2020-07-13 RX ADMIN — LACTULOSE 20 G: 20 SOLUTION ORAL at 20:34

## 2020-07-13 RX ADMIN — SODIUM CHLORIDE, POTASSIUM CHLORIDE, SODIUM LACTATE AND CALCIUM CHLORIDE: 600; 310; 30; 20 INJECTION, SOLUTION INTRAVENOUS at 01:50

## 2020-07-13 RX ADMIN — LACTULOSE 20 G: 20 SOLUTION ORAL at 10:36

## 2020-07-13 RX ADMIN — IBUPROFEN 600 MG: 600 TABLET ORAL at 20:33

## 2020-07-13 RX ADMIN — PANTOPRAZOLE SODIUM 40 MG: 40 TABLET, DELAYED RELEASE ORAL at 07:58

## 2020-07-13 ASSESSMENT — ACTIVITIES OF DAILY LIVING (ADL)
ADLS_ACUITY_SCORE: 12

## 2020-07-13 ASSESSMENT — MIFFLIN-ST. JEOR: SCORE: 1538.76

## 2020-07-13 NOTE — PLAN OF CARE
"Pt is medicated for pain 6/10 with dilaudid 0.5mg IV as this is all he has ordered. Pt states he did not sleep well and wonders if he can have something to help him sleep tonight. Pt hand held call light not working, pt educated about call light on side rail that did work and repair request filled out. Pt is A&O, steady on his feet and does well with tubes and lines, may be independent. Pt still has clear yellow fluid leaking around SIDDHARTHA, dressing changed time 4 this shift. SIDDHARTHA continues to put out moderate amt. BS have increased. Pt had lg loose BM this shift, states this was a normal BM for him. NG with minimal out put. Continue to monitor.  /77   Pulse 77   Temp 98.2  F (36.8  C) (Tympanic)   Resp 16   Ht 1.676 m (5' 6\")   Wt 82.1 kg (181 lb)   SpO2 96%   BMI 29.21 kg/m      "

## 2020-07-13 NOTE — PROGRESS NOTES
"Surgical Progress Note     POD# 7 s/p laparoscopic cholecystectomy    Subjective: Passing loose stool. Occasional cramps.     Objective:  /77   Pulse 77   Temp 98.2  F (36.8  C) (Tympanic)   Resp 16   Ht 1.676 m (5' 6\")   Wt 82.1 kg (181 lb)   SpO2 96%   BMI 29.21 kg/m      U/O: 350 mL last shift  Drains: 210 mL clear straw colored  N mL last shift      ABDOMEN: soft and non-tender. Incisions without erythema    LABS  Recent Labs   Lab Test 20  0412 20  0500  18  1657 17  1652   WBC 6.2 4.1   < >  --   --    RBC 4.20* 4.21*   < >  --   --    HGB 14.7 14.8   < > 11.4* 11.5*   HCT 41.6 41.8   < > 37.5 37.2   MCV 99 99   < > 82 85   MCH 35.0* 35.2*   < > 24.9* 26.1   MCHC 35.3 35.4   < > 30.4* 30.9*   * 119*   < > 208 225   MPV  --   --   --  12.0* 12.2*    < > = values in this interval not displayed.       Recent Labs   Lab Test 20  0500   POTASSIUM 4.5 4.1   CHLORIDE 101 104   BUN 21 26*       Recent Labs   Lab Test 202 20  0500 20  1405   PROTEIN  --   --  10*   BILITOTAL 1.6* 2.1*  --    AST 31 31  --    ALT 26 26  --        IMAGING  I personally reviewed patient's CT scan which shows post-operative changes and some mildly dilated proximal small bowel and collapse distal small bowel.    ASSESSMENT  Partial SBO improving.    PLAN  Remove NGT and drain  Pain: oral  Nutrition: clears  Ulcer prophylaxis: Protonix  DVT prophylaxis : Lovenox and SCD's      Brandon Dubois MD      "

## 2020-07-13 NOTE — PLAN OF CARE
"Pt requested pain medication, given per order and request. Afebrile, A&O. Ambulated to bathroom with minimal assist for drains and lines only. Encouraged ambulation. Pt had lg loose stool. Lennox drain continues to leak (clear yellow fluid) around insertion site with multiple dressing changes.NG 53 cm at nares to low continuous suction with minimal out put. Placement verified and NG flushed. BS more active tonight. Surgical sites without S or S of infection. LCTAB, HRRR.  /75   Pulse 80   Temp 98.6  F (37  C) (Tympanic)   Resp 16   Ht 1.676 m (5' 6\")   Wt 80.9 kg (178 lb 6.4 oz)   SpO2 96%   BMI 28.79 kg/m      Problem: Adult Inpatient Plan of Care  Goal: Plan of Care Review  7/12/2020 2301 by Jailene Dailey RN  Outcome: Improving  7/12/2020 1605 by Aracelis Bucio RN  Outcome: Improving  Goal: Patient-Specific Goal (Individualization)  7/12/2020 2301 by Jailene Dailey RN  Outcome: Improving  7/12/2020 1605 by Aracelis Bucio RN  Outcome: Improving  Goal: Absence of Hospital-Acquired Illness or Injury  7/12/2020 2301 by Jailene Dailey RN  Outcome: Improving  7/12/2020 1605 by Aracelis Bucio RN  Outcome: Improving  Goal: Optimal Comfort and Wellbeing  7/12/2020 2301 by Jailene Dailey RN  Outcome: Improving  7/12/2020 1605 by Aracelis Bucio RN  Outcome: Improving  Goal: Readiness for Transition of Care  7/12/2020 2301 by Jailene Dailey RN  Outcome: Improving  7/12/2020 1605 by Aracelis Bucio RN  Outcome: Improving  Goal: Rounds/Family Conference  7/12/2020 2301 by Jailene Dailey RN  Outcome: Improving  7/12/2020 1605 by Aracelis Bucio RN  Outcome: Improving     Problem: Infection  Goal: Infection Symptom Resolution  7/12/2020 2301 by Jailene Dailey RN  Outcome: Improving  7/12/2020 1605 by Aracelis Bucio RN  Outcome: Improving     Problem: Pain Acute  Goal: Optimal Pain Control  7/12/2020 2301 by Jailene Dailey, RN  Outcome: Improving  7/12/2020 1605 by Aracelis Bucio, RN  Outcome: " Improving

## 2020-07-13 NOTE — PLAN OF CARE
"NG pulled around noon. Pt is tolerating a clear liquid diet without issues. Pt is experiencing intermittent cramping and PRN Candice. Pt had a decreased in pain and has denied pain.  Incision sites are C/D/I. SIDDHARTHA pulled and SIDDHARTHA dressing site has been changed x2, very large amounts of yellow drainage noted. /77   Pulse 74   Temp 97.6  F (36.4  C) (Tympanic)   Resp 18   Ht 1.676 m (5' 6\")   Wt 82.1 kg (181 lb)   SpO2 97%   BMI 29.21 kg/m     "

## 2020-07-13 NOTE — PROGRESS NOTES
SAFETY CHECKLIST  ID Bands and Risk clasps correct and in place (DNR, Fall risk, Allergy, Latex, Limb):  Yes  All Lines Reconciled and labeled correctly: Yes  Whiteboard updated:Yes  Environmental interventions (bed/chair alarm on, call light, side rails, restraints, sitter....): Yes  Verify Tele #: NA

## 2020-07-13 NOTE — PROGRESS NOTES
[facility]    Hospitalist Progress Note      Assessment & Plan   Phi Tabares is a 69 year old male who was admitted on 7/11/2020.     Principal Problem:    SBO (small bowel obstruction) (H)    Assessment: resolving. Stool output, no nausea. Tolerating clear liquids.    Plan: remove NG.      Postoperative state    Assessment: postoperative day # 7 cholecystectomy    Plan: management per Dr. Dubois    Active Problems:    Crohn's disease with complication (H)    Assessment: daily imuran, which will be held per Dr. Delgadillo discussion with GI     Plan: arrange follow up with [facility] when discharged    Cirrhosis  Assessment: seen intraoperatively, SIDDHARTHA drain collecting ascites.   Plan: further management per GI      DVT Prophylaxis: Pneumatic Compression Devices  Code Status: Full Code    Dallas Whitehead    Interval History   Feels well. No fevers, chills. No nausea, vomiting. Stool overnight. No dyspnea.     -Data reviewed today: I reviewed all new labs and imaging results over the last 24 hours. I personally reviewed no images or EKG's today.    Physical Exam   Temp: 96.8  F (36  C) Temp src: Tympanic BP: 114/69 Pulse: 79   Resp: 16 SpO2: 94 % O2 Device: None (Room air)    Vitals:    07/11/20 1204 07/12/20 0550 07/13/20 0400   Weight: 81.6 kg (180 lb) 80.9 kg (178 lb 6.4 oz) 82.1 kg (181 lb)     Vital Signs with Ranges  Temp:  [96.8  F (36  C)-98.8  F (37.1  C)] 96.8  F (36  C)  Pulse:  [64-80] 79  Resp:  [16-18] 16  BP: (114-125)/(69-77) 114/69  SpO2:  [94 %-96 %] 94 %  I/O last 3 completed shifts:  In: 80 [NG/GT:80]  Out: 1510 [Urine:750; Emesis/NG output:25; Drains:735]    GENERAL: Comfortable, no apparent distress.  CARDIOVASCULAR: regular rate and rhythm, no murmur. No lower extremity edema   RESPIRATORY: Clear to auscultation bilaterally, no wheezes or crackles.  GI: non-tender, non-distended, normal bowel sounds.   SKIN: warm periphery, no rashes    Medications       enoxaparin ANTICOAGULANT   40 mg Subcutaneous Q24H     lactulose  20 g Oral BID     pantoprazole  40 mg Oral QAM AC     sodium chloride (PF)  3 mL Intracatheter Q8H       Data   Recent Labs   Lab 07/13/20  0412 07/12/20  0500 07/11/20  1322   WBC 6.2 4.1 5.9   HGB 14.7 14.8 17.4   MCV 99 99 98   * 119* 152   INR  --   --  1.27    137 133*   POTASSIUM 4.5 4.1 4.1   CHLORIDE 101 104 98   CO2 28 26 27   BUN 21 26* 29*   CR 0.93 0.83 0.96   ANIONGAP 7 7 8   JULIUS 8.0* 7.9* 9.0   GLC 84 81 101   ALBUMIN 2.9* 2.9* 3.7   PROTTOTAL 5.0* 5.0* 6.6   BILITOTAL 1.6* 2.1* 2.9*   ALKPHOS 99 100 115*   ALT 26 26 33   AST 31 31 38

## 2020-07-13 NOTE — PROGRESS NOTES
Writer talked with  Dr. Dubois regarding removal of SIDDHARTHA drain even though the output has been a total 240 mL out this am. Dr. Dubois said pull SIDDHARTHA drain. Jazzmine Tran RN on 7/13/2020 at 11:38 AM

## 2020-07-14 LAB
ANA SER QL IF: NEGATIVE
ANCA AB PATTERN SER IF-IMP: NORMAL
C-ANCA TITR SER IF: NORMAL {TITER}
LKM AB TITR SER IF: NORMAL {TITER}
MITOCHONDRIA M2 IGG SER-ACNC: 1 U/ML
SMA IGG SER-ACNC: 6 UNITS (ref 0–19)

## 2020-07-14 PROCEDURE — 25000128 H RX IP 250 OP 636: Performed by: INTERNAL MEDICINE

## 2020-07-14 PROCEDURE — 25000128 H RX IP 250 OP 636: Performed by: SURGERY

## 2020-07-14 PROCEDURE — 99024 POSTOP FOLLOW-UP VISIT: CPT | Performed by: SURGERY

## 2020-07-14 PROCEDURE — 25000132 ZZH RX MED GY IP 250 OP 250 PS 637: Mod: GY | Performed by: INTERNAL MEDICINE

## 2020-07-14 PROCEDURE — 99233 SBSQ HOSP IP/OBS HIGH 50: CPT | Performed by: INTERNAL MEDICINE

## 2020-07-14 PROCEDURE — 12000000 ZZH R&B MED SURG/OB

## 2020-07-14 PROCEDURE — 25000132 ZZH RX MED GY IP 250 OP 250 PS 637: Mod: GY | Performed by: SURGERY

## 2020-07-14 RX ORDER — FUROSEMIDE 40 MG
40 TABLET ORAL DAILY
Status: DISCONTINUED | OUTPATIENT
Start: 2020-07-14 | End: 2020-07-15 | Stop reason: HOSPADM

## 2020-07-14 RX ORDER — SPIRONOLACTONE 25 MG/1
25 TABLET ORAL DAILY
Status: DISCONTINUED | OUTPATIENT
Start: 2020-07-14 | End: 2020-07-15 | Stop reason: HOSPADM

## 2020-07-14 RX ORDER — CYANOCOBALAMIN 1000 UG/ML
1000 INJECTION, SOLUTION INTRAMUSCULAR; SUBCUTANEOUS ONCE
Status: COMPLETED | OUTPATIENT
Start: 2020-07-14 | End: 2020-07-14

## 2020-07-14 RX ORDER — CALCIUM CARBONATE 500 MG/1
500 TABLET, CHEWABLE ORAL DAILY PRN
Status: DISCONTINUED | OUTPATIENT
Start: 2020-07-14 | End: 2020-07-15 | Stop reason: HOSPADM

## 2020-07-14 RX ADMIN — LACTULOSE 20 G: 20 SOLUTION ORAL at 09:13

## 2020-07-14 RX ADMIN — SPIRONOLACTONE 25 MG: 25 TABLET ORAL at 10:30

## 2020-07-14 RX ADMIN — IBUPROFEN 600 MG: 600 TABLET ORAL at 09:14

## 2020-07-14 RX ADMIN — FUROSEMIDE 40 MG: 40 TABLET ORAL at 10:30

## 2020-07-14 RX ADMIN — CYANOCOBALAMIN 1000 MCG: 1000 INJECTION, SOLUTION INTRAMUSCULAR at 10:30

## 2020-07-14 RX ADMIN — ENOXAPARIN SODIUM 40 MG: 40 INJECTION SUBCUTANEOUS at 09:13

## 2020-07-14 RX ADMIN — LACTULOSE 20 G: 20 SOLUTION ORAL at 22:42

## 2020-07-14 RX ADMIN — ONDANSETRON HYDROCHLORIDE 4 MG: 2 SOLUTION INTRAMUSCULAR; INTRAVENOUS at 09:13

## 2020-07-14 RX ADMIN — PANTOPRAZOLE SODIUM 40 MG: 40 TABLET, DELAYED RELEASE ORAL at 07:34

## 2020-07-14 ASSESSMENT — ACTIVITIES OF DAILY LIVING (ADL)
ADLS_ACUITY_SCORE: 12

## 2020-07-14 ASSESSMENT — MIFFLIN-ST. JEOR: SCORE: 1545.11

## 2020-07-14 NOTE — PROGRESS NOTES
[facility]    Medicine Progress Note - Hospitalist Service       Date of Admission:  7/11/2020  Assessment & Plan   Phi Tabares is a 69 year old male who was admitted on 7/11/2020.     Principal Problem:    SBO (small bowel obstruction) (H)    Assessment: resolved    Plan: management per Dr. Dubois      Postoperative state    Assessment: postoperative day # 8 cholecystectomy    Plan: management per Dr. Dubois    Active Problems:    Crohn's disease with complication (H)    Assessment: daily imuran, which will be held per Dr. Delgadillo discussion with GI     Plan: arrange follow up with [facility] when discharged    Cirrhosis  Assessment: seen intraoperatively, SIDDHARTHA drain collecting ascites.   Plan: Will start lasix and spironolactone. Any further management per GI, I was able to schedule him with GI appointments with Dr. Scott of [facility] and also at [facility].       DVT Prophylaxis: Pneumatic Compression Devices  Code Status: Full Code    Dallas Whitehead         Diet: Regular Diet Adult    DVT Prophylaxis: Pneumatic Compression Devices  Hogan Catheter: not present  Code Status: Full Code           Disposition Plan   Expected discharge: Tomorrow, recommended to prior living arrangement once adequate pain management/ tolerating PO medications.  Entered: Dallas Whitehead MD 07/14/2020, 2:39 PM       The patient's care was discussed with the Patient.    Dallas Whitehead MD  Hospitalist Service  [facility]    ______________________________________________________________________    Interval History   abdomen bloating. No fevers, chills. Pain controlled. Passing stool and flatus.    Data reviewed today: I reviewed all medications, new labs and imaging results over the last 24 hours. I personally reviewed no images or EKG's today.    Physical Exam   Vital Signs: Temp: 98.8  F (37.1  C) Temp src: Tympanic BP: 120/74 Pulse: 72   Resp: 16 SpO2: 95 % O2 Device: None (Room  air)    Weight: 182 lbs 6.4 oz  GENERAL: Comfortable, no apparent distress.  CARDIOVASCULAR: regular rate and rhythm, no murmur. No lower extremity edema   RESPIRATORY: Clear to auscultation bilaterally, no wheezes or crackles.  GI: non-tender, distended, normal bowel sounds. Ascites present  SKIN: warm periphery, no rashes      Data   Recent Labs   Lab 07/13/20  0412 07/12/20  0500 07/11/20  1322   WBC 6.2 4.1 5.9   HGB 14.7 14.8 17.4   MCV 99 99 98   * 119* 152   INR  --   --  1.27    137 133*   POTASSIUM 4.5 4.1 4.1   CHLORIDE 101 104 98   CO2 28 26 27   BUN 21 26* 29*   CR 0.93 0.83 0.96   ANIONGAP 7 7 8   JULIUS 8.0* 7.9* 9.0   GLC 84 81 101   ALBUMIN 2.9* 2.9* 3.7   PROTTOTAL 5.0* 5.0* 6.6   BILITOTAL 1.6* 2.1* 2.9*   ALKPHOS 99 100 115*   ALT 26 26 33   AST 31 31 38

## 2020-07-14 NOTE — PLAN OF CARE
Today is POD 8 following lap javi. Trochar site CDI with slight bruising. Drain removed on day shift. Copious drainage per report. Pt showered. Dressing changed and CDI thus far. Drain site slight pink. Up ind. Reports loose BM, per baseline. Tolerating clear liquids. Inspiratory wheezing noted to lung fields. Encouraged pulmonary hygiene. Vitals stable.

## 2020-07-14 NOTE — PROGRESS NOTES
"Surgical Progress Note     POD#  8  s/p Laparoscopic cholecystectomy    Subjective: Tolerating liquids. Passing stool. Slept 5 hours last night.    Objective:  /77   Pulse 69   Temp 98.5  F (36.9  C) (Tympanic)   Resp 18   Ht 1.676 m (5' 6\")   Wt 82.7 kg (182 lb 6.4 oz)   SpO2 95%   BMI 29.44 kg/m        ABDOMEN: Soft and non-tender. Incisions without erythema. Drain site leaking and sealed with skin glue.    LABS  Recent Labs   Lab Test 07/13/20 0412 07/12/20  0500  01/24/18  1657 11/22/17  1652   WBC 6.2 4.1   < >  --   --    RBC 4.20* 4.21*   < >  --   --    HGB 14.7 14.8   < > 11.4* 11.5*   HCT 41.6 41.8   < > 37.5 37.2   MCV 99 99   < > 82 85   MCH 35.0* 35.2*   < > 24.9* 26.1   MCHC 35.3 35.4   < > 30.4* 30.9*   * 119*   < > 208 225   MPV  --   --   --  12.0* 12.2*    < > = values in this interval not displayed.       Recent Labs   Lab Test 07/13/20 0412 07/12/20  0500   POTASSIUM 4.5 4.1   CHLORIDE 101 104   BUN 21 26*       Recent Labs   Lab Test 07/13/20 0412 07/12/20  0500 07/11/20  1405   PROTEIN  --   --  10*   BILITOTAL 1.6* 2.1*  --    AST 31 31  --    ALT 26 26  --          ASSESSMENT  Stable. Bowel function returning.    PLAN  Increase diet  Home if tolerating  Consult GI Liver        Brandon Dubois MD      "

## 2020-07-14 NOTE — PROGRESS NOTES
Mild nausea occurred when patient started to eat his eggs this morning. Zofran given, pt will attempt to eat again later. Ibuprofen given for mild abdominal discomfort.   Adriana Hammer RN on 7/14/2020 at 9:48 AM

## 2020-07-14 NOTE — PLAN OF CARE
Patient has had no complaints today. He has had stable VS and minimal pain. Received ibuprofen once for some mild abdominal discomfort which helped. Zofran once for nausea this AM, helped also. Did tolerate a bowl of cream of chicken soup and some beverages today. Abdomen is slightly distended but not far from his norm per statement. Dressing intact on abdomen where SIDDHARTHA drain was. Surgeon had glued it this morning. Has had 7-8 loose stools today and is passing flatus frequently.     Adriana Hammer RN on 7/14/2020 at 6:50 PM

## 2020-07-15 VITALS
HEART RATE: 70 BPM | WEIGHT: 182.4 LBS | OXYGEN SATURATION: 98 % | HEIGHT: 66 IN | DIASTOLIC BLOOD PRESSURE: 67 MMHG | SYSTOLIC BLOOD PRESSURE: 100 MMHG | RESPIRATION RATE: 16 BRPM | TEMPERATURE: 98.9 F | BODY MASS INDEX: 29.32 KG/M2

## 2020-07-15 DIAGNOSIS — K74.60 CIRRHOSIS OF LIVER WITH ASCITES, UNSPECIFIED HEPATIC CIRRHOSIS TYPE (H): ICD-10-CM

## 2020-07-15 DIAGNOSIS — R18.8 CIRRHOSIS OF LIVER WITH ASCITES, UNSPECIFIED HEPATIC CIRRHOSIS TYPE (H): ICD-10-CM

## 2020-07-15 PROCEDURE — 25000128 H RX IP 250 OP 636: Performed by: SURGERY

## 2020-07-15 PROCEDURE — 25000132 ZZH RX MED GY IP 250 OP 250 PS 637: Mod: GY | Performed by: SURGERY

## 2020-07-15 PROCEDURE — 25000132 ZZH RX MED GY IP 250 OP 250 PS 637: Mod: GY | Performed by: INTERNAL MEDICINE

## 2020-07-15 PROCEDURE — 99024 POSTOP FOLLOW-UP VISIT: CPT | Performed by: SURGERY

## 2020-07-15 PROCEDURE — 99239 HOSP IP/OBS DSCHRG MGMT >30: CPT | Performed by: INTERNAL MEDICINE

## 2020-07-15 RX ORDER — SPIRONOLACTONE 25 MG/1
25 TABLET ORAL DAILY
Qty: 30 TABLET | Refills: 11 | Status: SHIPPED | OUTPATIENT
Start: 2020-07-15 | End: 2020-07-21

## 2020-07-15 RX ORDER — FUROSEMIDE 40 MG
40 TABLET ORAL DAILY
Qty: 30 TABLET | Refills: 11 | Status: SHIPPED | OUTPATIENT
Start: 2020-07-15 | End: 2020-07-21

## 2020-07-15 RX ORDER — CIPROFLOXACIN 500 MG/1
500 TABLET, FILM COATED ORAL 2 TIMES DAILY
Qty: 14 TABLET | Refills: 0 | Status: SHIPPED | OUTPATIENT
Start: 2020-07-15 | End: 2020-07-22

## 2020-07-15 RX ADMIN — ENOXAPARIN SODIUM 40 MG: 40 INJECTION SUBCUTANEOUS at 09:34

## 2020-07-15 RX ADMIN — FUROSEMIDE 40 MG: 40 TABLET ORAL at 09:34

## 2020-07-15 RX ADMIN — LACTULOSE 20 G: 20 SOLUTION ORAL at 09:34

## 2020-07-15 RX ADMIN — SPIRONOLACTONE 25 MG: 25 TABLET ORAL at 09:35

## 2020-07-15 RX ADMIN — PANTOPRAZOLE SODIUM 40 MG: 40 TABLET, DELAYED RELEASE ORAL at 07:32

## 2020-07-15 ASSESSMENT — ACTIVITIES OF DAILY LIVING (ADL)
ADLS_ACUITY_SCORE: 12

## 2020-07-15 NOTE — PLAN OF CARE
Patient reports feeling well. IV unable to flush, removed, patient reports discharging home today. Will not replace Iv. Patient denies any pain.

## 2020-07-15 NOTE — PHARMACY - DISCHARGE MEDICATION RECONCILIATION
Pharmacy:  Discharge Counseling and Medication Reconciliation    Phi Tabares  [address]  [place] [address] [phone] (home)   69 year old male  PCP: Perez Adamson    Allergies: Dye [contrast dye]; Iodine; and Shrimp    Discharge Counseling:    Pharmacist met with patient (and/or family) today to review the medication portion of the After Visit Summary (with an emphasis on NEW medications) and to address patient's questions/concerns.    Summary of Education: Met with patient and reviewed indication, expected duration, potential side effects, and answered questions.      Materials Provided:  MedCounselor sheets printed from Clinical Pharmacology on: Spironolactone, Furosemide and Ciprofloxacin     Discharge Medication Reconciliation:    It has been determined that the patient has an adequate supply of medications available or which can be obtained from the patient's preferred pharmacy, which HE/SHE has confirmed as: [facility]     Thank you for the consult.    Jonnie Stevenson RPH........July 15, 2020 9:23 AM

## 2020-07-15 NOTE — PROGRESS NOTES
[facility] DISCHARGE NOTE    Patient discharged to home at 11:13 AM via wheel chair. Accompanied by significant other and staff. Discharge instructions reviewed with patient, opportunity offered to ask questions. Prescriptions sent to patients preferred pharmacy. All belongings sent with patient.    Brenda Sainz RN

## 2020-07-15 NOTE — PROGRESS NOTES
"Surgical Progress Note     POD# 9 s/p laparoscopic cholecystectomy    Subjective: Tolerating diet. Passing stools.     Objective:  /67   Pulse 70   Temp 96.9  F (36.1  C) (Tympanic)   Resp 16   Ht 1.676 m (5' 6\")   Wt 82.7 kg (182 lb 6.4 oz)   SpO2 94%   BMI 29.44 kg/m      ABDOMEN: soft and non-tender. Incisions without erythema. Drain site dry.    LABS  Recent Labs   Lab Test 07/13/20 0412 07/12/20  0500  01/24/18  1657 11/22/17  1652   WBC 6.2 4.1   < >  --   --    RBC 4.20* 4.21*   < >  --   --    HGB 14.7 14.8   < > 11.4* 11.5*   HCT 41.6 41.8   < > 37.5 37.2   MCV 99 99   < > 82 85   MCH 35.0* 35.2*   < > 24.9* 26.1   MCHC 35.3 35.4   < > 30.4* 30.9*   * 119*   < > 208 225   MPV  --   --   --  12.0* 12.2*    < > = values in this interval not displayed.       Recent Labs   Lab Test 07/13/20 0412 07/12/20  0500   POTASSIUM 4.5 4.1   CHLORIDE 101 104   BUN 21 26*       Recent Labs   Lab Test 07/13/20 0412 07/12/20  0500 07/11/20  1405   PROTEIN  --   --  10*   BILITOTAL 1.6* 2.1*  --    AST 31 31  --    ALT 26 26  --            ASSESSMENT  Improving    PLAN  Discharge home      Brandon Dubois MD      "

## 2020-07-15 NOTE — DISCHARGE SUMMARY
"[facility]    Discharge Summary  Hospitalist    Date of Admission:  7/11/2020  Date of Discharge:  7/15/2020  Discharging Provider: Dallas Whitehead  Date of Service (when I saw the patient): 07/15/20    Discharge Diagnoses   Principal Problem:    SBO (small bowel obstruction) (H) (7/11/2020)    Cirrhosis of unknown cause, with ascites. Present on admission     Possible SBP  Active Problems:    Crohn's disease with complication (H) (4/26/2017)    Personal history of immunosuppressive therapy (8/22/2019)    Postoperative state (7/11/2020)        History of Present Illness   Phi Tabares is an 69 year old male who presented with lois pain, nausea and vomiting.  Per the H&P:  \"Phi Tabares is a 69 year old male who with PMH of Crohn's disease, on Imuran, s/p partial colon resection due to Crohn's complications, remote history of recurrent SBO's likely due to Crohn exacerbation as only treatment with steroids elevate exacerbations, iron deficiency anemia, GERD, s/p laparoscopic cholecystectomy on 7/6/2020, presented to ED with above-mentioned complaints.  He says postoperatively he stayed for while in the PACU due to difficult extubation, although was discharged home on the day of the surgery.  He never felt right postoperatively, including from day 1 due to nausea, diffuse abdominal pain.  He was discharged home with SIDDHARTHA drain, with substantial amount of clear yellowish fluid draining daily.  Patient was able to eat due to nausea, and on Thursday, 7/9 develop multiple episodes of nonbloody non-biliary emesis.  Vomiting subsided by 3 AM.  Patient was able to have some clear liquids/Gatorade and small amount of noodle soup yesterday.  He denies fevers or chills.  No bowel movement since surgery.  Passed small amount of flatus yesterday.  He feels his abdomen is distended.  No drainage or swelling surrounding surgical incisions.  Abdominal x-ray consistent with SBO.  No recent ill contacts.  No " "symptoms concerning for COVID-19 infection.\"    Hospital Course   Phi Tabares was admitted on 7/11/2020.  The following problems were addressed during his hospitalization:    Small bowel obstruction   General surgery was consulted and he was conservatively managed with an NG tube.  His symptoms subsided and he was stooling and tolerating a regular diet by discharge.    New diagnosis of cirrhosis  Cirrhosis of the liver was noted during laparoscopic cholecystectomy 9 days ago.  The patient does have ascites, and a SIDDHARTHA drain was draining continuous ascites in the hospital.  This was removed and his abdomen was sealed.  Given his history of Crohn's disease, autoimmune liver disease is in the differential diagnosis including primary biliary cirrhosis and sclerosing cholangitis.  Laboratory studies were sent and all negative, including ceruloplasmin, viral hepatitis panel, mitochondrial M2 antibody, myeloperoxidase antibody, ANCA panel, and proteinase 3 antibody.  The patient requested a consult with the [facility] and this was scheduled for August 18.  He was started on low-dose Lasix and spironolactone for ascites.    Crohn's disease  It was recommended by GI that the patient hold his Imuran at this time.  This can be resumed at a later date after he has healed up.  He has follow-up scheduled with Dr. Scott from GI at [facility] on August 5.    Possible bacterial peritonitis  The patient had a benign cell count initially, but after 3 days his fluid culture is growing out a gram-negative diplococcus.  Neisseria and Moraxella are possible bugs but I do not have a final ID yet.  I am uncertain if this is a contaminant.  He is postop day 9 from a laparoscopic cholecystectomy.  He will empirically be treated with 7 days of Cipro for this.    Dallas Whitehead MD      Code Status   Full Code       Primary Care Physician   Perez Adamson    Physical Exam   Temp: 98.9  F (37.2  C) Temp src: Tympanic BP: 100/67 " Pulse: 70 Heart Rate: 65 Resp: 16 SpO2: 98 % O2 Device: None (Room air)    Vitals:    07/12/20 0550 07/13/20 0400 07/14/20 0453   Weight: 80.9 kg (178 lb 6.4 oz) 82.1 kg (181 lb) 82.7 kg (182 lb 6.4 oz)     Vital Signs with Ranges  Temp:  [96.9  F (36.1  C)-99.4  F (37.4  C)] 98.9  F (37.2  C)  Pulse:  [70-72] 70  Heart Rate:  [65] 65  Resp:  [16] 16  BP: (100-120)/(67-78) 100/67  SpO2:  [94 %-98 %] 98 %  I/O last 3 completed shifts:  In: -   Out: 300 [Urine:300]    GENERAL: Comfortable, no apparent distress.  CARDIOVASCULAR: regular rate and rhythm, no murmur. No lower extremity edema   RESPIRATORY: Clear to auscultation bilaterally, no wheezes or crackles.  GI: non-tender, non-distended, normal bowel sounds.   SKIN: warm periphery, no rashes      Discharge Disposition   Discharged to home  Condition at discharge: Stable    Consultations This Hospital Stay   SURGERY GENERAL IP CONSULT  SURGERY GENERAL IP CONSULT    Time Spent on this Encounter   I, Dallas Whitehead MD, personally saw the patient today and spent greater than 30 minutes discharging this patient.    Discharge Orders      Reason for your hospital stay    Small bowel obstruction, new diagnosis of cirrhosis     Follow-up and recommended labs and tests    Follow up with:  July 21st at 3:40 PM with Dr. Adamson.     Dr. Scott on Wednesday August 5th at 1200 at [facility] appt   Aug 18 1240 9th [facility] - they will call you     Activity    Your activity upon discharge: activity as tolerated     Diet    Follow this diet upon discharge: Orders Placed This Encounter      Regular Diet Adult       Discharge Medications   Current Discharge Medication List      START taking these medications    Details   ciprofloxacin (CIPRO) 500 MG tablet Take 1 tablet (500 mg) by mouth 2 times daily for 7 days  Qty: 14 tablet, Refills: 0    Associated Diagnoses: Cirrhosis of liver with ascites, unspecified hepatic cirrhosis type (H)     "  furosemide (LASIX) 40 MG tablet Take 1 tablet (40 mg) by mouth daily  Qty: 30 tablet, Refills: 11    Associated Diagnoses: Cirrhosis of liver with ascites, unspecified hepatic cirrhosis type (H)      spironolactone (ALDACTONE) 25 MG tablet Take 1 tablet (25 mg) by mouth daily  Qty: 30 tablet, Refills: 11    Associated Diagnoses: Cirrhosis of liver with ascites, unspecified hepatic cirrhosis type (H)         CONTINUE these medications which have NOT CHANGED    Details   acetaminophen (TYLENOL) 325 MG tablet Take 2 tablets (650 mg) by mouth every 4 hours as needed for other (mild pain)  Qty: 100 tablet, Refills: 0    Associated Diagnoses: Calculus of gallbladder without cholecystitis without obstruction      Ascorbic Acid (VITAMIN C) 500 MG CAPS Take 1 capsule by mouth daily       aspirin EC 81 MG EC tablet Take 81 mg by mouth daily with food      atorvastatin (LIPITOR) 10 MG tablet Take 1 tablet (10 mg) by mouth daily  Qty: 90 tablet, Refills: 3    Associated Diagnoses: [place] cardiac risk 10-20% in next 10 years      BD DISP NEEDLES 22G X 1-1/2\" MISC USE FOR VITAMIN B-12 INJECTIONS EVERY 2 WEEKS  Qty: 2 each, Refills: 1    Associated Diagnoses: Anemia      benzonatate (TESSALON) 100 MG capsule Take 1 capsule (100 mg) by mouth 3 times daily as needed for cough  Qty: 30 capsule, Refills: 0    Associated Diagnoses: Personal history of tobacco use, presenting hazards to health      calcium carbonate-vitamin D (OS-JULIUS) 500-400 MG-UNIT tablet Take 1 tablet by mouth daily      Ferrous Sulfate (IRON) 325 (65 Fe) MG tablet Take 1 tablet by mouth daily (with breakfast)  Qty: 90 tablet, Refills: 3    Associated Diagnoses: Crohn's disease of small and large intestines with complication (H)      ibuprofen (ADVIL/MOTRIN) 600 MG tablet Take 1 tablet (600 mg) by mouth every 6 hours as needed for pain (mild)  Qty: 30 tablet, Refills: 0    Associated Diagnoses: Calculus of gallbladder without cholecystitis without obstruction "      oxyCODONE-acetaminophen (PERCOCET) 5-325 MG tablet Take 1-2 tablets by mouth every 4 hours as needed for pain (moderate to severe)  Qty: 12 tablet, Refills: 0    Associated Diagnoses: Calculus of gallbladder without cholecystitis without obstruction      pantoprazole (PROTONIX) 40 MG EC tablet Take 1 tablet (40 mg) by mouth 2 times daily  Qty: 180 tablet, Refills: 3    Associated Diagnoses: Gastroesophageal reflux disease without esophagitis      Probiotic Product (PROBIOTIC ACIDOPHILUS) CAPS 2 times daily Probiotic & Acidophilus 300 million cell 250 mg Cap      senna-docusate (SENOKOT-S/PERICOLACE) 8.6-50 MG tablet Take 1-2 tablets by mouth 2 times daily Take while on oral narcotics to prevent or treat constipation.  Qty: 30 tablet, Refills: 0    Comments: While on narcotics  Associated Diagnoses: Calculus of gallbladder without cholecystitis without obstruction      cyanocobalamin (CYANOCOBALAMIN) 1000 MCG/ML injection INJECT 1 ML INTO THE MUSCLE EVERY 3 WEEKS  Qty: 4 mL, Refills: 1    Associated Diagnoses: Pernicious anemia         STOP taking these medications       azaTHIOprine (IMURAN) 50 MG tablet Comments:   Reason for Stopping:             Allergies   Allergies   Allergen Reactions     Dye [Contrast Dye]      IV Dye, Iodine Containing Contrast Media     Iodine Rash     Shrimp Rash     Data   Most Recent 3 CBC's:  Recent Labs   Lab Test 07/13/20 0412 07/12/20  0500 07/11/20  1322   WBC 6.2 4.1 5.9   HGB 14.7 14.8 17.4   MCV 99 99 98   * 119* 152      Most Recent 3 BMP's:  Recent Labs   Lab Test 07/13/20 0412 07/12/20  0500 07/11/20  1322    137 133*   POTASSIUM 4.5 4.1 4.1   CHLORIDE 101 104 98   CO2 28 26 27   BUN 21 26* 29*   CR 0.93 0.83 0.96   ANIONGAP 7 7 8   JULISU 8.0* 7.9* 9.0   GLC 84 81 101     Most Recent 2 LFT's:  Recent Labs   Lab Test 07/13/20 0412 07/12/20  0500   AST 31 31   ALT 26 26   ALKPHOS 99 100   BILITOTAL 1.6* 2.1*     Most Recent INR's and Anticoagulation Dosing  History:  Anticoagulation Dose History     Recent Dosing and Labs Latest Ref Rng & Units 7/11/2020    INR 0 - 1.3 1.27        Most Recent 3 Troponin's:No lab results found.  Most Recent Cholesterol Panel:  Recent Labs   Lab Test 08/22/19  1459   CHOL 149   LDL 78   HDL 46   TRIG 124     Most Recent 6 Bacteria Isolates From Any Culture (See EPIC Reports for Culture Details):  Recent Labs   Lab Test 07/11/20  1701 07/11/20  1323 07/11/20  1322   CULT Light growth  Gram positive cocci  Identification to follow.  Susceptibility to follow.  *  Moderate growth  Gram negative diplococci  ID and Sensitivities Requested  Final when sensitivities completed  *  (Note)  ISO 3 Sent for ID and Susceptibility. Oxidase Positive. Gram Negative  Diplococci   No growth after 3 days No growth after 3 days     Most Recent TSH, T4 and A1c Labs:  Recent Labs   Lab Test 08/01/16  1404   TSH 1.28     Results for orders placed or performed during the hospital encounter of 07/11/20   XR Abdomen 2 Views    Narrative    XR ABDOMEN 2 VW   7/11/2020 1:40 PM    History:Male,age  69 years, possible partial SBO    Comparison: None    FINDINGS: Two views are submitted. There are number of gas-filled,  distended small bowel loops throughout the abdomen and pelvis. Upright  examination demonstrates no evidence of free air. Drainage catheter is  seen in the right hemiabdomen.      Impression    IMPRESSION:  Small bowel obstruction without evidence of free air..    SEPIDEH HAND MD   XR Abdomen Port 1 View    Narrative    XR ABDOMEN PORT 1 VW, 7/11/2020 3:18 PM    History: Male, age 69 years; NG tube placement confirmation    Comparison: 7/11/2020    Technique: Single view .    FINDINGS: Nasogastric tube is now seen extending into the left upper  quadrants. The tip is in the expected location of the stomach.  Sidehole is not well seen. Lung bases are clear. Bowel gas pattern is  similar as is a right upper quadrant drain.      Impression     IMPRESSION:   Nasogastric tube tip appears to lie in the expected location of the  stomach. Sidehole is not well seen however may lie at the GE junction.    SEPIDEH HAND MD   CT Abdomen pelvis w contrast*    Narrative    CT ABDOMEN PELVIS W CONTRAST    CLINICAL HISTORY: Male, age 69 years,  Nausea, vomiting; Abd pain,  acute, generalized, with fever, post-op; with oral contrast as well;    Comparison:  MRI abdomen 6/11/2020    TECHNIQUE:  CT was performed of the abdomen and pelvis with IV and  oral contrast. Sagittal, coronal and axial reconstructions were  reviewed.     FINDINGS:    Abdomen/Pelvis CT:  Lung Bases:  Peripheral areas of atelectasis throughout the lung  bases, most evident along the posterior medial aspect of the right  lower lobe.    Esophagus/stomach: Nasogastric tube is satisfactorily positioned.    Liver:  Intrahepatic biliary dilatation is similar dating back to the  6/11/2020 MRI.    Portal venous system: Patent.  Gallbladder: Surgically absent. Small volume of fluid is seen at the  gallbladder fossa.    Spleen: Unchanged splenomegaly.    Pancreas: Normal.    Adrenal Glands: Normal.    Kidneys: 8 mm cortical cyst again seen posteriorly within the right  kidney.    Ureters: Normal.  Urinary bladder: Normal.    Abdominal Aorta: Scattered atherosclerotic calcifications.  IVC: Normal.    Lymph Nodes: Normal.    Large and Small Bowel: Mild fat stranding adjacent to the duodenum,  likely residual from recent surgery. Small volume of fluid lying  adjacent to the duodenum as well as within the gallbladder fossa.     Postoperative changes suggest right hemicolectomy. There are number  of contrast filled, distended loops of small bowel. Nondistended loops  of small bowel are also seen without contrast.    Pelvic Organs:  Normal.  Peritoneum: Drainage catheter is seen in the right upper quadrants,  lying along the superficial aspects of the liver adjacent to loop of  colon. There is minimal volume of  fluid seen along the dome of liver  adjacent to the catheter. No evidence of apparent abscess.    Bony structures: No acute abnormality. Moderate degenerative change of  the lumbar spine.    Other Findings:  Inguinal lymph nodes are normal.      Impression    IMPRESSION:   Postoperative changes consistent with cholecystectomy with small  volume of fluid in the gallbladder fossa and adjacent to the duodenum  as well as along the dome the liver. No evidence of apparent abscess.    Distended and nondistended loops of small bowel raise concern for the  possibility of obstruction as the distended loops are filled with  contrast and a nondistended loops have no contrast.     Bibasilar atelectasis with focal consolidation/pneumonia in the  posterior medial aspect of the right lower lobe.    SEPIDEH HAND MD

## 2020-07-15 NOTE — PLAN OF CARE
"/78   Pulse 72   Temp 99.4  F (37.4  C) (Tympanic)   Resp 16   Ht 1.676 m (5' 6\")   Wt 82.7 kg (182 lb 6.4 oz)   SpO2 96%   BMI 29.44 kg/m      Pt VS.  Denies any pain.  SIDDHARTHA site cdi.  Will continue to monitor.  Aracelis Bucio RN.............................7/15/2020 1:58 AM    "

## 2020-07-16 ENCOUNTER — PATIENT OUTREACH (OUTPATIENT)
Dept: CARE COORDINATION | Facility: CLINIC | Age: 70
End: 2020-07-16

## 2020-07-16 LAB
BACTERIA SPEC CULT: ABNORMAL
SPECIMEN SOURCE: ABNORMAL

## 2020-07-16 NOTE — PROGRESS NOTES
Transitional Care Management Phone Call    Summary of hospitalization:  [facility] discharge summary reviewed    DISCHARGE DIAGNOSIS: small bowel obstruction, cirrhosis    DATE OF DISCHARGE: 07-    Diagnostic Tests/Treatments reviewed.  Follow up needed: surgical and GI appointment    Post Discharge Medication Reconciliation: discharge medications reconciled, continue medications without change.   Understands his medication changes. Has questions about whether iron should be continued after reading it may not be good with Crohns    Medications reviewed by: by myself    Problems taking medications regularly:  None    Problems adhering to non-medication therapy:  None    Other Healthcare Providers Involved in Patient s Care:         Specialist appointment - GI at [place], Surgeon-Dr. Bella, Dr. [facility]/[place]     Update since discharge: improved. Patient feels some improvement. Slept well for first time in a long time. Denies concerns right now.     Plan of care communicated with patient    Just a friendly reminder that you appointment is   Next 5 appointments (look out 90 days)    Jul 17, 2020 10:30 AM CDT  [facility] [address]  [place] [address]  [phone]   Jul 21, 2020  3:40 PM CDT  [facility] with [facility] MD  [facility] [address]  [place] [address]  [phone]      .  We encourage you to keep this appointment.    Please remember to bring all of your pills in their bottles (including any vitamins or over the counter pills) with you to your appointment.   The patient indicates understanding of these issues and agrees with the plan of care.   Yes, plans to follow plan of care and keep the scheduled appointment.    Was the patient contacted within the 2 business days or other approved  timeframe?  Yes    Was the Medication reconciliation and management done since the patient was discharged? Yes    Susie Orozco RN  7/16/2020 9:24 AM

## 2020-07-17 ENCOUNTER — OFFICE VISIT (OUTPATIENT)
Dept: SURGERY | Facility: OTHER | Age: 70
End: 2020-07-17
Attending: SURGERY
Payer: MEDICARE

## 2020-07-17 VITALS
RESPIRATION RATE: 16 BRPM | HEART RATE: 68 BPM | SYSTOLIC BLOOD PRESSURE: 112 MMHG | DIASTOLIC BLOOD PRESSURE: 66 MMHG | WEIGHT: 183.8 LBS | TEMPERATURE: 99 F | BODY MASS INDEX: 29.67 KG/M2

## 2020-07-17 DIAGNOSIS — K74.60 CIRRHOSIS OF LIVER WITH ASCITES, UNSPECIFIED HEPATIC CIRRHOSIS TYPE (H): Primary | ICD-10-CM

## 2020-07-17 DIAGNOSIS — K80.50 CALCULUS OF BILE DUCT WITHOUT CHOLECYSTITIS AND WITHOUT OBSTRUCTION: ICD-10-CM

## 2020-07-17 DIAGNOSIS — R18.8 CIRRHOSIS OF LIVER WITH ASCITES, UNSPECIFIED HEPATIC CIRRHOSIS TYPE (H): Primary | ICD-10-CM

## 2020-07-17 LAB
BACTERIA SPEC CULT: NORMAL
BACTERIA SPEC CULT: NORMAL
SPECIMEN SOURCE: NORMAL
SPECIMEN SOURCE: NORMAL

## 2020-07-17 PROCEDURE — 99024 POSTOP FOLLOW-UP VISIT: CPT | Performed by: SURGERY

## 2020-07-17 ASSESSMENT — PAIN SCALES - GENERAL: PAINLEVEL: NO PAIN (0)

## 2020-07-17 NOTE — PROGRESS NOTES
Let patient know that PCP said to continue with iron supplementation. Understanding verbalized. Susie Orozco RN on 7/17/2020 at 12:09 PM

## 2020-07-17 NOTE — NURSING NOTE
"Chief Complaint   Patient presents with     Surgical Followup     post op lap javi       Initial /66 (BP Location: Right arm, Patient Position: Sitting, Cuff Size: Adult Regular)   Pulse 68   Temp 99  F (37.2  C) (Tympanic)   Resp 16   Wt 83.4 kg (183 lb 12.8 oz)   BMI 29.67 kg/m   Estimated body mass index is 29.67 kg/m  as calculated from the following:    Height as of 7/11/20: 1.676 m (5' 6\").    Weight as of this encounter: 83.4 kg (183 lb 12.8 oz).  Medication Reconciliation: Completed     Kajal Bull LPN  "

## 2020-07-20 PROBLEM — K80.50 CALCULUS OF BILE DUCT WITHOUT CHOLECYSTITIS AND WITHOUT OBSTRUCTION: Status: ACTIVE | Noted: 2020-07-20

## 2020-07-20 NOTE — PROGRESS NOTES
Patient presents for post surgical visit after lap javi on 7/6/2020. Patient has done well. No problems with incisions. Pt had SBO and was admitted.  First week was though post operatively. Pt has had several SBO in the past.  Feeling good this week.  No drainage from inscisions. SIDDHARTHA removed during admission for SBO.      /66 (BP Location: Right arm, Patient Position: Sitting, Cuff Size: Adult Regular)   Pulse 68   Temp 99  F (37.2  C) (Tympanic)   Resp 16   Wt 83.4 kg (183 lb 12.8 oz)   BMI 29.67 kg/m      General: NAD, pleasant and cooperative with exam and interview.  Abdomen: healing incisions. No sign of infection. No pain with palpation.  Psychiatry: awake, alert and oriented. Appropriate affect.    Assessment/Plan:  Discussed surgery and pathology results. Significant chirrosis with ascities noted at surgery.  Pain prior to surgery resolved. Patient can return to normal activities. Patient will call with questions or concerns.    - Hepatology workup at [facility]    Ludwig Bella MD on 7/20/2020 at 8:56 AM

## 2020-07-21 RX ORDER — FUROSEMIDE 40 MG
TABLET ORAL
Qty: 90 TABLET | Refills: 3 | Status: SHIPPED | OUTPATIENT
Start: 2020-07-21 | End: 2021-01-01

## 2020-07-21 RX ORDER — SPIRONOLACTONE 25 MG/1
TABLET ORAL
Qty: 90 TABLET | Refills: 3 | Status: SHIPPED | OUTPATIENT
Start: 2020-07-21 | End: 2021-01-01 | Stop reason: DRUGHIGH

## 2020-07-21 NOTE — TELEPHONE ENCOUNTER
"Requested Prescriptions   Pending Prescriptions Disp Refills     furosemide (LASIX) 40 MG tablet [Pharmacy Med Name: FUROSEMIDE 40MG TABLETS] 90 tablet      Sig: TAKE 1 TABLET(40 MG) BY MOUTH DAILY       Diuretics (Including Combos) Protocol Failed - 7/15/2020  9:22 AM        Failed - Recent (12 mo) or future (30 days) visit within the authorizing provider's specialty     Patient has had an office visit with the authorizing provider or a provider within the authorizing providers department within the previous 12 mos or has a future within next 30 days. See \"Patient Info\" tab in inbasket, or \"Choose Columns\" in Meds & Orders section of the refill encounter.              Passed - Blood pressure under 140/90 in past 12 months     BP Readings from Last 3 Encounters:   07/17/20 112/66   07/15/20 100/67   07/06/20 97/65                 Passed - Medication is active on med list        Passed - Patient is age 18 or older        Passed - Normal serum creatinine on file in past 12 months     Recent Labs   Lab Test 07/13/20  0412   CR 0.93              Passed - Normal serum potassium on file in past 12 months     Recent Labs   Lab Test 07/13/20  0412   POTASSIUM 4.5                    Passed - Normal serum sodium on file in past 12 months     Recent Labs   Lab Test 07/13/20  0412                    spironolactone (ALDACTONE) 25 MG tablet [Pharmacy Med Name: SPIRONOLACTONE 25MG TABLETS] 90 tablet      Sig: TAKE 1 TABLET(25 MG) BY MOUTH DAILY       Diuretics (Including Combos) Protocol Failed - 7/15/2020  9:22 AM        Failed - Recent (12 mo) or future (30 days) visit within the authorizing provider's specialty     Patient has had an office visit with the authorizing provider or a provider within the authorizing providers department within the previous 12 mos or has a future within next 30 days. See \"Patient Info\" tab in inbasket, or \"Choose Columns\" in Meds & Orders section of the refill encounter.              Passed - " Blood pressure under 140/90 in past 12 months     BP Readings from Last 3 Encounters:   07/17/20 112/66   07/15/20 100/67   07/06/20 97/65                 Passed - Medication is active on med list        Passed - Patient is age 18 or older        Passed - Normal serum creatinine on file in past 12 months     Recent Labs   Lab Test 07/13/20  0412   CR 0.93              Passed - Normal serum potassium on file in past 12 months     Recent Labs   Lab Test 07/13/20  0412   POTASSIUM 4.5                    Passed - Normal serum sodium on file in past 12 months     Recent Labs   Lab Test 07/13/20  0412                  Medication was recently refilled when discharged from hospital with Dallas Whitehead MD was given #30 with 11 refills.  Patient is requesting a 90 day supply with 3 refills.  Will send to Perez Adamson MD to address.  Chastity Naylor RN on 7/21/2020 at 1:40 PM

## 2020-07-22 ENCOUNTER — E-VISIT (OUTPATIENT)
Dept: PEDIATRICS | Facility: OTHER | Age: 70
End: 2020-07-22
Payer: MEDICARE

## 2020-07-22 DIAGNOSIS — R30.0 DYSURIA: Primary | ICD-10-CM

## 2020-07-23 NOTE — TELEPHONE ENCOUNTER
He needs to be seen in person.    Signed, Perez Adamson MD, FAAP, FACP  Internal Medicine & Pediatrics

## 2020-07-29 ENCOUNTER — TRANSFERRED RECORDS (OUTPATIENT)
Dept: HEALTH INFORMATION MANAGEMENT | Facility: OTHER | Age: 70
End: 2020-07-29

## 2020-08-15 DIAGNOSIS — K50.819 CROHN'S DISEASE OF SMALL AND LARGE INTESTINES WITH COMPLICATION (H): ICD-10-CM

## 2020-08-17 RX ORDER — FERROUS SULFATE 325(65) MG
TABLET ORAL
Qty: 90 TABLET | Refills: 3 | Status: SHIPPED | OUTPATIENT
Start: 2020-08-17 | End: 2021-01-01

## 2020-08-17 NOTE — TELEPHONE ENCOUNTER
[facility] sent Rx request for the following:   Ferrous Sulfate (IRON) 325 (65 Fe) MG tablet   Sig: Take 1 tablet by mouth daily (with breakfast) - Oral     Last Prescription Date:   08/22/2019  Last Fill Qty/Refills:         90, R-3    Last Office Visit:              07/22/2020   Future Office visit:           none  Iron Supplements Passed      Prescription refilled per RN Medication Refill Policy.................... Kajal Beltre RN ....................  8/17/2020   10:23 AM

## 2020-08-26 ENCOUNTER — TRANSFERRED RECORDS (OUTPATIENT)
Dept: HEALTH INFORMATION MANAGEMENT | Facility: OTHER | Age: 70
End: 2020-08-26

## 2020-09-23 ENCOUNTER — TRANSFERRED RECORDS (OUTPATIENT)
Dept: HEALTH INFORMATION MANAGEMENT | Facility: OTHER | Age: 70
End: 2020-09-23

## 2020-09-26 DIAGNOSIS — Z91.89 FRAMINGHAM CARDIAC RISK 10-20% IN NEXT 10 YEARS: ICD-10-CM

## 2020-09-28 RX ORDER — ATORVASTATIN CALCIUM 10 MG/1
TABLET, FILM COATED ORAL
Qty: 90 TABLET | Refills: 3 | Status: SHIPPED | OUTPATIENT
Start: 2020-09-28 | End: 2021-01-01

## 2020-10-31 DIAGNOSIS — K21.9 GASTROESOPHAGEAL REFLUX DISEASE WITHOUT ESOPHAGITIS: ICD-10-CM

## 2020-11-02 RX ORDER — PANTOPRAZOLE SODIUM 40 MG/1
TABLET, DELAYED RELEASE ORAL
Qty: 180 TABLET | Refills: 3 | Status: SHIPPED | OUTPATIENT
Start: 2020-11-02 | End: 2021-01-01

## 2020-11-02 NOTE — TELEPHONE ENCOUNTER
" Disp Refills Start End FÁTIMA   pantoprazole (PROTONIX) 40 MG EC tablet 180 tablet 3 8/22/2019  No   Sig - Route: Take 1 tablet (40 mg) by mouth 2 times daily - Oral       LOV: 6/23/2020  Future Office visit: No future appointment scheduled at this time.      Routing refill request to provider for review/approval because:  Failed protocol    Requested Prescriptions   Pending Prescriptions Disp Refills     pantoprazole (PROTONIX) 40 MG EC tablet [Pharmacy Med Name: PANTOPRAZOLE 40MG TABLETS] 180 tablet 3     Sig: TAKE 1 TABLET(40 MG) BY MOUTH TWICE DAILY       PPI Protocol Failed - 10/31/2020  4:02 AM        Failed - No diagnosis of osteoporosis on record        Passed - Not on Clopidogrel (unless Pantoprazole ordered)        Passed - Recent (12 mo) or future (30 days) visit within the authorizing provider's specialty     Patient has had an office visit with the authorizing provider or a provider within the authorizing providers department within the previous 12 mos or has a future within next 30 days. See \"Patient Info\" tab in inbasket, or \"Choose Columns\" in Meds & Orders section of the refill encounter.              Passed - Medication is active on med list        Passed - Patient is age 18 or older           Unable to complete prescription refill per RN Medication Refill Policy.................... Sayra Byrd RN ....................  11/2/2020   1:37 PM        "

## 2020-12-08 NOTE — TELEPHONE ENCOUNTER
Jenny sent Rx request for the following:      CYANOCOBALAMIN 1000MCG/ML INJ, 1ML  Sig: ADMINISTER 1 ML IN THE MUSCLE EVERY 3 WEEKS      Last Prescription Date:   4/2/2020  Last Fill Qty/Refills:         4ml, R-1    Last Office Visit:              6/23/2020   Future Office visit:           none    Prescription refilled per RN Medication Refill Policy.................... Kevin Coker RN ....................  12/8/2020   4:03 PM

## 2021-01-01 ENCOUNTER — TRANSFERRED RECORDS (OUTPATIENT)
Dept: HEALTH INFORMATION MANAGEMENT | Facility: OTHER | Age: 71
End: 2021-01-01

## 2021-01-01 ENCOUNTER — TELEPHONE (OUTPATIENT)
Dept: PEDIATRICS | Facility: OTHER | Age: 71
End: 2021-01-01

## 2021-01-01 ENCOUNTER — HOSPITAL ENCOUNTER (EMERGENCY)
Facility: OTHER | Age: 71
Discharge: HOME | End: 2021-09-27
Attending: STUDENT IN AN ORGANIZED HEALTH CARE EDUCATION/TRAINING PROGRAM | Admitting: STUDENT IN AN ORGANIZED HEALTH CARE EDUCATION/TRAINING PROGRAM
Payer: MEDICARE

## 2021-01-01 ENCOUNTER — APPOINTMENT (OUTPATIENT)
Dept: ULTRASOUND IMAGING | Facility: OTHER | Age: 71
End: 2021-01-01
Attending: PHYSICIAN ASSISTANT
Payer: MEDICARE

## 2021-01-01 ENCOUNTER — MEDICAL CORRESPONDENCE (OUTPATIENT)
Dept: HEALTH INFORMATION MANAGEMENT | Facility: OTHER | Age: 71
End: 2021-01-01
Payer: MEDICARE

## 2021-01-01 ENCOUNTER — HOSPITAL ENCOUNTER (EMERGENCY)
Facility: OTHER | Age: 71
Discharge: HOME | End: 2021-10-11
Attending: PHYSICIAN ASSISTANT | Admitting: PHYSICIAN ASSISTANT
Payer: MEDICARE

## 2021-01-01 ENCOUNTER — OFFICE VISIT (OUTPATIENT)
Dept: PEDIATRICS | Facility: OTHER | Age: 71
End: 2021-01-01
Attending: INTERNAL MEDICINE
Payer: MEDICARE

## 2021-01-01 ENCOUNTER — HEALTH MAINTENANCE LETTER (OUTPATIENT)
Age: 71
End: 2021-01-01

## 2021-01-01 ENCOUNTER — TELEPHONE (OUTPATIENT)
Dept: ULTRASOUND IMAGING | Facility: OTHER | Age: 71
End: 2021-01-01
Payer: MEDICARE

## 2021-01-01 ENCOUNTER — HOSPITAL ENCOUNTER (EMERGENCY)
Facility: OTHER | Age: 71
Discharge: HOME | End: 2021-09-28
Attending: STUDENT IN AN ORGANIZED HEALTH CARE EDUCATION/TRAINING PROGRAM | Admitting: STUDENT IN AN ORGANIZED HEALTH CARE EDUCATION/TRAINING PROGRAM
Payer: MEDICARE

## 2021-01-01 ENCOUNTER — DOCUMENTATION ONLY (OUTPATIENT)
Dept: OTHER | Facility: CLINIC | Age: 71
End: 2021-01-01

## 2021-01-01 ENCOUNTER — HOSPITAL ENCOUNTER (OUTPATIENT)
Dept: ULTRASOUND IMAGING | Facility: OTHER | Age: 71
Discharge: HOME | End: 2021-10-27
Attending: INTERNAL MEDICINE | Admitting: INTERNAL MEDICINE
Payer: MEDICARE

## 2021-01-01 ENCOUNTER — APPOINTMENT (OUTPATIENT)
Dept: ULTRASOUND IMAGING | Facility: OTHER | Age: 71
End: 2021-01-01
Attending: STUDENT IN AN ORGANIZED HEALTH CARE EDUCATION/TRAINING PROGRAM
Payer: MEDICARE

## 2021-01-01 ENCOUNTER — HOSPITAL ENCOUNTER (OUTPATIENT)
Dept: ULTRASOUND IMAGING | Facility: OTHER | Age: 71
Discharge: HOME | End: 2021-11-05
Attending: INTERNAL MEDICINE | Admitting: INTERNAL MEDICINE
Payer: MEDICARE

## 2021-01-01 VITALS
RESPIRATION RATE: 18 BRPM | SYSTOLIC BLOOD PRESSURE: 114 MMHG | HEART RATE: 86 BPM | HEIGHT: 66 IN | TEMPERATURE: 99.3 F | OXYGEN SATURATION: 94 % | BODY MASS INDEX: 29.94 KG/M2 | WEIGHT: 186.29 LBS | DIASTOLIC BLOOD PRESSURE: 78 MMHG

## 2021-01-01 VITALS
DIASTOLIC BLOOD PRESSURE: 82 MMHG | RESPIRATION RATE: 20 BRPM | TEMPERATURE: 97.8 F | SYSTOLIC BLOOD PRESSURE: 122 MMHG | OXYGEN SATURATION: 97 % | BODY MASS INDEX: 32.06 KG/M2 | HEART RATE: 105 BPM | WEIGHT: 204.7 LBS

## 2021-01-01 VITALS
RESPIRATION RATE: 16 BRPM | WEIGHT: 186.3 LBS | BODY MASS INDEX: 31.04 KG/M2 | HEART RATE: 84 BPM | SYSTOLIC BLOOD PRESSURE: 122 MMHG | DIASTOLIC BLOOD PRESSURE: 70 MMHG | HEIGHT: 65 IN | TEMPERATURE: 97.8 F | OXYGEN SATURATION: 100 %

## 2021-01-01 VITALS
SYSTOLIC BLOOD PRESSURE: 127 MMHG | OXYGEN SATURATION: 97 % | TEMPERATURE: 96.1 F | HEART RATE: 98 BPM | RESPIRATION RATE: 20 BRPM | DIASTOLIC BLOOD PRESSURE: 87 MMHG

## 2021-01-01 VITALS
HEIGHT: 66 IN | SYSTOLIC BLOOD PRESSURE: 147 MMHG | TEMPERATURE: 97.9 F | HEART RATE: 74 BPM | WEIGHT: 186 LBS | OXYGEN SATURATION: 98 % | DIASTOLIC BLOOD PRESSURE: 89 MMHG | BODY MASS INDEX: 29.89 KG/M2 | RESPIRATION RATE: 16 BRPM

## 2021-01-01 VITALS
TEMPERATURE: 97.8 F | SYSTOLIC BLOOD PRESSURE: 134 MMHG | DIASTOLIC BLOOD PRESSURE: 94 MMHG | OXYGEN SATURATION: 100 % | RESPIRATION RATE: 20 BRPM | HEART RATE: 100 BPM

## 2021-01-01 VITALS
RESPIRATION RATE: 19 BRPM | SYSTOLIC BLOOD PRESSURE: 117 MMHG | HEART RATE: 70 BPM | DIASTOLIC BLOOD PRESSURE: 78 MMHG | HEIGHT: 67 IN | WEIGHT: 203 LBS | BODY MASS INDEX: 31.86 KG/M2 | OXYGEN SATURATION: 98 % | TEMPERATURE: 97.3 F

## 2021-01-01 DIAGNOSIS — K65.2 SBP (SPONTANEOUS BACTERIAL PERITONITIS) (H): ICD-10-CM

## 2021-01-01 DIAGNOSIS — R18.8 CIRRHOSIS OF LIVER WITH ASCITES, UNSPECIFIED HEPATIC CIRRHOSIS TYPE (H): ICD-10-CM

## 2021-01-01 DIAGNOSIS — K80.20 CALCULUS OF GALLBLADDER WITHOUT CHOLECYSTITIS WITHOUT OBSTRUCTION: ICD-10-CM

## 2021-01-01 DIAGNOSIS — C22.1 CHOLANGIOCARCINOMA (H): ICD-10-CM

## 2021-01-01 DIAGNOSIS — Z11.59 ENCOUNTER FOR SCREENING FOR OTHER VIRAL DISEASES: ICD-10-CM

## 2021-01-01 DIAGNOSIS — Z71.89 DNR (DO NOT RESUSCITATE) DISCUSSION: ICD-10-CM

## 2021-01-01 DIAGNOSIS — G47.00 INSOMNIA, UNSPECIFIED TYPE: ICD-10-CM

## 2021-01-01 DIAGNOSIS — K21.9 GASTROESOPHAGEAL REFLUX DISEASE WITHOUT ESOPHAGITIS: ICD-10-CM

## 2021-01-01 DIAGNOSIS — K74.60 CIRRHOSIS OF LIVER WITH ASCITES, UNSPECIFIED HEPATIC CIRRHOSIS TYPE (H): ICD-10-CM

## 2021-01-01 DIAGNOSIS — C22.8 MALIGNANT NEOPLASM OF LIVER, PRIMARY (H): ICD-10-CM

## 2021-01-01 DIAGNOSIS — R52 PAIN: ICD-10-CM

## 2021-01-01 DIAGNOSIS — K50.919 CROHN'S DISEASE WITH COMPLICATION, UNSPECIFIED GASTROINTESTINAL TRACT LOCATION (H): ICD-10-CM

## 2021-01-01 DIAGNOSIS — Z91.89 FRAMINGHAM CARDIAC RISK 10-20% IN NEXT 10 YEARS: ICD-10-CM

## 2021-01-01 DIAGNOSIS — R05.9 COUGH: Primary | ICD-10-CM

## 2021-01-01 DIAGNOSIS — Z87.891 PERSONAL HISTORY OF TOBACCO USE, PRESENTING HAZARDS TO HEALTH: ICD-10-CM

## 2021-01-01 DIAGNOSIS — R18.8 CIRRHOSIS OF LIVER WITH ASCITES, UNSPECIFIED HEPATIC CIRRHOSIS TYPE (H): Primary | ICD-10-CM

## 2021-01-01 DIAGNOSIS — K70.31 ALCOHOLIC CIRRHOSIS OF LIVER WITH ASCITES (H): Primary | ICD-10-CM

## 2021-01-01 DIAGNOSIS — D51.0 PERNICIOUS ANEMIA: Chronic | ICD-10-CM

## 2021-01-01 DIAGNOSIS — R18.8 OTHER ASCITES: ICD-10-CM

## 2021-01-01 DIAGNOSIS — C22.8 MALIGNANT NEOPLASM OF LIVER, PRIMARY (H): Primary | ICD-10-CM

## 2021-01-01 DIAGNOSIS — R18.0 MALIGNANT ASCITES (H): ICD-10-CM

## 2021-01-01 DIAGNOSIS — K74.60 CIRRHOSIS OF LIVER WITH ASCITES, UNSPECIFIED HEPATIC CIRRHOSIS TYPE (H): Primary | ICD-10-CM

## 2021-01-01 DIAGNOSIS — G47.00 INSOMNIA, UNSPECIFIED TYPE: Primary | ICD-10-CM

## 2021-01-01 LAB
% MONONUCLEAR CELLS, BODY FLUID: 11 %
% MONONUCLEAR CELLS, BODY FLUID: 82 %
ALBUMIN FLD-MCNC: 0.1 G/DL
ALBUMIN FLD-MCNC: 0.1 G/DL
ALBUMIN SERPL-MCNC: 2.8 G/DL (ref 3.5–5.7)
ALBUMIN SERPL-MCNC: 2.9 G/DL (ref 3.5–5.7)
ALP SERPL-CCNC: 160 U/L (ref 34–104)
ALP SERPL-CCNC: 205 U/L (ref 34–104)
ALT SERPL W P-5'-P-CCNC: 53 U/L (ref 7–52)
ALT SERPL W P-5'-P-CCNC: 60 U/L (ref 7–52)
ANION GAP SERPL CALCULATED.3IONS-SCNC: 10 MMOL/L (ref 3–14)
ANION GAP SERPL CALCULATED.3IONS-SCNC: 7 MMOL/L (ref 3–14)
APPEARANCE FLD: ABNORMAL
APPEARANCE FLD: ABNORMAL
AST SERPL W P-5'-P-CCNC: 53 U/L (ref 13–39)
AST SERPL W P-5'-P-CCNC: 70 U/L (ref 13–39)
BACTERIA BLD CULT: NO GROWTH
BACTERIA BLD CULT: NO GROWTH
BACTERIA FLD CULT: NO GROWTH
BACTERIA FLD CULT: NO GROWTH
BASOPHILS # BLD AUTO: 0 10E3/UL (ref 0–0.2)
BASOPHILS # BLD AUTO: 0 10E3/UL (ref 0–0.2)
BASOPHILS NFR BLD AUTO: 0 %
BASOPHILS NFR BLD AUTO: 0 %
BILIRUB SERPL-MCNC: 2.3 MG/DL (ref 0.3–1)
BILIRUB SERPL-MCNC: 3.9 MG/DL (ref 0.3–1)
BUN SERPL-MCNC: 18 MG/DL (ref 7–25)
BUN SERPL-MCNC: 19 MG/DL (ref 7–25)
CALCIUM SERPL-MCNC: 8.5 MG/DL (ref 8.6–10.3)
CALCIUM SERPL-MCNC: 8.7 MG/DL (ref 8.6–10.3)
CHLORIDE BLD-SCNC: 103 MMOL/L (ref 98–107)
CHLORIDE BLD-SCNC: 106 MMOL/L (ref 98–107)
CO2 SERPL-SCNC: 21 MMOL/L (ref 21–31)
CO2 SERPL-SCNC: 23 MMOL/L (ref 21–31)
COLOR FLD: YELLOW
COLOR FLD: YELLOW
CREAT SERPL-MCNC: 0.83 MG/DL (ref 0.7–1.3)
CREAT SERPL-MCNC: 1 MG/DL (ref 0.7–1.3)
CRP SERPL-MCNC: 9.6 MG/L
EOSINOPHIL # BLD AUTO: 0 10E3/UL (ref 0–0.7)
EOSINOPHIL # BLD AUTO: 0 10E3/UL (ref 0–0.7)
EOSINOPHIL NFR BLD AUTO: 0 %
EOSINOPHIL NFR BLD AUTO: 0 %
ERYTHROCYTE [DISTWIDTH] IN BLOOD BY AUTOMATED COUNT: 16.9 % (ref 10–15)
ERYTHROCYTE [DISTWIDTH] IN BLOOD BY AUTOMATED COUNT: 17.7 % (ref 10–15)
GFR SERPL CREATININE-BSD FRML MDRD: 77 ML/MIN/1.73M2
GFR SERPL CREATININE-BSD FRML MDRD: 90 ML/MIN/1.73M2
GLUCOSE BLD-MCNC: 102 MG/DL (ref 70–105)
GLUCOSE BLD-MCNC: 175 MG/DL (ref 70–105)
GRAM STAIN RESULT: NORMAL
HCT VFR BLD AUTO: 33.5 % (ref 40–53)
HCT VFR BLD AUTO: 35 % (ref 40–53)
HGB BLD-MCNC: 10.6 G/DL (ref 13.3–17.7)
HGB BLD-MCNC: 11.6 G/DL (ref 13.3–17.7)
IMM GRANULOCYTES # BLD: 0 10E3/UL
IMM GRANULOCYTES # BLD: 0.1 10E3/UL
IMM GRANULOCYTES NFR BLD: 0 %
IMM GRANULOCYTES NFR BLD: 1 %
INR PPP: 1.44 (ref 0.85–1.15)
INR PPP: 1.57 (ref 0.85–1.15)
LACTATE SERPL-SCNC: 2.5 MMOL/L (ref 0.7–2)
LACTATE SERPL-SCNC: 3.8 MMOL/L (ref 0.7–2)
LDH SERPL L TO P-CCNC: 223 U/L (ref 140–271)
LYMPHOCYTES # BLD AUTO: 0.2 10E3/UL (ref 0.8–5.3)
LYMPHOCYTES # BLD AUTO: 0.3 10E3/UL (ref 0.8–5.3)
LYMPHOCYTES NFR BLD AUTO: 2 %
LYMPHOCYTES NFR BLD AUTO: 3 %
MAGNESIUM SERPL-MCNC: 1.7 MG/DL (ref 1.9–2.7)
MAGNESIUM SERPL-MCNC: 2 MG/DL (ref 1.9–2.7)
MCH RBC QN AUTO: 28.6 PG (ref 26.5–33)
MCH RBC QN AUTO: 29.7 PG (ref 26.5–33)
MCHC RBC AUTO-ENTMCNC: 31.6 G/DL (ref 31.5–36.5)
MCHC RBC AUTO-ENTMCNC: 33.1 G/DL (ref 31.5–36.5)
MCV RBC AUTO: 90 FL (ref 78–100)
MCV RBC AUTO: 90 FL (ref 78–100)
MONOCYTES # BLD AUTO: 0.4 10E3/UL (ref 0–1.3)
MONOCYTES # BLD AUTO: 0.7 10E3/UL (ref 0–1.3)
MONOCYTES NFR BLD AUTO: 4 %
MONOCYTES NFR BLD AUTO: 7 %
NEUTROPHILS # BLD AUTO: 15.2 10E3/UL (ref 1.6–8.3)
NEUTROPHILS # BLD AUTO: 5.6 10E3/UL (ref 1.6–8.3)
NEUTROPHILS NFR BLD AUTO: 90 %
NEUTROPHILS NFR BLD AUTO: 93 %
NEUTS BAND NFR FLD MANUAL: 18 %
NEUTS BAND NFR FLD MANUAL: 89 %
NRBC # BLD AUTO: 0 10E3/UL
NRBC # BLD AUTO: 0 10E3/UL
NRBC BLD AUTO-RTO: 0 /100
NRBC BLD AUTO-RTO: 0 /100
PLATELET # BLD AUTO: 65 10E3/UL (ref 150–450)
PLATELET # BLD AUTO: 77 10E3/UL (ref 150–450)
POTASSIUM BLD-SCNC: 3.5 MMOL/L (ref 3.5–5.1)
POTASSIUM BLD-SCNC: 4.2 MMOL/L (ref 3.5–5.1)
PROT FLD-MCNC: 0.6 G/DL
PROT FLD-MCNC: 0.7 G/DL
PROT SERPL-MCNC: 5.5 G/DL (ref 6.4–8.9)
PROT SERPL-MCNC: 5.9 G/DL (ref 6.4–8.9)
RBC # BLD AUTO: 3.71 10E6/UL (ref 4.4–5.9)
RBC # BLD AUTO: 3.9 10E6/UL (ref 4.4–5.9)
RBC # FLD: 0 /UL
RBC # FLD: <2000 /UL
SARS-COV-2 RNA RESP QL NAA+PROBE: NEGATIVE
SODIUM SERPL-SCNC: 134 MMOL/L (ref 134–144)
SODIUM SERPL-SCNC: 136 MMOL/L (ref 134–144)
TROPONIN I SERPL-MCNC: 6.4 PG/ML (ref 0–34)
WBC # BLD AUTO: 16.4 10E3/UL (ref 4–11)
WBC # BLD AUTO: 6.2 10E3/UL (ref 4–11)
WBC # FLD AUTO: 149 /UL
WBC # FLD AUTO: 4275 /UL

## 2021-01-01 PROCEDURE — 87205 SMEAR GRAM STAIN: CPT | Performed by: PHYSICIAN ASSISTANT

## 2021-01-01 PROCEDURE — 258N000003 HC RX IP 258 OP 636: Performed by: STUDENT IN AN ORGANIZED HEALTH CARE EDUCATION/TRAINING PROGRAM

## 2021-01-01 PROCEDURE — 87070 CULTURE OTHR SPECIMN AEROBIC: CPT | Mod: XU | Performed by: STUDENT IN AN ORGANIZED HEALTH CARE EDUCATION/TRAINING PROGRAM

## 2021-01-01 PROCEDURE — 83615 LACTATE (LD) (LDH) ENZYME: CPT | Performed by: STUDENT IN AN ORGANIZED HEALTH CARE EDUCATION/TRAINING PROGRAM

## 2021-01-01 PROCEDURE — 36415 COLL VENOUS BLD VENIPUNCTURE: CPT | Performed by: STUDENT IN AN ORGANIZED HEALTH CARE EDUCATION/TRAINING PROGRAM

## 2021-01-01 PROCEDURE — 99214 OFFICE O/P EST MOD 30 MIN: CPT | Performed by: INTERNAL MEDICINE

## 2021-01-01 PROCEDURE — 87070 CULTURE OTHR SPECIMN AEROBIC: CPT | Performed by: PHYSICIAN ASSISTANT

## 2021-01-01 PROCEDURE — 49083 ABD PARACENTESIS W/IMAGING: CPT

## 2021-01-01 PROCEDURE — 83735 ASSAY OF MAGNESIUM: CPT | Performed by: STUDENT IN AN ORGANIZED HEALTH CARE EDUCATION/TRAINING PROGRAM

## 2021-01-01 PROCEDURE — 99284 EMERGENCY DEPT VISIT MOD MDM: CPT | Performed by: PHYSICIAN ASSISTANT

## 2021-01-01 PROCEDURE — 99284 EMERGENCY DEPT VISIT MOD MDM: CPT | Mod: 25 | Performed by: PHYSICIAN ASSISTANT

## 2021-01-01 PROCEDURE — 84157 ASSAY OF PROTEIN OTHER: CPT | Performed by: PHYSICIAN ASSISTANT

## 2021-01-01 PROCEDURE — U0005 INFEC AGEN DETEC AMPLI PROBE: HCPCS | Performed by: STUDENT IN AN ORGANIZED HEALTH CARE EDUCATION/TRAINING PROGRAM

## 2021-01-01 PROCEDURE — 87040 BLOOD CULTURE FOR BACTERIA: CPT | Mod: 91 | Performed by: STUDENT IN AN ORGANIZED HEALTH CARE EDUCATION/TRAINING PROGRAM

## 2021-01-01 PROCEDURE — 85610 PROTHROMBIN TIME: CPT | Performed by: PHYSICIAN ASSISTANT

## 2021-01-01 PROCEDURE — 272N000047 US PARACENTESIS

## 2021-01-01 PROCEDURE — C9803 HOPD COVID-19 SPEC COLLECT: HCPCS | Performed by: STUDENT IN AN ORGANIZED HEALTH CARE EDUCATION/TRAINING PROGRAM

## 2021-01-01 PROCEDURE — 96366 THER/PROPH/DIAG IV INF ADDON: CPT | Mod: XU | Performed by: STUDENT IN AN ORGANIZED HEALTH CARE EDUCATION/TRAINING PROGRAM

## 2021-01-01 PROCEDURE — G0463 HOSPITAL OUTPT CLINIC VISIT: HCPCS

## 2021-01-01 PROCEDURE — 89050 BODY FLUID CELL COUNT: CPT | Performed by: STUDENT IN AN ORGANIZED HEALTH CARE EDUCATION/TRAINING PROGRAM

## 2021-01-01 PROCEDURE — 82040 ASSAY OF SERUM ALBUMIN: CPT | Performed by: PHYSICIAN ASSISTANT

## 2021-01-01 PROCEDURE — 250N000011 HC RX IP 250 OP 636: Performed by: STUDENT IN AN ORGANIZED HEALTH CARE EDUCATION/TRAINING PROGRAM

## 2021-01-01 PROCEDURE — 96367 TX/PROPH/DG ADDL SEQ IV INF: CPT | Performed by: STUDENT IN AN ORGANIZED HEALTH CARE EDUCATION/TRAINING PROGRAM

## 2021-01-01 PROCEDURE — 96366 THER/PROPH/DIAG IV INF ADDON: CPT | Performed by: STUDENT IN AN ORGANIZED HEALTH CARE EDUCATION/TRAINING PROGRAM

## 2021-01-01 PROCEDURE — 85610 PROTHROMBIN TIME: CPT | Performed by: STUDENT IN AN ORGANIZED HEALTH CARE EDUCATION/TRAINING PROGRAM

## 2021-01-01 PROCEDURE — 250N000009 HC RX 250: Performed by: RADIOLOGY

## 2021-01-01 PROCEDURE — 96365 THER/PROPH/DIAG IV INF INIT: CPT | Performed by: STUDENT IN AN ORGANIZED HEALTH CARE EDUCATION/TRAINING PROGRAM

## 2021-01-01 PROCEDURE — 36415 COLL VENOUS BLD VENIPUNCTURE: CPT | Performed by: PHYSICIAN ASSISTANT

## 2021-01-01 PROCEDURE — 272N000042 US PARACENTESIS

## 2021-01-01 PROCEDURE — 250N000009 HC RX 250: Performed by: STUDENT IN AN ORGANIZED HEALTH CARE EDUCATION/TRAINING PROGRAM

## 2021-01-01 PROCEDURE — 82040 ASSAY OF SERUM ALBUMIN: CPT | Performed by: STUDENT IN AN ORGANIZED HEALTH CARE EDUCATION/TRAINING PROGRAM

## 2021-01-01 PROCEDURE — 36416 COLLJ CAPILLARY BLOOD SPEC: CPT | Performed by: STUDENT IN AN ORGANIZED HEALTH CARE EDUCATION/TRAINING PROGRAM

## 2021-01-01 PROCEDURE — 85025 COMPLETE CBC W/AUTO DIFF WBC: CPT | Performed by: PHYSICIAN ASSISTANT

## 2021-01-01 PROCEDURE — 83735 ASSAY OF MAGNESIUM: CPT | Performed by: PHYSICIAN ASSISTANT

## 2021-01-01 PROCEDURE — 84484 ASSAY OF TROPONIN QUANT: CPT | Performed by: PHYSICIAN ASSISTANT

## 2021-01-01 PROCEDURE — 99285 EMERGENCY DEPT VISIT HI MDM: CPT | Mod: 25 | Performed by: STUDENT IN AN ORGANIZED HEALTH CARE EDUCATION/TRAINING PROGRAM

## 2021-01-01 PROCEDURE — 82042 OTHER SOURCE ALBUMIN QUAN EA: CPT | Performed by: STUDENT IN AN ORGANIZED HEALTH CARE EDUCATION/TRAINING PROGRAM

## 2021-01-01 PROCEDURE — 89051 BODY FLUID CELL COUNT: CPT | Performed by: PHYSICIAN ASSISTANT

## 2021-01-01 PROCEDURE — 82042 OTHER SOURCE ALBUMIN QUAN EA: CPT | Performed by: PHYSICIAN ASSISTANT

## 2021-01-01 PROCEDURE — 89051 BODY FLUID CELL COUNT: CPT | Performed by: STUDENT IN AN ORGANIZED HEALTH CARE EDUCATION/TRAINING PROGRAM

## 2021-01-01 PROCEDURE — 86140 C-REACTIVE PROTEIN: CPT | Performed by: PHYSICIAN ASSISTANT

## 2021-01-01 PROCEDURE — 87205 SMEAR GRAM STAIN: CPT | Performed by: STUDENT IN AN ORGANIZED HEALTH CARE EDUCATION/TRAINING PROGRAM

## 2021-01-01 PROCEDURE — 99283 EMERGENCY DEPT VISIT LOW MDM: CPT | Performed by: STUDENT IN AN ORGANIZED HEALTH CARE EDUCATION/TRAINING PROGRAM

## 2021-01-01 PROCEDURE — 99283 EMERGENCY DEPT VISIT LOW MDM: CPT | Performed by: PHYSICIAN ASSISTANT

## 2021-01-01 PROCEDURE — 85004 AUTOMATED DIFF WBC COUNT: CPT | Performed by: STUDENT IN AN ORGANIZED HEALTH CARE EDUCATION/TRAINING PROGRAM

## 2021-01-01 PROCEDURE — 89050 BODY FLUID CELL COUNT: CPT | Performed by: PHYSICIAN ASSISTANT

## 2021-01-01 PROCEDURE — 84157 ASSAY OF PROTEIN OTHER: CPT | Performed by: STUDENT IN AN ORGANIZED HEALTH CARE EDUCATION/TRAINING PROGRAM

## 2021-01-01 PROCEDURE — 99284 EMERGENCY DEPT VISIT MOD MDM: CPT | Mod: 25 | Performed by: STUDENT IN AN ORGANIZED HEALTH CARE EDUCATION/TRAINING PROGRAM

## 2021-01-01 PROCEDURE — 99284 EMERGENCY DEPT VISIT MOD MDM: CPT | Performed by: STUDENT IN AN ORGANIZED HEALTH CARE EDUCATION/TRAINING PROGRAM

## 2021-01-01 PROCEDURE — 83605 ASSAY OF LACTIC ACID: CPT | Mod: 91 | Performed by: STUDENT IN AN ORGANIZED HEALTH CARE EDUCATION/TRAINING PROGRAM

## 2021-01-01 RX ORDER — HYDROMORPHONE HYDROCHLORIDE 1 MG/ML
0.5 INJECTION, SOLUTION INTRAMUSCULAR; INTRAVENOUS; SUBCUTANEOUS
Status: DISCONTINUED | OUTPATIENT
Start: 2021-01-01 | End: 2021-01-01 | Stop reason: HOSPADM

## 2021-01-01 RX ORDER — RIFAXIMIN 550 MG/1
550 TABLET ORAL
COMMUNITY
Start: 2021-01-01

## 2021-01-01 RX ORDER — OXYCODONE AND ACETAMINOPHEN 5; 325 MG/1; MG/1
1-2 TABLET ORAL EVERY 6 HOURS PRN
Qty: 90 TABLET | Refills: 0 | Status: SHIPPED | OUTPATIENT
Start: 2021-01-01 | End: 2021-01-01

## 2021-01-01 RX ORDER — PANTOPRAZOLE SODIUM 40 MG/1
TABLET, DELAYED RELEASE ORAL
Qty: 180 TABLET | Refills: 3 | Status: SHIPPED | OUTPATIENT
Start: 2021-01-01

## 2021-01-01 RX ORDER — LACTULOSE 10 G/15ML
30 SOLUTION ORAL
COMMUNITY
Start: 2021-01-01

## 2021-01-01 RX ORDER — SODIUM CHLORIDE 9 MG/ML
INJECTION, SOLUTION INTRAVENOUS CONTINUOUS
Status: DISCONTINUED | OUTPATIENT
Start: 2021-01-01 | End: 2021-01-01 | Stop reason: HOSPADM

## 2021-01-01 RX ORDER — CIPROFLOXACIN 500 MG/1
500 TABLET, FILM COATED ORAL 2 TIMES DAILY
Qty: 10 TABLET | Refills: 0 | Status: SHIPPED | OUTPATIENT
Start: 2021-01-01 | End: 2021-01-01

## 2021-01-01 RX ORDER — OXYCODONE AND ACETAMINOPHEN 5; 325 MG/1; MG/1
1-2 TABLET ORAL EVERY 6 HOURS PRN
Qty: 30 TABLET | Refills: 0 | Status: SHIPPED | OUTPATIENT
Start: 2021-01-01 | End: 2021-01-01

## 2021-01-01 RX ORDER — CODEINE PHOSPHATE AND GUAIFENESIN 10; 100 MG/5ML; MG/5ML
1-2 SOLUTION ORAL EVERY 4 HOURS PRN
Qty: 118 ML | Refills: 1 | Status: SHIPPED | OUTPATIENT
Start: 2021-01-01

## 2021-01-01 RX ORDER — ATORVASTATIN CALCIUM 10 MG/1
10 TABLET, FILM COATED ORAL DAILY
Qty: 90 TABLET | Refills: 3 | Status: SHIPPED | OUTPATIENT
Start: 2021-01-01

## 2021-01-01 RX ORDER — AMOXICILLIN 250 MG
1-2 CAPSULE ORAL 2 TIMES DAILY
Qty: 30 TABLET | Refills: 0 | Status: SHIPPED | OUTPATIENT
Start: 2021-01-01

## 2021-01-01 RX ORDER — CEFTRIAXONE SODIUM 2 G/50ML
2 INJECTION, SOLUTION INTRAVENOUS ONCE
Status: COMPLETED | OUTPATIENT
Start: 2021-01-01 | End: 2021-01-01

## 2021-01-01 RX ORDER — BENZONATATE 100 MG/1
100 CAPSULE ORAL 3 TIMES DAILY PRN
Qty: 30 CAPSULE | Refills: 11 | Status: SHIPPED | OUTPATIENT
Start: 2021-01-01

## 2021-01-01 RX ORDER — CYANOCOBALAMIN 1000 UG/ML
INJECTION, SOLUTION INTRAMUSCULAR; SUBCUTANEOUS
Qty: 4 ML | Refills: 3 | Status: SHIPPED | OUTPATIENT
Start: 2021-01-01

## 2021-01-01 RX ORDER — ALPRAZOLAM 0.25 MG
0.25 TABLET ORAL
Qty: 30 TABLET | Refills: 5 | Status: SHIPPED | OUTPATIENT
Start: 2021-01-01

## 2021-01-01 RX ORDER — CIPROFLOXACIN 500 MG/1
500 TABLET, FILM COATED ORAL
COMMUNITY
Start: 2021-01-01 | End: 2022-10-06

## 2021-01-01 RX ORDER — SPIRONOLACTONE 50 MG/1
50 TABLET, FILM COATED ORAL DAILY
COMMUNITY
Start: 2021-01-01 | End: 2021-01-01

## 2021-01-01 RX ORDER — MAGNESIUM SULFATE 1 G/100ML
1 INJECTION INTRAVENOUS ONCE
Status: COMPLETED | OUTPATIENT
Start: 2021-01-01 | End: 2021-01-01

## 2021-01-01 RX ORDER — LIDOCAINE 40 MG/G
CREAM TOPICAL
Status: CANCELLED | OUTPATIENT
Start: 2021-01-01

## 2021-01-01 RX ORDER — ONDANSETRON 2 MG/ML
4 INJECTION INTRAMUSCULAR; INTRAVENOUS ONCE
Status: COMPLETED | OUTPATIENT
Start: 2021-01-01 | End: 2021-01-01

## 2021-01-01 RX ORDER — PREDNISONE 20 MG/1
20 TABLET ORAL DAILY
Qty: 90 TABLET | Refills: 3 | Status: SHIPPED | OUTPATIENT
Start: 2021-01-01

## 2021-01-01 RX ORDER — PREDNISONE 10 MG/1
20 TABLET ORAL DAILY
COMMUNITY
Start: 2021-01-01 | End: 2021-01-01

## 2021-01-01 RX ORDER — ALBUMIN (HUMAN) 12.5 G/50ML
12.5-5 SOLUTION INTRAVENOUS ONCE
Status: CANCELLED | OUTPATIENT
Start: 2021-01-01 | End: 2021-01-01

## 2021-01-01 RX ORDER — CAPECITABINE 500 MG/1
1000 TABLET, FILM COATED ORAL
OUTPATIENT
Start: 2021-01-01

## 2021-01-01 RX ORDER — CEFAZOLIN SODIUM 1 G/50ML
2000 SOLUTION INTRAVENOUS ONCE
Status: COMPLETED | OUTPATIENT
Start: 2021-01-01 | End: 2021-01-01

## 2021-01-01 RX ORDER — FENTANYL CITRATE 50 UG/ML
50 INJECTION, SOLUTION INTRAMUSCULAR; INTRAVENOUS ONCE
Status: COMPLETED | OUTPATIENT
Start: 2021-01-01 | End: 2021-01-01

## 2021-01-01 RX ORDER — OXYCODONE AND ACETAMINOPHEN 5; 325 MG/1; MG/1
1-2 TABLET ORAL EVERY 6 HOURS PRN
Qty: 90 TABLET | Refills: 0 | Status: SHIPPED | OUTPATIENT
Start: 2021-01-01

## 2021-01-01 RX ORDER — FUROSEMIDE 40 MG
TABLET ORAL
Qty: 90 TABLET | Refills: 0 | Status: SHIPPED | OUTPATIENT
Start: 2021-01-01 | End: 2021-01-01

## 2021-01-01 RX ADMIN — LIDOCAINE HYDROCHLORIDE 10 ML: 10 INJECTION, SOLUTION INFILTRATION; PERINEURAL at 11:06

## 2021-01-01 RX ADMIN — VANCOMYCIN HYDROCHLORIDE 2000 MG: 1 INJECTION, POWDER, LYOPHILIZED, FOR SOLUTION INTRAVENOUS at 18:34

## 2021-01-01 RX ADMIN — VANCOMYCIN HYDROCHLORIDE 1500 MG: 1 INJECTION, POWDER, LYOPHILIZED, FOR SOLUTION INTRAVENOUS at 13:27

## 2021-01-01 RX ADMIN — CEFTRIAXONE SODIUM 2 G: 2 INJECTION, SOLUTION INTRAVENOUS at 13:00

## 2021-01-01 RX ADMIN — LIDOCAINE HYDROCHLORIDE 10 ML: 10 INJECTION, SOLUTION INFILTRATION; PERINEURAL at 10:42

## 2021-01-01 RX ADMIN — LIDOCAINE HYDROCHLORIDE 10 ML: 10 INJECTION, SOLUTION EPIDURAL; INFILTRATION; INTRACAUDAL; PERINEURAL at 13:40

## 2021-01-01 RX ADMIN — LIDOCAINE HYDROCHLORIDE 5 ML: 10 INJECTION, SOLUTION EPIDURAL; INFILTRATION; INTRACAUDAL; PERINEURAL at 16:32

## 2021-01-01 RX ADMIN — MAGNESIUM SULFATE HEPTAHYDRATE 1 G: 1 INJECTION, SOLUTION INTRAVENOUS at 17:06

## 2021-01-01 RX ADMIN — ONDANSETRON HYDROCHLORIDE 4 MG: 2 INJECTION, SOLUTION INTRAMUSCULAR; INTRAVENOUS at 16:29

## 2021-01-01 RX ADMIN — CEFTRIAXONE SODIUM 2 G: 2 INJECTION, SOLUTION INTRAVENOUS at 18:05

## 2021-01-01 RX ADMIN — FENTANYL CITRATE 50 MCG: 50 INJECTION, SOLUTION INTRAMUSCULAR; INTRAVENOUS at 18:41

## 2021-01-01 ASSESSMENT — ENCOUNTER SYMPTOMS
SEIZURES: 0
BACK PAIN: 0
FACIAL SWELLING: 0
FEVER: 0
ABDOMINAL PAIN: 1
CONFUSION: 0
EYE PAIN: 0
TREMORS: 0
FLANK PAIN: 0
FACIAL ASYMMETRY: 0
STRIDOR: 0
AGITATION: 0

## 2021-01-01 ASSESSMENT — MIFFLIN-ST. JEOR
SCORE: 1557.75
SCORE: 1556.44
SCORE: 1545.89
SCORE: 1649.43

## 2021-01-01 ASSESSMENT — PATIENT HEALTH QUESTIONNAIRE - PHQ9
SUM OF ALL RESPONSES TO PHQ QUESTIONS 1-9: 3
10. IF YOU CHECKED OFF ANY PROBLEMS, HOW DIFFICULT HAVE THESE PROBLEMS MADE IT FOR YOU TO DO YOUR WORK, TAKE CARE OF THINGS AT HOME, OR GET ALONG WITH OTHER PEOPLE: NOT DIFFICULT AT ALL
SUM OF ALL RESPONSES TO PHQ QUESTIONS 1-9: 3
SUM OF ALL RESPONSES TO PHQ QUESTIONS 1-9: 3

## 2021-01-01 ASSESSMENT — PAIN SCALES - GENERAL
PAINLEVEL: NO PAIN (0)
PAINLEVEL: NO PAIN (0)

## 2021-06-24 NOTE — TELEPHONE ENCOUNTER
Received a fax form [facility] pharmacy-specialty requesting this medication form you by 06/29/2021. I left the fax in your in box for you to look at. It is on his medication reconciliation list  Grace Forrest LPN..................6/24/2021   4:41 PM

## 2021-06-24 NOTE — TELEPHONE ENCOUNTER
This needs to come from his oncologist.    Signed, Perez Adamson MD, FAAP, FACP  Internal Medicine & Pediatrics

## 2021-06-28 NOTE — TELEPHONE ENCOUNTER
[facility] informed of Dr. Adamson's response. They will send it to his Oncologist.     Adriana Eubanks CMA on 6/28/2021 at 10:23 AM

## 2021-08-23 NOTE — TELEPHONE ENCOUNTER
Routing refill request to provider for review/approval because:  Labs out of range:  Sodium    LOV: 6/23/2020  Zaynab Vargas RN on 8/23/2021 at 10:28 AM

## 2021-08-24 NOTE — TELEPHONE ENCOUNTER
I called his wife.  He was sleeping and she didn't want to wake him.      ICD-10-CM    1. Insomnia, unspecified type  G47.00 ALPRAZolam (XANAX) 0.25 MG tablet     oxyCODONE-acetaminophen (PERCOCET) 5-325 MG tablet   2. Pain  R52 ALPRAZolam (XANAX) 0.25 MG tablet     oxyCODONE-acetaminophen (PERCOCET) 5-325 MG tablet   3. Cirrhosis of liver with ascites, unspecified hepatic cirrhosis type (H)  K74.60 ALPRAZolam (XANAX) 0.25 MG tablet    R18.8 oxyCODONE-acetaminophen (PERCOCET) 5-325 MG tablet   4. Cholangiocarcinoma (H)  C22.1 ALPRAZolam (XANAX) 0.25 MG tablet     oxyCODONE-acetaminophen (PERCOCET) 5-325 MG tablet   5. Malignant neoplasm of liver, primary (H)  C22.8 ALPRAZolam (XANAX) 0.25 MG tablet     oxyCODONE-acetaminophen (PERCOCET) 5-325 MG tablet   6. Calculus of gallbladder without cholecystitis without obstruction  K80.20 ALPRAZolam (XANAX) 0.25 MG tablet     oxyCODONE-acetaminophen (PERCOCET) 5-325 MG tablet      Orders Placed This Encounter   Medications     ALPRAZolam (XANAX) 0.25 MG tablet     Sig: Take 1 tablet (0.25 mg) by mouth nightly as needed for sleep     Dispense:  30 tablet     Refill:  5     oxyCODONE-acetaminophen (PERCOCET) 5-325 MG tablet     Sig: Take 1-2 tablets by mouth every 6 hours as needed for severe pain (moderate to severe)     Dispense:  30 tablet     Refill:  0      -- Alprazolam for sleep given liver disease   -- Oxycodone for pain   -- Schedule OV to discuss goals of care/hospice, okay to double book    Signed, Perez Adamson MD, FAAP, FACP  Internal Medicine & Pediatrics

## 2021-08-24 NOTE — TELEPHONE ENCOUNTER
Pt was sent home by [facility] after cancer treatment. There is nothing else that can be done.  Pt is having a hard time sleeping and has some pain.  He would like to get medication for it.  Please call wife back today.      Jerrod Willett on 8/24/2021 at 11:17 AM

## 2021-08-26 NOTE — TELEPHONE ENCOUNTER
Routing refill request to provider for review/approval because:    LVO; 6/23/2020  Patient is due for annual review  Patient noted to have an appointment scheduled for 9/8/2021  Zaynab Vargas RN on 8/26/2021 at 10:24 AM

## 2021-09-08 PROBLEM — Z71.89 DNR (DO NOT RESUSCITATE) DISCUSSION: Status: ACTIVE | Noted: 2021-01-01

## 2021-09-08 NOTE — PROGRESS NOTES
"Subjective   Phi Tabares is a 70 year old male who presents for discuss cancer.  He has been following closely with his specialists at [facility] and the [facility].  He was excited to be heading towards a liver transplant and unfortunately his work-up revealed that he had cancer and he was taken off the transplant list.  He has been told by a specialist that if he gets his Crohn's disease under control he could be a candidate for chemotherapy.  He believes his Crohn's disease is currently well controlled on prednisone and wants to continue that.  He knows that he is nearing the end of his life.  He would consider taking some chemotherapy if recommended to him.  He has had some pain off and on for which the Percocet has been helpful.  He mainly gets the pain in the upper abdomen.  He is having a difficult time sleeping and wakes up in the middle of the night sometimes having a hard time falling back to sleep.  He has not yet tried the alprazolam although he thinks he did  the prescription.    Objective   Vitals: /70 (BP Location: Right arm, Patient Position: Sitting, Cuff Size: Adult Large)   Pulse 84   Temp 97.8  F (36.6  C) (Tympanic)   Resp 16   Ht 1.657 m (5' 5.25\")   Wt 84.5 kg (186 lb 4.8 oz)   SpO2 100%   BMI 30.76 kg/m        Review and Analysis of Data   I personally reviewed the following:  External notes: Yes Specialty notes from the [facility] were reviewed including gastroenterology  Results: Yes Most recent CT chest and abdomen reports from the [facility] are reviewed through the care everywhere  Use of an independent historian: No  Independent review of a test performed by another physician: No  Discussion of management with another physician: No  High risk of morbidity from additional diagnostic testing and/or treatment.    Assessment & Plan   1. Malignant neoplasm of liver, primary (H)  2. Cholangiocarcinoma (H)  We discussed the use of narcotics in the treatment of cancer " associated pain.  No contract is required due to the pain being associated with his malignancy.  I believe the pain is likely caused by referred pain from the liver.  I agree that discussing the situation with his gastroenterologist is a good idea and he will be seeing him next week.  It sounds as though he is also set up with a hematology/oncology consult.  We discussed the possibility of hospice which the patient was not interested in at this time.  A POLST form was completed today indicating DNR/DNI with some interventions that would be requested.    - oxyCODONE-acetaminophen (PERCOCET) 5-325 MG tablet; Take 1-2 tablets by mouth every 6 hours as needed for severe pain (moderate to severe)  Dispense: 90 tablet; Refill: 0    3. Personal history of tobacco use, presenting hazards to health  - benzonatate (TESSALON) 100 MG capsule; Take 1 capsule (100 mg) by mouth 3 times daily as needed for cough  Dispense: 30 capsule; Refill: 11    4. Insomnia, unspecified type  We discussed the use of alprazolam for insomnia.  I think this low-dose will be fine for him to try particularly if he wakes up at night and cannot fall back to sleep.  We did discuss how in the setting of liver failure this could take longer to clear from his system which is why the lower dosage was started.  We discussed the risks of using benzos and narcotics together.    5. Pain  - oxyCODONE-acetaminophen (PERCOCET) 5-325 MG tablet; Take 1-2 tablets by mouth every 6 hours as needed for severe pain (moderate to severe)  Dispense: 90 tablet; Refill: 0    6. Cirrhosis of liver with ascites, unspecified hepatic cirrhosis type (H)  - oxyCODONE-acetaminophen (PERCOCET) 5-325 MG tablet; Take 1-2 tablets by mouth every 6 hours as needed for severe pain (moderate to severe)  Dispense: 90 tablet; Refill: 0    7. Calculus of gallbladder without cholecystitis without obstruction  - oxyCODONE-acetaminophen (PERCOCET) 5-325 MG tablet; Take 1-2 tablets by mouth every  6 hours as needed for severe pain (moderate to severe)  Dispense: 90 tablet; Refill: 0    8. DNR (do not resuscitate) discussion  A POLST form was completed today with the patient, see media tab    9. Crohn's disease with complication, unspecified gastrointestinal tract location (H)  At goal, no change.  - predniSONE (DELTASONE) 20 MG tablet; Take 1 tablet (20 mg) by mouth daily  Dispense: 90 tablet; Refill: 3      Signed, Perez Adamson MD, FAAP, FACP  Internal Medicine & Pediatrics

## 2021-09-08 NOTE — NURSING NOTE
Pt here to update Perez Adamson MD on his cancer treatments.    Kelsey Orona CMA ([facility])......................9/8/2021  11:19 AM       Medication Reconciliation: complete    Kelsey Orona CMA  9/8/2021 11:19 AM     FOOD SECURITY SCREENING QUESTIONS  Hunger Vital Signs:  Within the past 12 months we worried whether our food would run out before we got money to buy more. Never  Within the past 12 months the food we bought just didn't last and we didn't have money to get more. Never  Kelsey Orona CMA 9/8/2021 11:19 AM

## 2021-09-09 NOTE — TELEPHONE ENCOUNTER
Left message to call back.  Kelsey Handley CMA ([facility])   9/9/2021   12:52 PM

## 2021-09-09 NOTE — TELEPHONE ENCOUNTER
Patient was in yesterday and was supposed to get some cough syrup sent to [facility] and it is still not there-PCP is gone can you have someone take care of this----Please call when done as drives a ways In to [facility].

## 2021-09-14 NOTE — TELEPHONE ENCOUNTER
Wife notified rx called to pharmacy.  Kelsey Orona CMA ([facility])......................9/14/2021  4:27 PM

## 2021-09-14 NOTE — TELEPHONE ENCOUNTER
Left message to call back at [phone]. I called other number listed and spoke to his wife Agatha.  Pt is not at home today.  He is seeing a gastroenterologist.  She states pt already has the Tessalon capsules.  He is still coughing and would like some cough medicine.   Kelsey Handley CMA ([facility])   9/14/2021   3:57 PM

## 2021-09-27 NOTE — ED PROVIDER NOTES
History     Chief Complaint   Patient presents with     Bloated     ascites      Abdominal Pain       Phi Tabares is a 70 year old male with history including cholangiocarcinoma who presents with abdominal pain and dyspnea.  Over the past 2 weeks patient has been having worsening ascites and bilateral lower extremity edema. He had been previously following at [place] with gastroenterology/hepatology for his above cancer.  Reports previously being on liver transplant list which it sounds like he is no longer on as of recently per patient.  Reports compliance with his furosemide and spironolactone.  Overnight he did have a mild self resolved sensation of a fever and mild nausea.  His abdominal pain is epigastric and 7/10 in severity.  This in addition to his dyspnea and has made sleeping next to impossible recently.  He has tried home Percocet 2 tabs without significant relief.  Denies current fever, chills, chest pain, nausea, vomiting, dysuria. Endorses chronic diarrhea.    Allergies   Allergen Reactions     Dye [Contrast Dye]      IV Dye, Iodine Containing Contrast Media     Iodine Rash     Shrimp Rash       Patient Active Problem List    Diagnosis Date Noted     DNR (do not resuscitate) discussion 09/08/2021     Priority: Medium     DNR/DNI  POLST completed today       Calculus of bile duct without cholecystitis and without obstruction 07/20/2020     Priority: Medium     Cirrhosis of liver (H) 07/13/2020     Priority: Medium     SBO (small bowel obstruction) (H) 07/11/2020     Priority: Medium     Postoperative state 07/11/2020     Priority: Medium     S/P partial resection of colon 08/22/2019     Priority: Medium     Personal history of immunosuppressive therapy 08/22/2019     Priority: Medium     Exposure to asbestos 08/22/2019     Priority: Medium     Personal history of tobacco use, presenting hazards to health 08/22/2019     Priority: Medium     Iron deficiency anemia, unspecified iron deficiency anemia type  08/22/2019     Priority: Medium     GERD with esophagitis 08/18/2017     Priority: Medium     Crohn's disease with complication (H) 04/26/2017     Priority: Medium     Osteoarthrosis 04/26/2017     Priority: Medium     Pernicious anemia 04/26/2017     Priority: Medium     ACP (advance care planning) 08/01/2016     Priority: Medium     Advance Care Planning 8/1/2016: ACP Review of Chart / Resources Provided:  Reviewed chart for advance care plan.  Phi Tabares Jr has no plan or code status on file however states presence of ACP document. Copy requested. Confirmed code status reflects current choices pending receipt of document/advance care plan review.  Confirmed/documented legally designated decision makers.  Added by Debby Lyn             Hyperlipidemia 06/15/2015     Priority: Medium     Diagnosis updated by automated process. Provider to review and confirm.       ED (erectile dysfunction) 02/21/2014     Priority: Medium     Disorder of bursae and tendons in shoulder region 08/30/2011     Priority: Medium     Problem list name updated by automated process. Provider to review       Greer's esophagus determined by endoscopy 01/01/2011     Priority: Medium     Overview:   Per Dr Scott (GI at [facility])       Osteoporosis 01/01/2011     Priority: Medium     Problem list name updated by automated process. Provider to review       GERD 01/01/2011     Priority: Medium       Past Medical History:   Diagnosis Date     Alcoholic cirrhosis of liver with ascites (H) 7/11/2020       Past Surgical History:   Procedure Laterality Date     ------------OTHER-------------  1980s    colon resection     CATARACT IOL, RT/LT  2017    bilateral removal and lens placed     COLONOSCOPY  09/28/2017 09/28/2017,[facility]     LAPAROSCOPIC CHOLECYSTECTOMY N/A 7/6/2020    Procedure: CHOLECYSTECTOMY, LAPAROSCOPIC;  Surgeon: Ludwig Bella MD;  Location: [facility] OR       Family History   Problem Relation Age of Onset     Breast Cancer  "Mother      Diabetes Father      Thyroid Disease Sister      Diabetes Brother      Thyroid Disease Sister      Gastrointestinal Disease Sister         chrons       Social History     Tobacco Use     Smoking status: Former Smoker     Packs/day: 1.00     Years: 25.00     Pack years: 25.00     Quit date: 2008     Years since quittin.7     Smokeless tobacco: Never Used   Vaping Use     Vaping Use: Never used   Substance Use Topics     Alcohol use: Yes     Alcohol/week: 1.0 standard drinks     Comment: occasionally uses alcohol socially     Drug use: No       Medications:    ALPRAZolam (XANAX) 0.25 MG tablet  atorvastatin (LIPITOR) 10 MG tablet  BD DISP NEEDLES 22G X 1-\" MISC  benzonatate (TESSALON) 100 MG capsule  calcium carbonate-vitamin D (OS-JULIUS) 500-400 MG-UNIT tablet  cyanocobalamin (CYANOCOBALAMIN) 1000 MCG/ML injection  furosemide (LASIX) 40 MG tablet  guaiFENesin-codeine (ROBITUSSIN AC) 100-10 MG/5ML solution  ibuprofen (ADVIL/MOTRIN) 600 MG tablet  oxyCODONE-acetaminophen (PERCOCET) 5-325 MG tablet  pantoprazole (PROTONIX) 40 MG EC tablet  predniSONE (DELTASONE) 20 MG tablet  Probiotic Product (PROBIOTIC ACIDOPHILUS) CAPS  senna-docusate (SENOKOT-S/PERICOLACE) 8.6-50 MG tablet  spironolactone (ALDACTONE) 50 MG tablet        Review of Systems: See HPI for pertinent negatives and positives. All other systems reviewed and found to be negative.    Physical Exam   BP (!) 144/98   Pulse 115   Temp 99.3  F (37.4  C)   Resp 18   Ht 1.676 m (5' 6\")   Wt 84.5 kg (186 lb 4.6 oz)   SpO2 98%   BMI 30.07 kg/m       General: awake, mildly uncomfortable  HEENT: atraumatic, neck supple, mild scleral icterus  Respiratory: normal effort, mild bibasilar crackles  Cardiovascular: Tachycardic, no murmurs  Abdomen: soft, distended, fluid wave, mild epigastric tenderness  Extremities: no deformities, Tenderness; 2+ bilateral lower extremity edema  Skin: warm, dry, no rashes  Neuro: alert, no focal deficits  Psych: " appropriate mood and affect    ED Course      ED Course as of Sep 27 1852   Mon Sep 27, 2021   [address], [facility] on divert. C4 not accepting patient placement requests until after 8p.          Results for orders placed or performed during the hospital encounter of 09/27/21 (from the past 24 hour(s))   CBC with platelets differential    Narrative    The following orders were created for panel order CBC with platelets differential.  Procedure                               Abnormality         Status                     ---------                               -----------         ------                     CBC with platelets and d...[[id number]]  Abnormal            Final result                 Please view results for these tests on the individual orders.   INR   Result Value Ref Range    INR 1.57 (H) 0.85 - 1.15   Comprehensive metabolic panel   Result Value Ref Range    Sodium 134 134 - 144 mmol/L    Potassium 3.5 3.5 - 5.1 mmol/L    Chloride 103 98 - 107 mmol/L    Carbon Dioxide (CO2) 21 21 - 31 mmol/L    Anion Gap 10 3 - 14 mmol/L    Urea Nitrogen 19 7 - 25 mg/dL    Creatinine 1.00 0.70 - 1.30 mg/dL    Calcium 8.7 8.6 - 10.3 mg/dL    Glucose 175 (H) 70 - 105 mg/dL    Alkaline Phosphatase 205 (H) 34 - 104 U/L    AST 53 (H) 13 - 39 U/L    ALT 53 (H) 7 - 52 U/L    Protein Total 5.9 (L) 6.4 - 8.9 g/dL    Albumin 2.9 (L) 3.5 - 5.7 g/dL    Bilirubin Total 3.9 (H) 0.3 - 1.0 mg/dL    GFR Estimate 77 >60 mL/min/1.73m2   Magnesium   Result Value Ref Range    Magnesium 1.7 (L) 1.9 - 2.7 mg/dL   CBC with platelets and differential   Result Value Ref Range    WBC Count 16.4 (H) 4.0 - 11.0 10e3/uL    RBC Count 3.90 (L) 4.40 - 5.90 10e6/uL    Hemoglobin 11.6 (L) 13.3 - 17.7 g/dL    Hematocrit 35.0 (L) 40.0 - 53.0 %    MCV 90 78 - 100 fL    MCH 29.7 26.5 - 33.0 pg    MCHC 33.1 31.5 - 36.5 g/dL    RDW 17.7 (H) 10.0 - 15.0 %    Platelet Count 77 (L) 150 - 450 10e3/uL    % Neutrophils 93 %    % Lymphocytes 2 %     % Monocytes 4 %    % Eosinophils 0 %    % Basophils 0 %    % Immature Granulocytes 1 %    NRBCs per 100 WBC 0 <1 /100    Absolute Neutrophils 15.2 (H) 1.6 - 8.3 10e3/uL    Absolute Lymphocytes 0.3 (L) 0.8 - 5.3 10e3/uL    Absolute Monocytes 0.7 0.0 - 1.3 10e3/uL    Absolute Eosinophils 0.0 0.0 - 0.7 10e3/uL    Absolute Basophils 0.0 0.0 - 0.2 10e3/uL    Absolute Immature Granulocytes 0.1 (H) <=0.0 10e3/uL    Absolute NRBCs 0.0 10e3/uL   Lactic acid whole blood   Result Value Ref Range    Lactic Acid 3.8 (H) 0.7 - 2.0 mmol/L   Lactate Dehydrogenase   Result Value Ref Range    Lactate Dehydrogenase 223 140 - 271 U/L   Cell count with differential fluid    Narrative    The following orders were created for panel order Cell count with differential fluid.  Procedure                               Abnormality         Status                     ---------                               -----------         ------                     Cell Count Body Fluid[[id number]]        Abnormal            Final result               Differential Body Fluid[[id number]]                          Final result                 Please view results for these tests on the individual orders.   Gram stain    Specimen: Abdomen; Ascites Fluid   Result Value Ref Range    Gram Stain Result 4+ PMNs/low power field     Gram Stain Result No organisms seen    Cell Count Body Fluid   Result Value Ref Range    Color Yellow (A) Colorless    Clarity Hazy (A) Clear, Bloody    Total Nucleated Cells 4,275 /uL    RBC Count <2,000 /uL    Narrative    No reference ranges have been established.  This result  should be interpreted in the context of the patient's clinical condition and   compared to simultaneous measurement in the patient's blood.         Differential Body Fluid   Result Value Ref Range    % Polynuclear Cells, Body Fluid 89 %    % Mononuclear Cells, Body Fluid 11 %    Narrative    No reference ranges have been established.  This result   should be  interpreted in the context of the patient's clinical condition and   compared to simultaneous measurement in the patient's blood.   Lactic acid whole blood   Result Value Ref Range    Lactic Acid 2.5 (H) 0.7 - 2.0 mmol/L       Medications   HYDROmorphone (PF) (DILAUDID) injection 0.5 mg (has no administration in time range)   vancomycin (VANCOCIN) 2,000 mg in sodium chloride 0.9 % 500 mL intermittent infusion (2,000 mg Intravenous New Bag 9/27/21 1834)   ondansetron (ZOFRAN) injection 4 mg (4 mg Intravenous Given 9/27/21 1629)   lidocaine 1 % 5 mL (5 mLs Intradermal Given 9/27/21 1632)   magnesium sulfate 1 gm in 100mL D5W intermittent infusion (0 g Intravenous Stopped 9/27/21 1742)   cefTRIAXone IN D5W (ROCEPHIN) intermittent infusion 2 g (0 g Intravenous Stopped 9/27/21 1834)   fentaNYL (PF) (SUBLIMAZE) injection 50 mcg (50 mcg Intravenous Given 9/27/21 1841)       Assessments & Plan (with Medical Decision Making)     I have reviewed the nursing notes.    70 year old male evaluated for progressive ascites and lower extremity edema and shortness of breath.  Afebrile, tachycardic, hemodynamically stable.  Large abdominal fluid wave with mild epigastric tenderness, and pitting edema of his bilateral lower extremities.  History includes cholangiocarcinoma.  Labs remarkable for elevated lactate, leukocytosis.  Paracentesis performed with fluid concerning for SBP with WBC 4275.  Patient treated with vancomycin and ceftriaxone.  Lactate was rechecked and decreased from 3.8 to 2.5.  Inpatient admission placement unsuccessful, and therefore plan will be to have patient return tomorrow afternoon for another course of IV antibiotics.  At this time there will be hopefully more information regarding labs and patient may be placed on p.o. antibiotics potentially.  Plan discussed with patient who is in agreement and the importance of his return was strictly discussed.  ED return precautions provided.  Patient will be discharged  home after his vancomycin is complete.  It is currently shift change and he was signed out to night shift provider for any final management prior to discharge.     I have reviewed the findings, diagnosis, plan with the patient.      New Prescriptions    No medications on file       Final diagnoses:   SBP (spontaneous bacterial peritonitis) (H)   Cholangiocarcinoma (H)   Malignant ascites       9/27/2021   [facility]     Austyn Hernandez MD  09/27/21 0536

## 2021-09-27 NOTE — ED TRIAGE NOTES
ED Nursing Triage Note (General)   ________________________________    Phi Tabares is a 70 year old Male that presents to triage private car  With history of abdominal distention, ascites, never had paracentesis but Dr. Adamson did mention that he might need this soon. Abdomin is very large, causing discomfort and SOB reported by patient   Significant symptoms had onset 1 week(s) ago.    Patient appears alert , in moderate distress., and cooperative behavior.    GCS Total = 15  Airway: intact  Breathing noted as Normal  Circulation Normal  Skin:  Normal  Action taken:  Triage to critical care immediately      PRE HOSPITAL PRIOR LIVING SITUATION home

## 2021-09-28 NOTE — ED TRIAGE NOTES
"Pt here with son, pt was in ER yesterday and got a paracentesis and diagnosed with bacterial peritonitis and has a hx of liver cancer, pt received antibiotics in the ER and was discharged home and told to come back today for repeat tests and antibiotics, VSS, pt reports feeling much better today with decreased pain and much more mentally clear per son, pt brought back into Er to be evaluated  ED Nursing Triage Note (General)   ________________________________    Phi AUNDREA Tabares is a 70 year old Male that presents to triage private car  With history of  Repeat ER visit reported by patient   Significant symptoms had onset unsure   BP (!) 147/89   Pulse 74   Temp 97.9  F (36.6  C) (Tympanic)   Resp 16   Ht 1.676 m (5' 6\")   Wt 84.4 kg (186 lb)   SpO2 98%   BMI 30.02 kg/m  t  Patient appears alert  and oriented, in no acute distress., and cooperative and pleasant behavior.    GCS Total = 15  Airway: intact  Breathing noted as Normal  Circulation Normal  Skin:  Normal  Action taken:  Triage to critical care immediately      PRE HOSPITAL PRIOR LIVING SITUATION Spouse    "

## 2021-09-28 NOTE — ED PROVIDER NOTES
History     Chief Complaint   Patient presents with     Abdominal Pain       Phi Tabares is a 70 year old male who presents with need for IV antibiotics.  Patient seen in the ED yesterday.  Please see yesterday's provider note.  He was diagnosed with ascites and SBP and given IV antibiotics.  Due to divert status, he is being managed as outpatient.  After going home yesterday and overnight patient did very well.  States he was able to sleep well which has not happened for some time.  Denies any fevers, chills, nausea, vomiting, abdominal pain.  He continues to have bilateral lower extremity edema.      Allergies   Allergen Reactions     Dye [Contrast Dye]      IV Dye, Iodine Containing Contrast Media     Iodine Rash     Shrimp Rash       Patient Active Problem List    Diagnosis Date Noted     DNR (do not resuscitate) discussion 09/08/2021     Priority: Medium     DNR/DNI  POLST completed today       Calculus of bile duct without cholecystitis and without obstruction 07/20/2020     Priority: Medium     Cirrhosis of liver (H) 07/13/2020     Priority: Medium     SBO (small bowel obstruction) (H) 07/11/2020     Priority: Medium     Postoperative state 07/11/2020     Priority: Medium     S/P partial resection of colon 08/22/2019     Priority: Medium     Personal history of immunosuppressive therapy 08/22/2019     Priority: Medium     Exposure to asbestos 08/22/2019     Priority: Medium     Personal history of tobacco use, presenting hazards to health 08/22/2019     Priority: Medium     Iron deficiency anemia, unspecified iron deficiency anemia type 08/22/2019     Priority: Medium     GERD with esophagitis 08/18/2017     Priority: Medium     Crohn's disease with complication (H) 04/26/2017     Priority: Medium     Osteoarthrosis 04/26/2017     Priority: Medium     Pernicious anemia 04/26/2017     Priority: Medium     ACP (advance care planning) 08/01/2016     Priority: Medium     Advance Care Planning 8/1/2016: ACP  Review of Chart / Resources Provided:  Reviewed chart for advance care plan.  Phi Tabares Jr has no plan or code status on file however states presence of ACP document. Copy requested. Confirmed code status reflects current choices pending receipt of document/advance care plan review.  Confirmed/documented legally designated decision makers.  Added by Debby Lyn             Hyperlipidemia 06/15/2015     Priority: Medium     Diagnosis updated by automated process. Provider to review and confirm.       ED (erectile dysfunction) 2014     Priority: Medium     Disorder of bursae and tendons in shoulder region 2011     Priority: Medium     Problem list name updated by automated process. Provider to review       Greer's esophagus determined by endoscopy 2011     Priority: Medium     Overview:   Per Dr Scott (GI at [facility])       Osteoporosis 2011     Priority: Medium     Problem list name updated by automated process. Provider to review       GERD 2011     Priority: Medium       Past Medical History:   Diagnosis Date     Alcoholic cirrhosis of liver with ascites (H) 2020       Past Surgical History:   Procedure Laterality Date     ------------OTHER-------------  1980s    colon resection     CATARACT IOL, RT/LT      bilateral removal and lens placed     COLONOSCOPY  2017,[facility]     LAPAROSCOPIC CHOLECYSTECTOMY N/A 2020    Procedure: CHOLECYSTECTOMY, LAPAROSCOPIC;  Surgeon: Ludwig Bella MD;  Location:  OR       Family History   Problem Relation Age of Onset     Breast Cancer Mother      Diabetes Father      Thyroid Disease Sister      Diabetes Brother      Thyroid Disease Sister      Gastrointestinal Disease Sister         chrons       Social History     Tobacco Use     Smoking status: Former Smoker     Packs/day: 1.00     Years: 25.00     Pack years: 25.00     Quit date: 2008     Years since quittin.7     Smokeless tobacco: Never  "Used   Vaping Use     Vaping Use: Never used   Substance Use Topics     Alcohol use: Yes     Alcohol/week: 1.0 standard drinks     Comment: occasionally uses alcohol socially     Drug use: No       Medications:    ciprofloxacin (CIPRO) 500 MG tablet  ALPRAZolam (XANAX) 0.25 MG tablet  atorvastatin (LIPITOR) 10 MG tablet  BD DISP NEEDLES 22G X 1-1/2\" MISC  benzonatate (TESSALON) 100 MG capsule  calcium carbonate-vitamin D (OS-JULIUS) 500-400 MG-UNIT tablet  cyanocobalamin (CYANOCOBALAMIN) 1000 MCG/ML injection  furosemide (LASIX) 40 MG tablet  guaiFENesin-codeine (ROBITUSSIN AC) 100-10 MG/5ML solution  ibuprofen (ADVIL/MOTRIN) 600 MG tablet  oxyCODONE-acetaminophen (PERCOCET) 5-325 MG tablet  pantoprazole (PROTONIX) 40 MG EC tablet  predniSONE (DELTASONE) 20 MG tablet  Probiotic Product (PROBIOTIC ACIDOPHILUS) CAPS  senna-docusate (SENOKOT-S/PERICOLACE) 8.6-50 MG tablet  spironolactone (ALDACTONE) 50 MG tablet        Review of Systems: See HPI for pertinent negatives and positives. All other systems reviewed and found to be negative.    Physical Exam   BP (!) 147/89   Pulse 74   Temp 97.9  F (36.6  C) (Tympanic)   Resp 16   Ht 1.676 m (5' 6\")   Wt 84.4 kg (186 lb)   SpO2 98%   BMI 30.02 kg/m       General: awake, comfortable  HEENT: atraumatic, mild scleral icterus  Respiratory: normal effort, clear to auscultation bilaterally  Cardiovascular: regular rate and rhythm, no murmurs  Abdomen: soft, distended, nontender  Extremities: no deformities, 2+ BLE edema, no tenderness  Skin: warm, dry, no rashes  Neuro: alert, no focal deficits  Psych: appropriate mood and affect    ED Course           Results for orders placed or performed during the hospital encounter of 09/27/21 (from the past 24 hour(s))   CBC with platelets differential    Narrative    The following orders were created for panel order CBC with platelets differential.  Procedure                               Abnormality         Status                   "   ---------                               -----------         ------                     CBC with platelets and d...[[id number]]  Abnormal            Final result                 Please view results for these tests on the individual orders.   INR   Result Value Ref Range    INR 1.57 (H) 0.85 - 1.15   Comprehensive metabolic panel   Result Value Ref Range    Sodium 134 134 - 144 mmol/L    Potassium 3.5 3.5 - 5.1 mmol/L    Chloride 103 98 - 107 mmol/L    Carbon Dioxide (CO2) 21 21 - 31 mmol/L    Anion Gap 10 3 - 14 mmol/L    Urea Nitrogen 19 7 - 25 mg/dL    Creatinine 1.00 0.70 - 1.30 mg/dL    Calcium 8.7 8.6 - 10.3 mg/dL    Glucose 175 (H) 70 - 105 mg/dL    Alkaline Phosphatase 205 (H) 34 - 104 U/L    AST 53 (H) 13 - 39 U/L    ALT 53 (H) 7 - 52 U/L    Protein Total 5.9 (L) 6.4 - 8.9 g/dL    Albumin 2.9 (L) 3.5 - 5.7 g/dL    Bilirubin Total 3.9 (H) 0.3 - 1.0 mg/dL    GFR Estimate 77 >60 mL/min/1.73m2   Magnesium   Result Value Ref Range    Magnesium 1.7 (L) 1.9 - 2.7 mg/dL   CBC with platelets and differential   Result Value Ref Range    WBC Count 16.4 (H) 4.0 - 11.0 10e3/uL    RBC Count 3.90 (L) 4.40 - 5.90 10e6/uL    Hemoglobin 11.6 (L) 13.3 - 17.7 g/dL    Hematocrit 35.0 (L) 40.0 - 53.0 %    MCV 90 78 - 100 fL    MCH 29.7 26.5 - 33.0 pg    MCHC 33.1 31.5 - 36.5 g/dL    RDW 17.7 (H) 10.0 - 15.0 %    Platelet Count 77 (L) 150 - 450 10e3/uL    % Neutrophils 93 %    % Lymphocytes 2 %    % Monocytes 4 %    % Eosinophils 0 %    % Basophils 0 %    % Immature Granulocytes 1 %    NRBCs per 100 WBC 0 <1 /100    Absolute Neutrophils 15.2 (H) 1.6 - 8.3 10e3/uL    Absolute Lymphocytes 0.3 (L) 0.8 - 5.3 10e3/uL    Absolute Monocytes 0.7 0.0 - 1.3 10e3/uL    Absolute Eosinophils 0.0 0.0 - 0.7 10e3/uL    Absolute Basophils 0.0 0.0 - 0.2 10e3/uL    Absolute Immature Granulocytes 0.1 (H) <=0.0 10e3/uL    Absolute NRBCs 0.0 10e3/uL   Lactic acid whole blood   Result Value Ref Range    Lactic Acid 3.8 (H) 0.7 - 2.0 mmol/L   Lactate  Dehydrogenase   Result Value Ref Range    Lactate Dehydrogenase 223 140 - 271 U/L   Cell count with differential fluid    Narrative    The following orders were created for panel order Cell count with differential fluid.  Procedure                               Abnormality         Status                     ---------                               -----------         ------                     Cell Count Body Fluid[[id number]]        Abnormal            Final result               Differential Body Fluid[[id number]]                          Final result                 Please view results for these tests on the individual orders.   Gram stain    Specimen: Abdomen; Ascites Fluid   Result Value Ref Range    Gram Stain Result 4+ PMNs/low power field     Gram Stain Result No organisms seen    Cell Count Body Fluid   Result Value Ref Range    Color Yellow (A) Colorless    Clarity Hazy (A) Clear, Bloody    Total Nucleated Cells 4,275 /uL    RBC Count <2,000 /uL    Narrative    No reference ranges have been established.  This result  should be interpreted in the context of the patient's clinical condition and   compared to simultaneous measurement in the patient's blood.         Differential Body Fluid   Result Value Ref Range    % Polynuclear Cells, Body Fluid 89 %    % Mononuclear Cells, Body Fluid 11 %    Narrative    No reference ranges have been established.  This result   should be interpreted in the context of the patient's clinical condition and   compared to simultaneous measurement in the patient's blood.   US Paracentesis    Narrative    US PARACENTESIS    HISTORY: Oncologic ascites    The risks and benefits of ultrasound-guided paracentesis were  discussed with the patient. The patient expressed understanding and  willingness to proceed.     ultrasound was performed. The skin overlying the deepest pocket  of fluid was marked, prepped and draped in standard sterile fashion.  The skin and subcutaneous tissues  down to the peritoneal surface were  anesthetized with 1% lidocaine. Under ultrasound guidance, a Yueh  needle was advanced into the peritoneal space and where there was  spontaneous return of fluid. Tubing was attached to the catheter and a  vacuum suction bottle. A total of 2900 cc of fluid was withdrawn. The  patient tolerated the procedure well with no immediate complications.      Impression    IMPRESSION:    Successful ultrasound-guided paracentesis.    MARY VAUGHAN MD         SYSTEM ID:  [id number]   Lactic acid whole blood   Result Value Ref Range    Lactic Acid 2.5 (H) 0.7 - 2.0 mmol/L   Asymptomatic COVID-19 Virus (Coronavirus) by PCR Nasopharyngeal    Specimen: Nasopharyngeal; Swab   Result Value Ref Range    SARS CoV2 PCR Negative Negative    Narrative    Testing was performed using the Xpert Xpress SARS-CoV-2 Assay on the   Cepheid Gene-Xpert Instrument Systems. Additional information about   this Emergency Use Authorization (EUA) assay can be found via the Lab   Guide. This test should be ordered for the detection of SARS-CoV-2 in   individuals who meet SARS-CoV-2 clinical and/or epidemiological   criteria. Test performance is unknown in asymptomatic patients. This   test is for in vitro diagnostic use under the FDA EUA for   laboratories certified under CLIA to perform high complexity testing.   This test has not been FDA cleared or approved. A negative result   does not rule out the presence of PCR inhibitors in the specimen or   target RNA in concentration below the limit of detection for the   assay. The possibility of a false negative should be considered if   the patient's recent exposure or clinical presentation suggests   COVID-19. This test was validated by [facility]. This laboratory is certified under the Clinic  al Laboratory Improvement Amendments (CLIA) as qualified to perform high complex  ity clinical laboratory testing.        Medications   vancomycin (VANCOCIN) 1,500 mg in sodium chloride 0.9 % 500 mL intermittent infusion (1,500 mg Intravenous New Bag 9/28/21 1327)   cefTRIAXone IN D5W (ROCEPHIN) intermittent infusion 2 g (0 g Intravenous Stopped 9/28/21 1326)       Assessments & Plan (with Medical Decision Making)     I have reviewed the nursing notes.    70 year old male presents for IV antibiotics for SBP.  Please see yesterday's ED provider note.  He received IV vancomycin and ceftriaxone yesterday.  He is receiving the same dose today and will be transitioned to PO ciprofloxacin course.  No concerning signs or symptoms today including reassuring vitals on room air.  He does have abdominal distention which is improved status post paracentesis and 2+ lower extremity bilateral edema which is stable.  Discussed the importance of fluid balance and maintaining stable sodium and fluid intake to minimize need for recurrent paracenteses.  All questions/concerns answered.  Patient discharged home with ED return precautions in stable condition.    I have reviewed the findings, diagnosis, plan, and need for any follow up with the patient.      New Prescriptions    CIPROFLOXACIN (CIPRO) 500 MG TABLET    Take 1 tablet (500 mg) by mouth 2 times daily for 5 days       Final diagnoses:   SBP (spontaneous bacterial peritonitis) (H)       9/28/2021   [facility]     Austyn Hernandez MD  09/28/21 2387

## 2021-09-28 NOTE — DISCHARGE INSTRUCTIONS
Please review attached instructions including reasons to return to the emergency department.  Please also complete 5-day course of ciprofloxacin antibiotic taken twice daily.  Work closely with your PCP Dr. Adamson to manage your ascites.

## 2021-09-28 NOTE — PHARMACY-VANCOMYCIN DOSING SERVICE
Pharmacy Vancomycin Initial Note  Date of Service 2021  Patient's  1950  70 year old, male    Indication: Intra-abdominal infection    Current estimated CrCl = Estimated Creatinine Clearance: 72 mL/min (based on SCr of 1 mg/dL).    Creatinine for last 3 days  2021:  3:57 PM Creatinine 1.00 mg/dL    Recent Vancomycin Level(s) for last 3 days  No results found for requested labs within last 72 hours.      Vancomycin IV Administrations (past 72 hours)                   vancomycin (VANCOCIN) 2,000 mg in sodium chloride 0.9 % 500 mL intermittent infusion (mg) 2,000 mg New Bag 21 1834                Nephrotoxins and other renal medications (From now, onward)    Start     Dose/Rate Route Frequency Ordered Stop    21 1305  vancomycin (VANCOCIN) 1,500 mg in sodium chloride 0.9 % 500 mL intermittent infusion      1,500 mg  over 90 Minutes Intravenous ONCE 21 1300            Contrast Orders - past 72 hours (72h ago, onward)    None        Loading dose: 2000 mg at 19:00 2021.  Regimen: 1500 mg IV every 24 hours.  Start time: 12:58 on 2021  Exposure target: AUC24 (range)400-600 mg/L.hr   AUC24,ss: 440 mg/L.hr  Probability of AUC24 > 400: 61 %  Ctrough,ss: 12 mg/L  Probability of Ctrough,ss > 20: 14 %  Probability of nephrotoxicity (Lodise PHILIP ): 7 %          Plan:  1. Give Vancomycin 1,500 mg x1 dose. If therapy continued or patient admitted, consider  starting vancomycin  1,500 mg IV q24h, per above. Would recommend obtaining repeat Scr and/or Vancomycin level prior to any additional doses, however.  2. Vancomycin monitoring method: AUC  3. Vancomycin therapeutic monitoring goal: 400-600 mg*h/L  4. Pharmacy will check vancomycin levels as appropriate in 1-3 Days.    5. Serum creatinine levels will be ordered daily for the first week of therapy and at least twice weekly for subsequent weeks.      Sean Mracial, [facility]

## 2021-10-03 NOTE — RESULT ENCOUNTER NOTE
Final Ascites fluid Aerobic Bacterial Culture report is NEGATIVE.    No treatment or change in treatment per [facility] Lab Result culture protocol.

## 2021-10-07 NOTE — TELEPHONE ENCOUNTER
Patient EC states a few weeks ago patient was in the ER had infection, was not able to be admitted due to hospital unable to accept pts. He is supposed to follow up. She states he his abdomen is bigger, difficulty breathing again, edema in legs.   She is wondering if he should go to ER or if [facility] can see him, or if needing to have orders for paracentesis.   Chitra Chakraborty LPN .............10/7/2021     2:26 PM

## 2021-10-11 NOTE — PROGRESS NOTES
Patient had an uncomplicated paracentesis.  3,100 mLs of light yellow colored fluid removed from abdomen.  Pressure applied to needle insertion site.  Skin adhesive was used.  Covered with sterile 2x2 and tegaderm. Fluid was sent to lab for analysis.    Patient brought back to ER and report was given to Provider.     Sandra Gibson RN on 10/11/2021 at 2:02 PM

## 2021-10-11 NOTE — TELEPHONE ENCOUNTER
Pt went to J[facility] [place] CMA ([facility])......................10/11/2021  2:59 PM

## 2021-10-11 NOTE — ED PROVIDER NOTES
"  History     Chief Complaint   Patient presents with     ascites     HPI  Phi Tabares is a 70 year old male with a history of cholangiocarcinoma who presents with abdominal pain and dyspnea.  Over the past 2 weeks patient has been having worsening ascites and bilateral lower extremity edema.   He was seen on 9/27 and 9/28/2021 in the ER and diagnosed with spontaneous bacterial peritonitis.  He was given vancomycin as well as Rocephin on an outpatient basis.  This was transitioned to Cipro orally.  He had been previously following at [place] with gastroenterology/hepatology for his cancer.  Reports previously being on liver transplant list which it sounds like he is no longer on as of recently per patient.    He reports that he currently takes Lasix 80 mg daily as well as spironolactone.  He called his gastroenterologist at [facility] and was informed to come to the ER for a \"tap\".    Allergies:  Allergies   Allergen Reactions     Dye [Contrast Dye]      IV Dye, Iodine Containing Contrast Media     Iodine Rash     Shrimp Rash       Problem List:    Patient Active Problem List    Diagnosis Date Noted     DNR (do not resuscitate) discussion 09/08/2021     Priority: Medium     DNR/DNI  POLST completed today       Calculus of bile duct without cholecystitis and without obstruction 07/20/2020     Priority: Medium     Cirrhosis of liver (H) 07/13/2020     Priority: Medium     SBO (small bowel obstruction) (H) 07/11/2020     Priority: Medium     Postoperative state 07/11/2020     Priority: Medium     S/P partial resection of colon 08/22/2019     Priority: Medium     Personal history of immunosuppressive therapy 08/22/2019     Priority: Medium     Exposure to asbestos 08/22/2019     Priority: Medium     Personal history of tobacco use, presenting hazards to health 08/22/2019     Priority: Medium     Iron deficiency anemia, unspecified iron deficiency anemia type 08/22/2019     Priority: Medium     GERD with esophagitis " 08/18/2017     Priority: Medium     Crohn's disease with complication (H) 04/26/2017     Priority: Medium     Osteoarthrosis 04/26/2017     Priority: Medium     Pernicious anemia 04/26/2017     Priority: Medium     ACP (advance care planning) 08/01/2016     Priority: Medium     Advance Care Planning 8/1/2016: ACP Review of Chart / Resources Provided:  Reviewed chart for advance care plan.  Phi Tabares Jr has no plan or code status on file however states presence of ACP document. Copy requested. Confirmed code status reflects current choices pending receipt of document/advance care plan review.  Confirmed/documented legally designated decision makers.  Added by Debby Lyn             Hyperlipidemia 06/15/2015     Priority: Medium     Diagnosis updated by automated process. Provider to review and confirm.       ED (erectile dysfunction) 02/21/2014     Priority: Medium     Disorder of bursae and tendons in shoulder region 08/30/2011     Priority: Medium     Problem list name updated by automated process. Provider to review       Greer's esophagus determined by endoscopy 01/01/2011     Priority: Medium     Overview:   Per Dr Scott (GI at [facility])       Osteoporosis 01/01/2011     Priority: Medium     Problem list name updated by automated process. Provider to review       GERD 01/01/2011     Priority: Medium        Past Medical History:    Past Medical History:   Diagnosis Date     Alcoholic cirrhosis of liver with ascites (H) 7/11/2020       Past Surgical History:    Past Surgical History:   Procedure Laterality Date     ------------OTHER-------------  1980s    colon resection     CATARACT IOL, RT/LT  2017    bilateral removal and lens placed     COLONOSCOPY  09/28/2017 09/28/2017,[facility]     LAPAROSCOPIC CHOLECYSTECTOMY N/A 7/6/2020    Procedure: CHOLECYSTECTOMY, LAPAROSCOPIC;  Surgeon: Ludwig Bella MD;  Location:  OR       Family History:    Family History   Problem Relation Age of Onset      "Breast Cancer Mother      Diabetes Father      Thyroid Disease Sister      Diabetes Brother      Thyroid Disease Sister      Gastrointestinal Disease Sister         chrons       Social History:  Marital Status:   [2]  Social History     Tobacco Use     Smoking status: Former Smoker     Packs/day: 1.00     Years: 25.00     Pack years: 25.00     Quit date: 2008     Years since quittin.7     Smokeless tobacco: Never Used   Vaping Use     Vaping Use: Never used   Substance Use Topics     Alcohol use: Yes     Alcohol/week: 1.0 standard drinks     Comment: occasionally uses alcohol socially     Drug use: No        Medications:    ALPRAZolam (XANAX) 0.25 MG tablet  atorvastatin (LIPITOR) 10 MG tablet  BD DISP NEEDLES 22G X 1-2\" MISC  benzonatate (TESSALON) 100 MG capsule  calcium carbonate-vitamin D (OS-JULIUS) 500-400 MG-UNIT tablet  cyanocobalamin (CYANOCOBALAMIN) 1000 MCG/ML injection  furosemide (LASIX) 40 MG tablet  guaiFENesin-codeine (ROBITUSSIN AC) 100-10 MG/5ML solution  ibuprofen (ADVIL/MOTRIN) 600 MG tablet  oxyCODONE-acetaminophen (PERCOCET) 5-325 MG tablet  pantoprazole (PROTONIX) 40 MG EC tablet  predniSONE (DELTASONE) 20 MG tablet  Probiotic Product (PROBIOTIC ACIDOPHILUS) CAPS  senna-docusate (SENOKOT-S/PERICOLACE) 8.6-50 MG tablet  spironolactone (ALDACTONE) 50 MG tablet          Review of Systems   Constitutional: Negative for fever.   HENT: Negative for drooling and facial swelling.    Eyes: Negative for pain and visual disturbance.   Respiratory: Negative for stridor.    Cardiovascular: Positive for leg swelling. Negative for chest pain.   Gastrointestinal: Positive for abdominal pain.        Abdominal bloatedness   Genitourinary: Negative for flank pain.   Musculoskeletal: Negative for back pain.   Skin: Negative for pallor.   Neurological: Negative for tremors, seizures and facial asymmetry.   Psychiatric/Behavioral: Negative for agitation and confusion.   All other systems reviewed and " "are negative.      Physical Exam   BP: 120/83  Temp: 97.3  F (36.3  C)  Resp: 19  Height: 170.2 cm (5' 7\")  Weight: 92.1 kg (203 lb)  SpO2: 100 %      Physical Exam  Vitals and nursing note reviewed.   Constitutional:       General: He is not in acute distress.     Appearance: Normal appearance. He is not ill-appearing or toxic-appearing.   HENT:      Head: Normocephalic. No raccoon eyes, right periorbital erythema or left periorbital erythema.      Right Ear: No drainage or tenderness.      Left Ear: No drainage or tenderness.      Nose: Nose normal.   Eyes:      General: Lids are normal. Gaze aligned appropriately. No scleral icterus.     Extraocular Movements: Extraocular movements intact.   Neck:      Trachea: No tracheal deviation.   Cardiovascular:      Rate and Rhythm: Normal rate.   Pulmonary:      Effort: Pulmonary effort is normal. No respiratory distress.      Breath sounds: No stridor. No wheezing.   Abdominal:      Tenderness: There is abdominal tenderness.   Musculoskeletal:         General: No deformity or signs of injury. Normal range of motion.      Cervical back: Normal range of motion. No signs of trauma.      Right lower leg: Edema present.      Left lower leg: Edema present.   Skin:     General: Skin is warm and dry.      Coloration: Skin is not jaundiced or pale.   Neurological:      General: No focal deficit present.      Mental Status: He is alert and oriented to person, place, and time.      GCS: GCS eye subscore is 4. GCS verbal subscore is 5. GCS motor subscore is 6.      Motor: No tremor or seizure activity.   Psychiatric:         Attention and Perception: Attention normal.         Mood and Affect: Mood normal.         ED Course     Results for orders placed or performed during the hospital encounter of 10/11/21 (from the past 24 hour(s))   CBC with platelets differential    Narrative    The following orders were created for panel order CBC with platelets differential.  Procedure          "                      Abnormality         Status                     ---------                               -----------         ------                     CBC with platelets and d...[[id number]]  Abnormal            Final result                 Please view results for these tests on the individual orders.   INR   Result Value Ref Range    INR 1.44 (H) 0.85 - 1.15   Comprehensive metabolic panel   Result Value Ref Range    Sodium 136 134 - 144 mmol/L    Potassium 4.2 3.5 - 5.1 mmol/L    Chloride 106 98 - 107 mmol/L    Carbon Dioxide (CO2) 23 21 - 31 mmol/L    Anion Gap 7 3 - 14 mmol/L    Urea Nitrogen 18 7 - 25 mg/dL    Creatinine 0.83 0.70 - 1.30 mg/dL    Calcium 8.5 (L) 8.6 - 10.3 mg/dL    Glucose 102 70 - 105 mg/dL    Alkaline Phosphatase 160 (H) 34 - 104 U/L    AST 70 (H) 13 - 39 U/L    ALT 60 (H) 7 - 52 U/L    Protein Total 5.5 (L) 6.4 - 8.9 g/dL    Albumin 2.8 (L) 3.5 - 5.7 g/dL    Bilirubin Total 2.3 (H) 0.3 - 1.0 mg/dL    GFR Estimate 90 >60 mL/min/1.73m2   Troponin I   Result Value Ref Range    Troponin I 6.4 0.0 - 34.0 pg/mL   Magnesium   Result Value Ref Range    Magnesium 2.0 1.9 - 2.7 mg/dL   CRP inflammation   Result Value Ref Range    CRP Inflammation 9.6 <10.0 mg/L   CBC with platelets and differential   Result Value Ref Range    WBC Count 6.2 4.0 - 11.0 10e3/uL    RBC Count 3.71 (L) 4.40 - 5.90 10e6/uL    Hemoglobin 10.6 (L) 13.3 - 17.7 g/dL    Hematocrit 33.5 (L) 40.0 - 53.0 %    MCV 90 78 - 100 fL    MCH 28.6 26.5 - 33.0 pg    MCHC 31.6 31.5 - 36.5 g/dL    RDW 16.9 (H) 10.0 - 15.0 %    Platelet Count 65 (L) 150 - 450 10e3/uL    % Neutrophils 90 %    % Lymphocytes 3 %    % Monocytes 7 %    % Eosinophils 0 %    % Basophils 0 %    % Immature Granulocytes 0 %    NRBCs per 100 WBC 0 <1 /100    Absolute Neutrophils 5.6 1.6 - 8.3 10e3/uL    Absolute Lymphocytes 0.2 (L) 0.8 - 5.3 10e3/uL    Absolute Monocytes 0.4 0.0 - 1.3 10e3/uL    Absolute Eosinophils 0.0 0.0 - 0.7 10e3/uL    Absolute Basophils 0.0 0.0 -  0.2 10e3/uL    Absolute Immature Granulocytes 0.0 <=0.0 10e3/uL    Absolute NRBCs 0.0 10e3/uL   Cell count with differential fluid    Narrative    The following orders were created for panel order Cell count with differential fluid.  Procedure                               Abnormality         Status                     ---------                               -----------         ------                     Cell Count Body Fluid[[id number]]        Abnormal            Edited Result - FINAL      Differential Body Fluid[[id number]]                          Final result                 Please view results for these tests on the individual orders.   Gram stain    Specimen: Peritoneum; Ascites Fluid   Result Value Ref Range    Gram Stain Result No organisms seen     Gram Stain Result PMNs Seen    Cell Count Body Fluid   Result Value Ref Range    Color Yellow Colorless, Yellow    Clarity Hazy (A) Clear, Bloody    Total Nucleated Cells 149 /uL    RBC Count 0 /uL    Narrative    No reference ranges have been established.  This result  should be interpreted in the context of the patient's clinical condition and   compared to simultaneous measurement in the patient's blood.         Differential Body Fluid   Result Value Ref Range    % Polynuclear Cells, Body Fluid 18 %    % Mononuclear Cells, Body Fluid 82 %    Narrative    No reference ranges have been established.  This result   should be interpreted in the context of the patient's clinical condition and   compared to simultaneous measurement in the patient's blood.   US Paracentesis    Narrative    US PARACENTESIS    HISTORY: , ascites    The risks and benefits of ultrasound-guided paracentesis were  discussed with the patient. The patient expressed understanding and  willingness to proceed.     ultrasound was performed. The skin overlying the deepest pocket  of fluid was marked, prepped and draped in standard sterile fashion.  The skin and subcutaneous tissues down to the  peritoneal surface were  anesthetized with 1% lidocaine. Under ultrasound guidance, a Yueh  needle was advanced into the peritoneal space and where there was  spontaneous return of fluid. Tubing was attached to the catheter and a  vacuum suction bottle. A total of 3100 cc of fluid was withdrawn. The  patient tolerated the procedure well with no immediate complications.      Impression    IMPRESSION:    Successful ultrasound-guided paracentesis.    MARY VAUGHAN MD         SYSTEM ID:  [id number]       Medications   sodium chloride 0.9% infusion (has no administration in time range)   lidocaine 1 % 10 mL (10 mLs Infiltration Given by Other 10/11/21 1340)       Assessments & Plan (with Medical Decision Making)     I have reviewed the nursing notes.    I have reviewed the findings, diagnosis, plan and need for follow up with the patient.      New Prescriptions    No medications on file       Final diagnoses:   Other ascites   Cirrhosis of liver with ascites, unspecified hepatic cirrhosis type (H)     Afebrile.  Vital signs stable.  Patient with known cirrhosis and ascites.  He was referred to the ER for further evaluation by his gastroenterologist at [facility].  IV established.  He was given 80 mg of Lasix IV.  He is currently on 80 mg of Lasix daily.  Ultrasound paracentesis was ordered.  CBC shows normal white blood cells with no left shift.  His hemoglobin is 10.6.  These are much improved from his previous numbers.  CMP shows alk phos at 160, AST is 70, and ALT is 60 with his albumin at 2.8 these are slightly improved from his previous numbers.  Troponin is normal.  Magnesium normal.  INR is 1.44.  CRP is normal.  He underwent an ultrasound paracentesis and 3100 mL were withdrawn.  The fluid appears hazy with 149 white blood cells and 0 red blood cells.  This is a much improved from previous numbers.  Gram stain shows no organisms some PMNs seen.  Her hospital is currently on divert at this time as well as  most of the stage.  I reviewed these numbers with Dr. Barber and the patient will be discharged home at this time.  Follow-up if there is any concerns for further evaluation as needed otherwise he will need to be in contact with his gastroenterologist tomorrow to review the lab findings.  Return if there is any concerns for further evaluation as needed.  10/11/2021   [facility] AND \[facility] [place] [facility]\""     Caleb Collazo PA-C  10/11/21 [id number]

## 2021-10-19 NOTE — NURSING NOTE
"Chief Complaint   Patient presents with     Discuss weekly paracentisis     Refill Request     Atorvastatin and Oxycodone/ acetaminophen        Initial /82 (BP Location: Right arm, Patient Position: Sitting, Cuff Size: Adult Regular)   Pulse 105   Temp 97.8  F (36.6  C) (Tympanic)   Resp 20   Wt 92.9 kg (204 lb 11.2 oz)   SpO2 97%   BMI 32.06 kg/m   Estimated body mass index is 32.06 kg/m  as calculated from the following:    Height as of 10/11/21: 1.702 m (5' 7\").    Weight as of this encounter: 92.9 kg (204 lb 11.2 oz).  Medication Reconciliation: complete  Lennie Newell RN  FOOD SECURITY SCREENING QUESTIONS  Hunger Vital Signs:  Within the past 12 months we worried whether our food would run out before we got money to buy more. Never  Within the past 12 months the food we bought just didn't last and we didn't have money to get more. Never  Lennie Newell RN 10/19/2021 11:07 AM  "

## 2021-10-19 NOTE — PATIENT INSTRUCTIONS
-- Low salt diet, under 2000 mg/day   -- Fluid restrict, under 2000 mL/day aka 8.5 cups/day   -- Eat healthy protein   -- Learn about DASH Diet for dietary ways to reduce blood pressure  1. Google: [facility] DASH diet  2. [facility] site: https://www.nhlbi.nih.gov/health-topics/dash-eating-plan  3. PDF from [facility]: https://www.nhlbi.nih.gov/files/docs/public/heart/new_dash.pdf   -- Elevate feet above your hips for 20-30 minutes, 4 times per day   -- Compression stockings from morning to night   -- Work on 5-10% weight loss     -- Schedule paracentesis every 2 weeks

## 2021-10-19 NOTE — PROGRESS NOTES
Subjective   Phi Tabares is a 70 year old male who presents for discuss weekly paracentesis.  He thinks he may need paracentesis every 1 to 2 weeks.  He has been having to get those that frequently lately.  This is despite having an increase in his water medication.    Objective   Vitals: /82 (BP Location: Right arm, Patient Position: Sitting, Cuff Size: Adult Regular)   Pulse 105   Temp 97.8  F (36.6  C) (Tympanic)   Resp 20   Wt 92.9 kg (204 lb 11.2 oz)   SpO2 97%   BMI 32.06 kg/m        Review and Analysis of Data   I personally reviewed the following:  External notes: Yes Reviewed the [place] gastroenterology note  Results: Yes I reviewed his most recent lab work including paracenteses, kidney and liver panels  Use of an independent historian: No  Independent review of a test performed by another physician: No  Discussion of management with another physician: No  Moderate risk of morbidity from additional diagnostic testing and/or treatment.    Assessment & Plan   1. Alcoholic cirrhosis of liver with ascites (H)  2. Cirrhosis of liver with ascites, unspecified hepatic cirrhosis type (H)  3. Malignant neoplasm of liver, primary (H)  4. Cholangiocarcinoma (H)  We reviewed his medical record from the last several encounters.  I reviewed the note from the gastroenterologist.  It appears that his ascites is due to his cirrhosis and not directly from the malignancy.  We had a lengthy discussion today with regards to dietary changes he should work on including low-sodium diet, fluid restriction, healthy protein intake.  It sounds as though he has been drinking a lot of water.  His loop diuretic dose has been increased significantly.  He also continues on spironolactone.  He may require frequent paracenteses.  We discussed how at some point in the future he may have a desire to focus on comfort and at that time an indwelling peritoneal catheter with hospice could be considered.  - oxyCODONE-acetaminophen  (PERCOCET) 5-325 MG tablet; Take 1-2 tablets by mouth every 6 hours as needed for severe pain (moderate to severe)  Dispense: 90 tablet; Refill: 0  - US Paracentesis; Standing    5. Pain  He has cancer related pain.  Narcotic contract is not indicated at this time.  - oxyCODONE-acetaminophen (PERCOCET) 5-325 MG tablet; Take 1-2 tablets by mouth every 6 hours as needed for severe pain (moderate to severe)  Dispense: 90 tablet; Refill: 0    6. [place] cardiac risk 10-20% in next 10 years  At goal, no change.  - atorvastatin (LIPITOR) 10 MG tablet; Take 1 tablet (10 mg) by mouth daily  Dispense: 90 tablet; Refill: 3    7. Insomnia, unspecified type  - oxyCODONE-acetaminophen (PERCOCET) 5-325 MG tablet; Take 1-2 tablets by mouth every 6 hours as needed for severe pain (moderate to severe)  Dispense: 90 tablet; Refill: 0    8. Calculus of gallbladder without cholecystitis without obstruction  - oxyCODONE-acetaminophen (PERCOCET) 5-325 MG tablet; Take 1-2 tablets by mouth every 6 hours as needed for severe pain (moderate to severe)  Dispense: 90 tablet; Refill: 0  - senna-docusate (SENOKOT-S/PERICOLACE) 8.6-50 MG tablet; Take 1-2 tablets by mouth 2 times daily Take while on oral narcotics to prevent or treat constipation.  Dispense: 30 tablet; Refill: 0    9. Gastroesophageal reflux disease without esophagitis  - pantoprazole (PROTONIX) 40 MG EC tablet; TAKE 1 TABLET(40 MG) BY MOUTH TWICE DAILY  Dispense: 180 tablet; Refill: 3      Patient Instructions    -- Low salt diet, under 2000 mg/day   -- Fluid restrict, under 2000 mL/day aka 8.5 cups/day   -- Eat healthy protein   -- Learn about DASH Diet for dietary ways to reduce blood pressure  1. Google: NIH DASH diet  2. NIH site: https://www.nhlbi.nih.gov/health-topics/dash-eating-plan  3. PDF from [facility]: https://www.nhlbi.nih.gov/files/docs/public/heart/new_dash.pdf   -- Elevate feet above your hips for 20-30 minutes, 4 times per day   -- Compression stockings from morning  to night   -- Work on 5-10% weight loss     -- Schedule paracentesis every 2 weeks         Return if symptoms worsen or fail to improve.    Signed, Perez Adamson MD, FAAP, FACP  Internal Medicine & Pediatrics

## 2021-10-27 NOTE — DISCHARGE INSTRUCTIONS
ULTRASOUND GUIDED PARACENTESIS    Ultrasound guided paracentesis is a procedure which involves the insertion of a needle through the wall of the abdomen to remove fluid. This is done to find out the cause of fluid in the abdomen and/or to relieve the symptoms caused by it. Because this requires the insertion of a needle into the abdomen, there is a small risk of bleeding and infection.     Activity: Rest the remainder of the day. You may resume normal activity the next day. Avoid any vigorous physical activity for 24 hours.    Comfort: If you have any discomfort or tenderness at the site you may take your usual or recommended pain medication. Do not take aspirin the day of the procedure.    Diet: You may resume your usual diet.    Care of site: Keep the bandage on for 24 hours. Then you may remove the bandage and shower. You may put on a clean band-aid or leave open to the air.    RETURN TO THE EMERGENCY ROOM FOR:    Severe abdominal pain or fullness   Light-headedness or dizziness (especially when upright or standing)   Chest pain/tightness or shortness of breath    CALL YOUR DOCTOR FOR:    A fever over 101 degrees   Increased redness, increased swelling, and/or persistent drainage/discomfort around the site    For questions, problems or concerns, contact the Radiology Department at [phone].     It was a privilege caring for you today. I aim to give you the highest quality care, spending time to review your chart to be familiar with your particular health issues. Thank you for allowing me to serve you today. If you have any questions or concerns, or you have ideas for how I could have improved your care today, please don't hesitate to reach me at my office number Monday - Friday from 8:00 am - 4:30 pm at [phone]. I wish you wellness. It was my pleasure to meet you.     Warmly,       Nely MORRISSEY, RN - [facility] Imaging Department

## 2021-10-27 NOTE — PROGRESS NOTES
Patient had an uncomplicated paracentesis.  4950 mLs of straw colored fluid removed from abdomen.  Pressure applied to needle insertion site.  Skin adhesive was used.  Covered with sterile 2x2 and tegaderm.  Patient will not receive albumin today (no orders for albumin).

## 2021-11-03 NOTE — TELEPHONE ENCOUNTER
Will need INR and CBC every 30 days for para. Thank you.     In addition, I called to confirm the patient's appt for paracentesis scheduled for Friday 11/5. The wife answered and expressed her concern that he has been filling up with fluid quite quickly and is very uncomfortable. She is wondering if he can come twice a week for paracentesis to help with the fullness and discomfort. Your advice is appreciated. Thank you.     Do you think it may be time for the pleurx drain? A consult could possibly be set up for them? I called and spoke with the Director of Surgical Services and she claimed the patient would not need to be on hospice to have a drain placed, but I don't know the ins and outs of hospice care....      Amelia STEWART RN  Imaging Department  Office: [phone]  Cell: [phone]   M-F [phone]  Imaging Schedulers: [phone]

## 2021-11-04 NOTE — TELEPHONE ENCOUNTER
As frequent as is required.  I will also work on diuresis with him, and we will again revisit Hospice and a pleurex.    Signed, Perez Adamson MD, FAAP, FACP  Internal Medicine & Pediatrics

## 2021-11-04 NOTE — TELEPHONE ENCOUNTER
Perhaps they should schedule an OV to discuss.      ICD-10-CM    1. Cirrhosis of liver with ascites, unspecified hepatic cirrhosis type (H)  K74.60 CBC with platelets    R18.8 INR      No orders of the defined types were placed in this encounter.    Signed, Perez Adamson MD, FAAP, FACP  Internal Medicine & Pediatrics

## 2021-11-04 NOTE — TELEPHONE ENCOUNTER
Thank you, Dr. Adamson. Your suggestion was relayed to patient and wife in a voicemail. Appointment line phone number was provided should they decide they'd like to make an appointment to see you.     To clarify, are we able to see him more than every other week for comfort? He feels that he cannot wait that long between visits. Thank you for clarification.    Nely

## 2021-11-05 NOTE — PROGRESS NOTES
Patient had an uncomplicated paracentesis.  2,700 mLs of sung colored fluid removed from abdomen.  Pressure applied to needle insertion site.  Skin adhesive was used.  Covered with sterile 2x2 and tegaderm.     Sandra Gibson RN on 11/5/2021 at 10:51 AM

## 2021-11-21 DIAGNOSIS — K74.60 CIRRHOSIS OF LIVER WITH ASCITES, UNSPECIFIED HEPATIC CIRRHOSIS TYPE (H): ICD-10-CM

## 2021-11-21 DIAGNOSIS — R18.8 CIRRHOSIS OF LIVER WITH ASCITES, UNSPECIFIED HEPATIC CIRRHOSIS TYPE (H): ICD-10-CM

## 2021-11-21 RX ORDER — FUROSEMIDE 40 MG
TABLET ORAL
Qty: 90 TABLET | Refills: 0 | OUTPATIENT
Start: 2021-11-21

## 2021-11-22 ENCOUNTER — TELEPHONE (OUTPATIENT)
Dept: PEDIATRICS | Facility: OTHER | Age: 71
End: 2021-11-22
Payer: MEDICARE

## 2021-11-22 NOTE — TELEPHONE ENCOUNTER
I called and spoke with his wife.  She was frustrated about the word alcoholic on his death certificate.  I reviewed his records and it is incorrect, his cirrhosis was due to crohn's and primary biliary cirrhosis. I've contacted HIM to make the changes. She was very happy with his care and hospice care.    Signed, Perez Adamson MD, FAAP, FACP  Internal Medicine & Pediatrics

## 2021-11-22 NOTE — TELEPHONE ENCOUNTER
Pt had questions about her husbands death.  Please call    Jerrod Willett on 11/22/2021 at 11:24 AM

## 2021-11-22 NOTE — TELEPHONE ENCOUNTER
Spouse would like to speak with Dr. Adamson personally.      Jacquie Rodriguez LPN 11/22/2021 12:25 PM

## 2024-04-09 NOTE — CONSULTS
"Surgical  Consult  Referring physician:  DIANA Delgadillo  Primary physician:     Perez Adamson    Chief complaint:   Possible small bowel obstruction.    History of present illness:  This is a 69 year old male I am seeing in consultation for a possible postoperative small bowel obstruction.  The patient underwent laparoscopic cholecystectomy on 7/6/2020 with Dr. Ludwig Bella.  The night of surgery he reports he was very somnolent.  He had severe pain the next couple of days and took a lot of pain medication.  He was nauseous and had vomiting.  He is not been able to take his medications.  He has not been eating or drinking.  Flat and upright abdominal x-rays reveal some distended small bowel loops.  No free air.  Dar drain in the right upper quadrant.  The patient reports that he has had small bowel movements and is passing gas.  The operative report suggest a loop of small bowel was obstructing the view and this may be related to prior partial colectomy for Crohn's disease in the past.  Also reported was probable cirrhosis.  His total bilirubin is 2.9.  Remaining liver functions are unremarkable.     Past medical history:   No past medical history on file.    Pastsurgical history:  Past Surgical History:   Procedure Laterality Date     ------------OTHER-------------  1980s    colon resection     CATARACT IOL, RT/LT  2017    bilateral removal and lens placed     COLONOSCOPY  09/28/2017 09/28/2017,[facility]     LAPAROSCOPIC CHOLECYSTECTOMY N/A 7/6/2020    Procedure: CHOLECYSTECTOMY, LAPAROSCOPIC;  Surgeon: Ludwig Bella MD;  Location: [facility] OR       Current medications:  No current outpatient medications on file.   Medications:    acetaminophen (TYLENOL) 325 MG tablet  Ascorbic Acid (VITAMIN C) 500 MG CAPS  aspirin EC 81 MG EC tablet  atorvastatin (LIPITOR) 10 MG tablet  BD DISP NEEDLES 22G X 1-1/2\" MISC  benzonatate (TESSALON) 100 MG capsule  calcium carbonate-vitamin D (OS-JULIUS) 500-400 MG-UNIT " tablet  cyanocobalamin (CYANOCOBALAMIN) 1000 MCG/ML injection  Ferrous Sulfate (IRON) 325 (65 Fe) MG tablet  ibuprofen (ADVIL/MOTRIN) 600 MG tablet  oxyCODONE-acetaminophen (PERCOCET) 5-325 MG tablet  pantoprazole (PROTONIX) 40 MG EC tablet  Probiotic Product (PROBIOTIC ACIDOPHILUS) CAPS  senna-docusate (SENOKOT-S/PERICOLACE) 8.6-50 MG tablet  azaTHIOprine (IMURAN) 50 MG tablet    Allergies:  Allergies   Allergen Reactions     Dye [Contrast Dye]      IV Dye, Iodine Containing Contrast Media     Iodine Rash     Shrimp Rash       Family history:  Family History   Problem Relation Age of Onset     Breast Cancer Mother      Diabetes Father      Thyroid Disease Sister      Diabetes Brother      Thyroid Disease Sister      Gastrointestinal Disease Sister         chrons       Social history:  Social History     Socioeconomic History     Marital status:      Spouse name: Not on file     Number of children: Not on file     Years of education: Not on file     Highest education level: Not on file   Occupational History     Not on file   Social Needs     Financial resource strain: Not on file     Food insecurity     Worry: Not on file     Inability: Not on file     Transportation needs     Medical: Not on file     Non-medical: Not on file   Tobacco Use     Smoking status: Former Smoker     Packs/day: 1.00     Years: 25.00     Pack years: 25.00     Last attempt to quit: 2008     Years since quittin.5     Smokeless tobacco: Never Used   Substance and Sexual Activity     Alcohol use: Yes     Alcohol/week: 1.0 standard drinks     Comment: occasionally uses alcohol socially     Drug use: No     Sexual activity: Yes     Partners: Female   Lifestyle     Physical activity     Days per week: Not on file     Minutes per session: Not on file     Stress: Not on file   Relationships     Social connections     Talks on phone: Not on file     Gets together: Not on file     Attends Buddhist service: Not on file     Active  "member of club or organization: Not on file     Attends meetings of clubs or organizations: Not on file     Relationship status: Not on file     Intimate partner violence     Fear of current or ex partner: Not on file     Emotionally abused: Not on file     Physically abused: Not on file     Forced sexual activity: Not on file   Other Topics Concern     Parent/sibling w/ CABG, MI or angioplasty before 65F 55M? Not Asked   Social History Narrative    Retired from mines       PROBLEM LIST:  Patient Active Problem List   Diagnosis     Disorder of bursae and tendons in shoulder region     Greer's esophagus determined by endoscopy     Osteoporosis     GERD     ED (erectile dysfunction)     Hyperlipidemia     ACP (advance care planning)     Crohn's disease with complication (H)     GERD with esophagitis     Osteoarthrosis     Pernicious anemia     S/P partial resection of colon     Personal history of immunosuppressive therapy     Exposure to asbestos     Personal history of tobacco use, presenting hazards to health     Iron deficiency anemia, unspecified iron deficiency anemia type     SBO (small bowel obstruction) (H)     Postoperative state     Review of systems:  COMPLETE REVIEW OF SYSTEMS: Decreased appetite  Gastrointestinal: Abdominal distention pain nausea and vomiting, see HPI  Cardiovascular: Denies problems  Respiratory: Denies problems  Genitourinary: Denies problems  Musculoskeletal: Denies problems  Skin: Denies problems  Neurologic: Occasional vertigo  Psychiatric: Denies problems  Endocrine: Denies problems  Heme/Lymphatic: Imuran for Crohn's disease  Vascular: Denies problems      Physical exam: /77   Pulse 89   Temp 98.6  F (37  C) (Tympanic)   Resp 20   Ht 1.676 m (5' 6\")   Wt 81.6 kg (180 lb)   SpO2 96%   BMI 29.05 kg/m        General: this is a pleasant male patient in no acute distress.  Patient is awake alert and oriented x3 .   Respiratory: Clear to auscultation  Cardiovascular: " Regular rate and rhythm  Abdominal: Long midline scar.  Laparoscopic recent scars without erythema.  Some ecchymosis.  Right upper quadrant drain.  Fluid is clear and straw-colored.  Bowel sounds are normoactive without rushes or tinkles.  Mild distention.  No tenderness to palpation.     Assessment:   Postoperative nausea vomiting and distention after difficult laparoscopic cholecystectomy and a complicated patient with Crohn's disease and probable cirrhosis.  Clinically small bowel obstruction is lower on the list.  This may simply be side effects of narcotics.  Given the complicated nature of this patient is CT scan is appropriate for further evaluation.  Because of isolated elevated bilirubin  needs further evaluation as well.     Plan:    Fractionated bilirubin  CT scan of the abdomen pelvis with oral contrast  Will follow      Brandon Dubois MD FACS           declines

## 2025-06-18 NOTE — ED TRIAGE NOTES
ED Nursing Triage Note (General)   ________________________________    Phi Tabares is a 70 year old Male that presents to triage private car  With history of  Cancer of the bile ducts and has ascites that his Jaya MD (Fred Conteh MD) gave him furosemide and then doubled it but no relief and now was told to come to ED for a tap.  Last visit he had a tap as well 2 weeks ago reported by patient   Significant symptoms had onset 2 week(s) ago.  /83   Temp 97.3  F (36.3  C) (Tympanic)   Resp 19   Ht 1.702 m (5' 7\")   Wt 92.1 kg (203 lb)   SpO2 100%   BMI 31.79 kg/m  t  Patient appears alert , in moderate distress., and cooperative behavior.  GCS Total = 15  Airway: intact  Breathing noted as Normal  Circulation Normal  Skin:  Normal  Action taken:  To waiting room      PRE HOSPITAL PRIOR LIVING SITUATION Spouse  "

## 2025-07-21 NOTE — DISCHARGE INSTRUCTIONS
As discussed, your labs were concerning for infection of the fluid that was in your abdomen.  We treated you with IV antibiotics today, and you should return tomorrow afternoon for another treatment.  At that time we will have more information on your labs that are still pending, and can possibly prescribe an outpatient oral antibiotic at that time.  Return to the ER before then if you develop a fever of 100.4 Fahrenheit or higher or severe abdominal pain or uncontrollable nausea/vomiting.   Speaking Coherently

## (undated) DEVICE — SU PDS II 0 ENDOLOOP EZ10G

## (undated) DEVICE — SLEEVE COMPRESSION SCD KNEE MED 74022

## (undated) DEVICE — GLOVE BIOGEL INDICATOR 7.5 LF 41675

## (undated) DEVICE — ENDO APPLICATOR ARISTA FLEX TIP AM0005

## (undated) DEVICE — STPL RELOAD REG TISSUE ECHELON 60 X 3.6MM BLUE GST60B

## (undated) DEVICE — SU VICRYL 0 UR-6 27" J603H

## (undated) DEVICE — ENDO TROCAR SLEEVE KII Z-THREADED 05X100MM CTS02

## (undated) DEVICE — TUBING INSUFFLATOR W/FILTER OLYMPUS WA95005A

## (undated) DEVICE — PREP CHLORAPREP 26ML TINTED ORANGE  260815

## (undated) DEVICE — ADH SKIN CLOSURE PREMIERPRO EXOFIN 1.0ML 3470

## (undated) DEVICE — ENDO TROCAR FIRST ENTRY KII FIOS Z-THRD 12X100MM CTF73

## (undated) DEVICE — SU MONOCRYL 4-0 PS-2 27" UND Y426H

## (undated) DEVICE — COVER LIGHT HANDLE LT-F02

## (undated) DEVICE — DRAIN JACKSON PRATT RESERVOIR 100ML SU130-1305

## (undated) DEVICE — HEMOSTAT ABSORBABLE AGENT ARISTA 3GM POWDER SM0002-USA

## (undated) DEVICE — PACK LAPAROSCOPY LF SBA15LPFCA

## (undated) DEVICE — ENDO TROCAR SLEEVE KII 5X100MM CTR03

## (undated) DEVICE — GLOVE PROTEXIS POWDER FREE SMT 7.5  2D72PT75X

## (undated) DEVICE — SUCTION STRYKERFLOW II 250-070-500

## (undated) DEVICE — ENDO SCOPE WARMER DUAL LAP TM500D

## (undated) DEVICE — SYR 10ML LL W/O NDL 302995

## (undated) DEVICE — ESU CORD MONOPOLAR 10'  E0510

## (undated) DEVICE — SOL WATER 1500ML

## (undated) DEVICE — SU MONOCRYL 4-0 PC-3 18" UND Y845G

## (undated) DEVICE — ESU GROUND PAD ADULT W/CORD E7507

## (undated) DEVICE — BLADE CLIPPER 4406

## (undated) DEVICE — STPL POWERED ECHELON LONG 60MM PLEE60A

## (undated) DEVICE — DRSG DRAIN 4X4" 7086

## (undated) DEVICE — CLIP APPLIER ENDO ROTATING 10MM MED/LG ER320

## (undated) DEVICE — ESU LIGASURE LAPAROSCPC L-HOOK SEALER/DVDR 5MM-44CM LF5644

## (undated) DEVICE — SU SILK 2-0 SH 30" K833H

## (undated) DEVICE — VERRES NEEDLE 120MM DISPOSABLE 12/BX

## (undated) DEVICE — Device

## (undated) DEVICE — ENDO POUCH UNIV RETRIEVAL SYSTEM INZII 10MM CD001

## (undated) DEVICE — ESU HOLDER LAP INST DISP PURPLE LONG 330MM H-PRO-330

## (undated) RX ORDER — PROPOFOL 10 MG/ML
INJECTION, EMULSION INTRAVENOUS
Status: DISPENSED
Start: 2020-07-06

## (undated) RX ORDER — ACETAMINOPHEN 10 MG/ML
INJECTION, SOLUTION INTRAVENOUS
Status: DISPENSED
Start: 2020-07-06

## (undated) RX ORDER — BUPIVACAINE HYDROCHLORIDE AND EPINEPHRINE 5; 5 MG/ML; UG/ML
INJECTION, SOLUTION EPIDURAL; INTRACAUDAL; PERINEURAL
Status: DISPENSED
Start: 2020-07-06

## (undated) RX ORDER — LIDOCAINE HYDROCHLORIDE 10 MG/ML
INJECTION, SOLUTION EPIDURAL; INFILTRATION; INTRACAUDAL; PERINEURAL
Status: DISPENSED
Start: 2021-01-01

## (undated) RX ORDER — CYANOCOBALAMIN 1000 UG/ML
INJECTION, SOLUTION INTRAMUSCULAR; SUBCUTANEOUS
Status: DISPENSED
Start: 2019-08-22

## (undated) RX ORDER — LIDOCAINE HYDROCHLORIDE 10 MG/ML
INJECTION, SOLUTION INFILTRATION; PERINEURAL
Status: DISPENSED
Start: 2021-01-01

## (undated) RX ORDER — KETOROLAC TROMETHAMINE 30 MG/ML
INJECTION, SOLUTION INTRAMUSCULAR; INTRAVENOUS
Status: DISPENSED
Start: 2020-07-06

## (undated) RX ORDER — FENTANYL CITRATE 50 UG/ML
INJECTION, SOLUTION INTRAMUSCULAR; INTRAVENOUS
Status: DISPENSED
Start: 2020-07-11

## (undated) RX ORDER — SODIUM CHLORIDE, SODIUM LACTATE, POTASSIUM CHLORIDE, CALCIUM CHLORIDE 600; 310; 30; 20 MG/100ML; MG/100ML; MG/100ML; MG/100ML
INJECTION, SOLUTION INTRAVENOUS
Status: DISPENSED
Start: 2020-07-06

## (undated) RX ORDER — SODIUM CHLORIDE 9 MG/ML
INJECTION, SOLUTION INTRAVENOUS
Status: DISPENSED
Start: 2020-07-11

## (undated) RX ORDER — ONDANSETRON 2 MG/ML
INJECTION INTRAMUSCULAR; INTRAVENOUS
Status: DISPENSED
Start: 2020-07-11

## (undated) RX ORDER — PHENYLEPHRINE HCL IN 0.9% NACL 1 MG/10 ML
SYRINGE (ML) INTRAVENOUS
Status: DISPENSED
Start: 2020-07-06

## (undated) RX ORDER — SODIUM CHLORIDE 9 MG/ML
INJECTION, SOLUTION INTRAVENOUS
Status: DISPENSED
Start: 2021-01-01

## (undated) RX ORDER — CEFTRIAXONE SODIUM 2 G/50ML
INJECTION, SOLUTION INTRAVENOUS
Status: DISPENSED
Start: 2021-01-01

## (undated) RX ORDER — ONDANSETRON 2 MG/ML
INJECTION INTRAMUSCULAR; INTRAVENOUS
Status: DISPENSED
Start: 2021-01-01

## (undated) RX ORDER — CEFAZOLIN SODIUM 2 G/100ML
INJECTION, SOLUTION INTRAVENOUS
Status: DISPENSED
Start: 2020-07-06

## (undated) RX ORDER — TRIAMCINOLONE ACETONIDE 40 MG/ML
INJECTION, SUSPENSION INTRA-ARTICULAR; INTRAMUSCULAR
Status: DISPENSED
Start: 2018-07-31

## (undated) RX ORDER — FENTANYL CITRATE 50 UG/ML
INJECTION, SOLUTION INTRAMUSCULAR; INTRAVENOUS
Status: DISPENSED
Start: 2021-01-01

## (undated) RX ORDER — BUPIVACAINE HYDROCHLORIDE 5 MG/ML
INJECTION, SOLUTION EPIDURAL; INTRACAUDAL
Status: DISPENSED
Start: 2018-07-31

## (undated) RX ORDER — ONDANSETRON 4 MG/1
TABLET, ORALLY DISINTEGRATING ORAL
Status: DISPENSED
Start: 2020-07-06

## (undated) RX ORDER — LIDOCAINE HYDROCHLORIDE 20 MG/ML
INJECTION, SOLUTION EPIDURAL; INFILTRATION; INTRACAUDAL; PERINEURAL
Status: DISPENSED
Start: 2020-07-06

## (undated) RX ORDER — MAGNESIUM SULFATE HEPTAHYDRATE 40 MG/ML
INJECTION, SOLUTION INTRAVENOUS
Status: DISPENSED
Start: 2021-01-01

## (undated) RX ORDER — LIDOCAINE HYDROCHLORIDE 10 MG/ML
INJECTION, SOLUTION EPIDURAL; INFILTRATION; INTRACAUDAL; PERINEURAL
Status: DISPENSED
Start: 2018-07-31

## (undated) RX ORDER — DEXAMETHASONE SODIUM PHOSPHATE 4 MG/ML
INJECTION, SOLUTION INTRA-ARTICULAR; INTRALESIONAL; INTRAMUSCULAR; INTRAVENOUS; SOFT TISSUE
Status: DISPENSED
Start: 2020-07-06

## (undated) RX ORDER — DEXMEDETOMIDINE HYDROCHLORIDE 4 UG/ML
INJECTION, SOLUTION INTRAVENOUS
Status: DISPENSED
Start: 2020-07-06

## (undated) RX ORDER — ONDANSETRON 2 MG/ML
INJECTION INTRAMUSCULAR; INTRAVENOUS
Status: DISPENSED
Start: 2020-07-06

## (undated) RX ORDER — OXYCODONE AND ACETAMINOPHEN 5; 325 MG/1; MG/1
TABLET ORAL
Status: DISPENSED
Start: 2020-07-06